# Patient Record
Sex: FEMALE | Race: WHITE | Employment: OTHER | URBAN - METROPOLITAN AREA
[De-identification: names, ages, dates, MRNs, and addresses within clinical notes are randomized per-mention and may not be internally consistent; named-entity substitution may affect disease eponyms.]

---

## 2017-03-06 ENCOUNTER — TELEPHONE (OUTPATIENT)
Dept: FAMILY MEDICINE CLINIC | Age: 62
End: 2017-03-06

## 2017-03-06 DIAGNOSIS — Z00.00 ROUTINE GENERAL MEDICAL EXAMINATION AT A HEALTH CARE FACILITY: Primary | ICD-10-CM

## 2017-03-06 DIAGNOSIS — Z13.9 SCREENING: ICD-10-CM

## 2017-03-14 ENCOUNTER — HOSPITAL ENCOUNTER (OUTPATIENT)
Dept: MAMMOGRAPHY | Age: 62
Discharge: HOME | End: 2017-03-14
Attending: FAMILY MEDICINE | Admitting: FAMILY MEDICINE

## 2017-03-14 DIAGNOSIS — Z12.31 ENCOUNTER FOR SCREENING MAMMOGRAM FOR BREAST CANCER: ICD-10-CM

## 2017-03-21 DIAGNOSIS — E78.00 HYPERCHOLESTEROLEMIA: ICD-10-CM

## 2017-03-21 DIAGNOSIS — Z00.00 PREVENTATIVE HEALTH CARE: ICD-10-CM

## 2017-03-21 DIAGNOSIS — Z13.9 SCREENING: ICD-10-CM

## 2017-03-21 LAB
ALT SERPL-CCNC: 16 U/L (ref 10–40)
ANION GAP SERPL CALCULATED.3IONS-SCNC: 14 MMOL/L (ref 3–16)
AST SERPL-CCNC: 23 U/L (ref 15–37)
BUN BLDV-MCNC: 7 MG/DL (ref 7–20)
CALCIUM SERPL-MCNC: 9.8 MG/DL (ref 8.3–10.6)
CHLORIDE BLD-SCNC: 100 MMOL/L (ref 99–110)
CHOLESTEROL, TOTAL: 142 MG/DL (ref 0–199)
CO2: 29 MMOL/L (ref 21–32)
CREAT SERPL-MCNC: 0.5 MG/DL (ref 0.6–1.2)
GFR AFRICAN AMERICAN: >60
GFR NON-AFRICAN AMERICAN: >60
GLUCOSE BLD-MCNC: 95 MG/DL (ref 70–99)
HDLC SERPL-MCNC: 72 MG/DL (ref 40–60)
HEPATITIS C ANTIBODY INTERPRETATION: NORMAL
LDL CHOLESTEROL CALCULATED: 50 MG/DL
POTASSIUM SERPL-SCNC: 3.6 MMOL/L (ref 3.5–5.1)
SODIUM BLD-SCNC: 143 MMOL/L (ref 136–145)
TRIGL SERPL-MCNC: 102 MG/DL (ref 0–150)
VLDLC SERPL CALC-MCNC: 20 MG/DL

## 2017-03-22 LAB — HIV-1 AND HIV-2 ANTIBODIES: NORMAL

## 2017-03-24 ENCOUNTER — OFFICE VISIT (OUTPATIENT)
Dept: FAMILY MEDICINE CLINIC | Age: 62
End: 2017-03-24

## 2017-03-24 ENCOUNTER — HOSPITAL ENCOUNTER (OUTPATIENT)
Dept: NON INVASIVE DIAGNOSTICS | Age: 62
Discharge: HOME | End: 2017-03-24
Attending: FAMILY MEDICINE | Admitting: FAMILY MEDICINE

## 2017-03-24 VITALS
WEIGHT: 123 LBS | OXYGEN SATURATION: 99 % | BODY MASS INDEX: 22.63 KG/M2 | HEART RATE: 60 BPM | DIASTOLIC BLOOD PRESSURE: 82 MMHG | HEIGHT: 62 IN | SYSTOLIC BLOOD PRESSURE: 120 MMHG | TEMPERATURE: 98.2 F

## 2017-03-24 DIAGNOSIS — F41.9 ANXIETY: ICD-10-CM

## 2017-03-24 DIAGNOSIS — M54.2 NECK PAIN: ICD-10-CM

## 2017-03-24 DIAGNOSIS — K21.9 GASTROESOPHAGEAL REFLUX DISEASE, ESOPHAGITIS PRESENCE NOT SPECIFIED: ICD-10-CM

## 2017-03-24 DIAGNOSIS — R20.2 PARESTHESIAS IN LEFT HAND: ICD-10-CM

## 2017-03-24 DIAGNOSIS — Z00.00 PREVENTATIVE HEALTH CARE: Primary | ICD-10-CM

## 2017-03-24 DIAGNOSIS — E78.00 HYPERCHOLESTEROLEMIA: ICD-10-CM

## 2017-03-24 PROCEDURE — 99396 PREV VISIT EST AGE 40-64: CPT | Performed by: FAMILY MEDICINE

## 2017-03-24 RX ORDER — OMEPRAZOLE 20 MG/1
20 CAPSULE, DELAYED RELEASE ORAL DAILY
Qty: 90 CAPSULE | Refills: 3 | Status: SHIPPED | OUTPATIENT
Start: 2017-03-24 | End: 2018-04-05 | Stop reason: SDUPTHER

## 2017-03-24 RX ORDER — CITALOPRAM 40 MG/1
TABLET ORAL
Qty: 135 TABLET | Refills: 3 | Status: SHIPPED | OUTPATIENT
Start: 2017-03-24 | End: 2018-04-05 | Stop reason: SDUPTHER

## 2017-03-24 RX ORDER — ATORVASTATIN CALCIUM 40 MG/1
TABLET, FILM COATED ORAL
Qty: 90 TABLET | Refills: 3 | Status: SHIPPED | OUTPATIENT
Start: 2017-03-24 | End: 2018-04-05 | Stop reason: SDUPTHER

## 2017-03-24 ASSESSMENT — ENCOUNTER SYMPTOMS
CONSTIPATION: 0
ABDOMINAL PAIN: 0
SHORTNESS OF BREATH: 0
TROUBLE SWALLOWING: 0
VOMITING: 0
DIARRHEA: 0
EYE PAIN: 0
NAUSEA: 0

## 2017-03-28 ENCOUNTER — HOSPITAL ENCOUNTER (OUTPATIENT)
Dept: NEUROLOGY | Age: 62
Discharge: HOME | End: 2017-03-28
Attending: FAMILY MEDICINE | Admitting: FAMILY MEDICINE

## 2017-03-28 DIAGNOSIS — R20.2 PARESTHESIA OF SKIN: ICD-10-CM

## 2017-04-19 ENCOUNTER — TELEPHONE (OUTPATIENT)
Dept: FAMILY MEDICINE CLINIC | Age: 62
End: 2017-04-19

## 2017-07-10 ENCOUNTER — OFFICE VISIT (OUTPATIENT)
Dept: FAMILY MEDICINE CLINIC | Age: 62
End: 2017-07-10

## 2017-07-10 VITALS
HEIGHT: 62 IN | BODY MASS INDEX: 22.63 KG/M2 | WEIGHT: 123 LBS | SYSTOLIC BLOOD PRESSURE: 130 MMHG | DIASTOLIC BLOOD PRESSURE: 68 MMHG | TEMPERATURE: 98 F

## 2017-07-10 DIAGNOSIS — M25.511 ACUTE PAIN OF RIGHT SHOULDER: Primary | ICD-10-CM

## 2017-07-10 PROCEDURE — 99213 OFFICE O/P EST LOW 20 MIN: CPT | Performed by: FAMILY MEDICINE

## 2017-07-11 ENCOUNTER — OFFICE VISIT (OUTPATIENT)
Dept: ORTHOPEDIC SURGERY | Age: 62
End: 2017-07-11

## 2017-07-11 ENCOUNTER — TELEPHONE (OUTPATIENT)
Dept: ORTHOPEDIC SURGERY | Age: 62
End: 2017-07-11

## 2017-07-11 VITALS
WEIGHT: 123.02 LBS | RESPIRATION RATE: 16 BRPM | HEIGHT: 62 IN | BODY MASS INDEX: 22.64 KG/M2 | SYSTOLIC BLOOD PRESSURE: 118 MMHG | DIASTOLIC BLOOD PRESSURE: 60 MMHG

## 2017-07-11 DIAGNOSIS — M25.511 RIGHT SHOULDER PAIN, UNSPECIFIED CHRONICITY: Primary | ICD-10-CM

## 2017-07-11 PROCEDURE — 99243 OFF/OP CNSLTJ NEW/EST LOW 30: CPT | Performed by: ORTHOPAEDIC SURGERY

## 2017-07-21 ENCOUNTER — HOSPITAL ENCOUNTER (OUTPATIENT)
Dept: MRI IMAGING | Age: 62
Discharge: HOME | End: 2017-07-21
Attending: ORTHOPAEDIC SURGERY | Admitting: ORTHOPAEDIC SURGERY

## 2017-07-21 DIAGNOSIS — M25.511 RIGHT SHOULDER PAIN, UNSPECIFIED CHRONICITY: ICD-10-CM

## 2017-07-21 DIAGNOSIS — M25.511 PAIN IN RIGHT SHOULDER: ICD-10-CM

## 2017-07-25 ENCOUNTER — OFFICE VISIT (OUTPATIENT)
Dept: ORTHOPEDIC SURGERY | Age: 62
End: 2017-07-25

## 2017-07-25 VITALS
DIASTOLIC BLOOD PRESSURE: 80 MMHG | WEIGHT: 123 LBS | HEIGHT: 62 IN | SYSTOLIC BLOOD PRESSURE: 134 MMHG | RESPIRATION RATE: 16 BRPM | BODY MASS INDEX: 22.63 KG/M2

## 2017-07-25 DIAGNOSIS — M75.121 COMPLETE TEAR OF RIGHT ROTATOR CUFF: Primary | ICD-10-CM

## 2017-07-25 PROCEDURE — 99214 OFFICE O/P EST MOD 30 MIN: CPT | Performed by: ORTHOPAEDIC SURGERY

## 2017-07-27 ENCOUNTER — OFFICE VISIT (OUTPATIENT)
Dept: FAMILY MEDICINE CLINIC | Age: 62
End: 2017-07-27

## 2017-07-27 VITALS
WEIGHT: 124.2 LBS | DIASTOLIC BLOOD PRESSURE: 78 MMHG | HEIGHT: 62 IN | HEART RATE: 64 BPM | BODY MASS INDEX: 22.86 KG/M2 | TEMPERATURE: 98.4 F | SYSTOLIC BLOOD PRESSURE: 128 MMHG

## 2017-07-27 DIAGNOSIS — K21.9 GASTROESOPHAGEAL REFLUX DISEASE, ESOPHAGITIS PRESENCE NOT SPECIFIED: ICD-10-CM

## 2017-07-27 DIAGNOSIS — M75.121 COMPLETE TEAR OF RIGHT ROTATOR CUFF: ICD-10-CM

## 2017-07-27 DIAGNOSIS — Z01.818 PREOP EXAMINATION: Primary | ICD-10-CM

## 2017-07-27 DIAGNOSIS — E78.00 HYPERCHOLESTEROLEMIA: ICD-10-CM

## 2017-07-27 PROCEDURE — 99242 OFF/OP CONSLTJ NEW/EST SF 20: CPT | Performed by: FAMILY MEDICINE

## 2017-07-27 ASSESSMENT — ENCOUNTER SYMPTOMS
EYE DISCHARGE: 0
COUGH: 0
CONSTIPATION: 0
VOMITING: 0
RHINORRHEA: 0
DIARRHEA: 0
SHORTNESS OF BREATH: 0
SINUS PRESSURE: 0
WHEEZING: 0
BACK PAIN: 0
CHEST TIGHTNESS: 0
EYE PAIN: 0
BLOOD IN STOOL: 0
COLOR CHANGE: 0
EYE REDNESS: 0
ABDOMINAL PAIN: 0

## 2017-08-03 ENCOUNTER — SURG/PROC ORDERS (OUTPATIENT)
Dept: ANESTHESIOLOGY | Age: 62
End: 2017-08-03

## 2017-08-03 RX ORDER — SODIUM CHLORIDE 0.9 % (FLUSH) 0.9 %
10 SYRINGE (ML) INJECTION EVERY 12 HOURS SCHEDULED
Status: CANCELLED | OUTPATIENT
Start: 2017-08-03

## 2017-08-03 RX ORDER — SODIUM CHLORIDE 9 MG/ML
INJECTION, SOLUTION INTRAVENOUS CONTINUOUS
Status: CANCELLED | OUTPATIENT
Start: 2017-08-03

## 2017-08-03 RX ORDER — SODIUM CHLORIDE 0.9 % (FLUSH) 0.9 %
10 SYRINGE (ML) INJECTION PRN
Status: CANCELLED | OUTPATIENT
Start: 2017-08-03

## 2017-08-04 ENCOUNTER — HOSPITAL ENCOUNTER (OUTPATIENT)
Dept: SURGERY | Age: 62
Discharge: HOME | End: 2017-08-04
Attending: ORTHOPAEDIC SURGERY | Admitting: ORTHOPAEDIC SURGERY

## 2017-08-04 VITALS
TEMPERATURE: 97.1 F | RESPIRATION RATE: 18 BRPM | HEART RATE: 64 BPM | SYSTOLIC BLOOD PRESSURE: 143 MMHG | OXYGEN SATURATION: 96 % | DIASTOLIC BLOOD PRESSURE: 76 MMHG

## 2017-08-04 LAB
ANION GAP SERPL CALCULATED.3IONS-SCNC: 13 MMOL/L (ref 3–16)
BUN BLDV-MCNC: 5 MG/DL (ref 7–20)
CALCIUM SERPL-MCNC: 9.1 MG/DL (ref 8.3–10.6)
CHLORIDE BLD-SCNC: 101 MMOL/L (ref 99–110)
CO2: 25 MMOL/L (ref 21–32)
CREAT SERPL-MCNC: <0.5 MG/DL (ref 0.6–1.2)
GFR AFRICAN AMERICAN: >60
GFR NON-AFRICAN AMERICAN: >60
GLUCOSE BLD-MCNC: 101 MG/DL (ref 70–99)
HCT VFR BLD CALC: 41.2 % (ref 36–48)
HEMOGLOBIN: 13.9 G/DL (ref 12–16)
MCH RBC QN AUTO: 33.5 PG (ref 26–34)
MCHC RBC AUTO-ENTMCNC: 33.8 G/DL (ref 31–36)
MCV RBC AUTO: 99.1 FL (ref 80–100)
PDW BLD-RTO: 13.1 % (ref 12.4–15.4)
PLATELET # BLD: 239 K/UL (ref 135–450)
PMV BLD AUTO: 8.8 FL (ref 5–10.5)
POTASSIUM SERPL-SCNC: 3.4 MMOL/L (ref 3.5–5.1)
RBC # BLD: 4.16 M/UL (ref 4–5.2)
SODIUM BLD-SCNC: 139 MMOL/L (ref 136–145)
WBC # BLD: 7.1 K/UL (ref 4–11)

## 2017-08-04 PROCEDURE — 29827 SHO ARTHRS SRG RT8TR CUF RPR: CPT | Performed by: ORTHOPAEDIC SURGERY

## 2017-08-04 PROCEDURE — 93010 ELECTROCARDIOGRAM REPORT: CPT | Performed by: INTERNAL MEDICINE

## 2017-08-04 PROCEDURE — 29826 SHO ARTHRS SRG DECOMPRESSION: CPT | Performed by: ORTHOPAEDIC SURGERY

## 2017-08-04 RX ORDER — MORPHINE SULFATE 2 MG/ML
2 INJECTION, SOLUTION INTRAMUSCULAR; INTRAVENOUS EVERY 5 MIN PRN
Status: DISCONTINUED | OUTPATIENT
Start: 2017-08-04 | End: 2017-08-05 | Stop reason: HOSPADM

## 2017-08-04 RX ORDER — OXYCODONE HYDROCHLORIDE 5 MG/1
10 TABLET ORAL PRN
Status: ACTIVE | OUTPATIENT
Start: 2017-08-04 | End: 2017-08-04

## 2017-08-04 RX ORDER — OXYCODONE HYDROCHLORIDE AND ACETAMINOPHEN 5; 325 MG/1; MG/1
TABLET ORAL
Qty: 80 TABLET | Refills: 0 | Status: SHIPPED | OUTPATIENT
Start: 2017-08-04 | End: 2017-08-22 | Stop reason: SDUPTHER

## 2017-08-04 RX ORDER — OXYCODONE HYDROCHLORIDE AND ACETAMINOPHEN 5; 325 MG/1; MG/1
TABLET ORAL
Qty: 80 TABLET | Refills: 0 | Status: SHIPPED | OUTPATIENT
Start: 2017-08-04 | End: 2017-08-04

## 2017-08-04 RX ORDER — OXYCODONE HYDROCHLORIDE 5 MG/1
5 TABLET ORAL PRN
Status: ACTIVE | OUTPATIENT
Start: 2017-08-04 | End: 2017-08-04

## 2017-08-04 RX ORDER — FENTANYL CITRATE 50 UG/ML
50 INJECTION, SOLUTION INTRAMUSCULAR; INTRAVENOUS EVERY 5 MIN PRN
Status: DISCONTINUED | OUTPATIENT
Start: 2017-08-04 | End: 2017-08-05 | Stop reason: HOSPADM

## 2017-08-04 RX ORDER — SODIUM CHLORIDE 0.9 % (FLUSH) 0.9 %
10 SYRINGE (ML) INJECTION EVERY 12 HOURS SCHEDULED
Status: DISCONTINUED | OUTPATIENT
Start: 2017-08-04 | End: 2017-08-05 | Stop reason: HOSPADM

## 2017-08-04 RX ORDER — SODIUM CHLORIDE 9 MG/ML
INJECTION, SOLUTION INTRAVENOUS CONTINUOUS
Status: DISCONTINUED | OUTPATIENT
Start: 2017-08-04 | End: 2017-08-05 | Stop reason: HOSPADM

## 2017-08-04 RX ORDER — MEPERIDINE HYDROCHLORIDE 25 MG/ML
12.5 INJECTION INTRAMUSCULAR; INTRAVENOUS; SUBCUTANEOUS EVERY 5 MIN PRN
Status: DISCONTINUED | OUTPATIENT
Start: 2017-08-04 | End: 2017-08-05 | Stop reason: HOSPADM

## 2017-08-04 RX ORDER — FENTANYL CITRATE 50 UG/ML
25 INJECTION, SOLUTION INTRAMUSCULAR; INTRAVENOUS EVERY 5 MIN PRN
Status: DISCONTINUED | OUTPATIENT
Start: 2017-08-04 | End: 2017-08-05 | Stop reason: HOSPADM

## 2017-08-04 RX ORDER — MORPHINE SULFATE 2 MG/ML
1 INJECTION, SOLUTION INTRAMUSCULAR; INTRAVENOUS EVERY 5 MIN PRN
Status: DISCONTINUED | OUTPATIENT
Start: 2017-08-04 | End: 2017-08-05 | Stop reason: HOSPADM

## 2017-08-04 RX ORDER — ONDANSETRON 2 MG/ML
4 INJECTION INTRAMUSCULAR; INTRAVENOUS
Status: ACTIVE | OUTPATIENT
Start: 2017-08-04 | End: 2017-08-04

## 2017-08-04 RX ORDER — PROMETHAZINE HYDROCHLORIDE 25 MG/1
25 TABLET ORAL EVERY 6 HOURS PRN
Qty: 5 TABLET | Refills: 0 | Status: SHIPPED | OUTPATIENT
Start: 2017-08-04 | End: 2017-08-22

## 2017-08-04 RX ORDER — SODIUM CHLORIDE 0.9 % (FLUSH) 0.9 %
10 SYRINGE (ML) INJECTION PRN
Status: DISCONTINUED | OUTPATIENT
Start: 2017-08-04 | End: 2017-08-05 | Stop reason: HOSPADM

## 2017-08-04 RX ORDER — PROMETHAZINE HYDROCHLORIDE 25 MG/1
25 TABLET ORAL EVERY 6 HOURS PRN
Qty: 5 TABLET | Refills: 0 | Status: SHIPPED | OUTPATIENT
Start: 2017-08-04 | End: 2017-08-04

## 2017-08-04 RX ADMIN — SODIUM CHLORIDE: 9 INJECTION, SOLUTION INTRAVENOUS at 09:10

## 2017-08-04 ASSESSMENT — PAIN SCALES - GENERAL
PAINLEVEL_OUTOF10: 0

## 2017-08-04 ASSESSMENT — PAIN - FUNCTIONAL ASSESSMENT: PAIN_FUNCTIONAL_ASSESSMENT: 0-10

## 2017-08-07 LAB
EKG ATRIAL RATE: 53 BPM
EKG DIAGNOSIS: NORMAL
EKG P AXIS: 73 DEGREES
EKG P-R INTERVAL: 134 MS
EKG Q-T INTERVAL: 486 MS
EKG QRS DURATION: 86 MS
EKG QTC CALCULATION (BAZETT): 456 MS
EKG R AXIS: 67 DEGREES
EKG T AXIS: 63 DEGREES
EKG VENTRICULAR RATE: 53 BPM

## 2017-08-08 ENCOUNTER — OFFICE VISIT (OUTPATIENT)
Dept: ORTHOPEDIC SURGERY | Age: 62
End: 2017-08-08

## 2017-08-08 VITALS — BODY MASS INDEX: 22.63 KG/M2 | RESPIRATION RATE: 16 BRPM | HEIGHT: 62 IN | WEIGHT: 123 LBS

## 2017-08-08 DIAGNOSIS — Z98.890 S/P COMPLETE REPAIR OF ROTATOR CUFF: Primary | ICD-10-CM

## 2017-08-08 PROCEDURE — 99024 POSTOP FOLLOW-UP VISIT: CPT | Performed by: ORTHOPAEDIC SURGERY

## 2017-08-22 ENCOUNTER — OFFICE VISIT (OUTPATIENT)
Dept: ORTHOPEDIC SURGERY | Age: 62
End: 2017-08-22

## 2017-08-22 VITALS — WEIGHT: 123 LBS | HEIGHT: 62 IN | RESPIRATION RATE: 16 BRPM | BODY MASS INDEX: 22.63 KG/M2

## 2017-08-22 DIAGNOSIS — M75.121 COMPLETE TEAR OF RIGHT ROTATOR CUFF: Primary | ICD-10-CM

## 2017-08-22 PROCEDURE — 99024 POSTOP FOLLOW-UP VISIT: CPT | Performed by: ORTHOPAEDIC SURGERY

## 2017-08-22 RX ORDER — OXYCODONE HYDROCHLORIDE AND ACETAMINOPHEN 5; 325 MG/1; MG/1
TABLET ORAL
Qty: 80 TABLET | Refills: 0 | Status: SHIPPED | OUTPATIENT
Start: 2017-08-22 | End: 2017-09-19 | Stop reason: SDUPTHER

## 2017-09-19 ENCOUNTER — OFFICE VISIT (OUTPATIENT)
Dept: ORTHOPEDIC SURGERY | Age: 62
End: 2017-09-19

## 2017-09-19 VITALS — RESPIRATION RATE: 16 BRPM | BODY MASS INDEX: 22.64 KG/M2 | HEIGHT: 62 IN | WEIGHT: 123.02 LBS

## 2017-09-19 DIAGNOSIS — M75.121 COMPLETE TEAR OF RIGHT ROTATOR CUFF: Primary | ICD-10-CM

## 2017-09-19 PROCEDURE — 99024 POSTOP FOLLOW-UP VISIT: CPT | Performed by: ORTHOPAEDIC SURGERY

## 2017-09-19 RX ORDER — OXYCODONE HYDROCHLORIDE AND ACETAMINOPHEN 5; 325 MG/1; MG/1
TABLET ORAL
Qty: 60 TABLET | Refills: 0 | Status: SHIPPED | OUTPATIENT
Start: 2017-09-19 | End: 2017-11-14

## 2017-09-23 ENCOUNTER — HOSPITAL ENCOUNTER (OUTPATIENT)
Dept: PHYSICAL THERAPY | Age: 62
Discharge: HOME | End: 2017-09-30
Admitting: ORTHOPAEDIC SURGERY

## 2017-09-23 NOTE — PLAN OF CARE
[facility], [address]  Phone: [phone]  Fax [phone]     Physical Therapy Certification    Dear Referring Practitioner: Dr. Yola Galdamez,    We had the pleasure of evaluating the following patient for physical therapy services at [address] [place] [address]. A summary of our findings can be found in the initial assessment below. This includes our plan of care. If you have any questions or concerns regarding these findings, please do not hesitate to contact me at the office phone number checked above. Thank you for the referral.       Physician Signature:_______________________________Date:__________________  By signing above (or electronic signature), therapists plan is approved by physician    Patient: Malina Araiza   : 1955   MRN: [id number]  Referring Physician: Referring Practitioner: Dr. Yola Galdamez      Evaluation Date: 2017      Medical Diagnosis Information:  Diagnosis: R Rotator Cuff Tear - M75.121   Treatment Diagnosis: R Rotator cuff tear - M75.121 / R shoulder stiffness M25.611/ R shoulder pain M25.511                                         Insurance information: PT Insurance Information: Cornelio x - 20 visits     Precautions/ Contra-indications: Pt reports L shoulder pain as well. Latex Allergy:  [x]NO      []YES  Preferred Language for Healthcare:   [x]English       []other:    SUBJECTIVE: Patient stated complaint: Pt had discomfort in her R shoulder for months. Then on vacation in July she reached out to catch herself on a float and had a bad pain. She went to the MD on the way home because she could not lift her arm. She had surgery on 17 for SAD, RCR.     Relevant Medical History:Anxiety / Depression   Functional Disability Index:PT G-Codes  Functional Assessment Tool Used: QuickDASH  Score: 60%  Functional Limitation: Carrying, moving and handling objects  Carrying, Moving and Handling Objects Current Status (): At least 60 percent but less than 80 percent impaired, limited or restricted  Carrying, Moving and Handling Objects Goal Status (): At least 20 percent but less than 40 percent impaired, limited or restricted    Pain Scale: 2/10  Easing factors: ice machine   Provocative factors: getting dressed, doing hair, She has not used her arm in any sort of lifting motion     Type: []Constant   [x]Intermittent  []Radiating []Localized []other:     Numbness/Tingling: R thumb    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 She has gone back to work, but has been picking light jobs. Living Status/Prior Level of Function: Independent with ADLs and IADLs,    OBJECTIVE:     CERV ROM Limited     Cervical Flexion     Cervical Extension     Cervical SB     Cervical rotation          ROM Left Right   Shoulder Flex 162 38   Shoulder Abd 164 35   Shoulder ER T2 chin   Shoulder IR T6 R glut fold   Elbow flex     Elbow ext           Strength  Left Right   Shoulder Flex 4+/5 NA due to surgery   Shoulder Scap 4-/5    Shoulder ER 4+/5    Shoulder IR 5/5                Reflexes/Sensation:    [x]Dermatomes/Myotomes intact    [x]Reflexes equal and normal bilaterally   []Other:    Joint mobility:    []Normal    [x]Hypo   []Hyper    Palpation: tenderness to palpation of cervical muscles and around the shoulder    Functional Mobility/Transfers: independent        Posture: rounded shoulders/ forward head     Bandages/Dressings/Incisions: healed    Gait: : WNL    Orthopedic Special Tests: NA due to post surgerical status                        [x] Patient history, allergies, meds reviewed. Medical chart reviewed. See intake form. Review Of Systems (ROS):  [x]Performed Review of systems (Integumentary, CardioPulmonary, Neurological) by intake and observation. Intake form has been scanned into medical record.  Patient has been instructed to contact their primary care physician regarding ROS issues if not already being addressed at this time. Co-morbidities/Complexities (which will affect course of rehabilitation):   []None           Arthritic conditions   []Rheumatoid arthritis (M05.9)  []Osteoarthritis (M19.91)   Cardiovascular conditions   []Hypertension (I10)  []Hyperlipidemia (E78.5)  []Angina pectoris (I20)  []Atherosclerosis (I70)   Musculoskeletal conditions   []Disc pathology   []Congenital spine pathologies   []Prior surgical intervention  []Osteoporosis (M81.8)  []Osteopenia (M85.8)   Endocrine conditions   []Hypothyroid (E03.9)  []Hyperthyroid Gastrointestinal conditions   []Constipation (I14.78)   Metabolic conditions   []Morbid obesity (E66.01)  []Diabetes type 1(E10.65) or 2 (E11.65)   []Neuropathy (G60.9)     Pulmonary conditions   []Asthma (J45)  []Coughing   []COPD (J44.9)   Psychological Disorders  [x]Anxiety (F41.9)  [x]Depression (F32.9)   []Other:   []Other:          Barriers to/and or personal factors that will affect rehab potential:              [x]Age  []Sex              []Motivation/Lack of Motivation                        [x]Co-Morbidities              []Cognitive Function, education/learning barriers              []Environmental, home barriers              []profession/work barriers  []past PT/medical experience  []other:  Justification:  Pt does have some weakness in abduction on the L shoulder. She also is returning to a physical job     Falls Risk Assessment (30 days):   [x] Falls Risk assessed and no intervention required. [] Falls Risk assessed and Patient requires intervention due to being higher risk   TUG score (>12s at risk):     [] Falls education provided, including       G-Codes:  PT G-Codes  Functional Assessment Tool Used: QuickDASH  Score: 60%  Functional Limitation: Carrying, moving and handling objects  Carrying, Moving and Handling Objects Current Status ():  At least 60 percent but less than 80 percent impaired, limited or dysfunction    []Signs/symptoms consistent with Glenohumeral IR Deficit - <45 degrees   []Signs/symptoms consistent with facet dysfunction of cervical/thoracic spine    []Signs/symptoms consistent with pathology which may benefit from Dry needling     []other:     Prognosis/Rehab Potential:      []Excellent   [x]Good    []Fair   []Poor    Tolerance of evaluation/treatment:    []Excellent   [x]Good    []Fair   []Poor  Physical Therapy Evaluation Complexity Justification  [x] A history of present problem with:  [] no personal factors and/or comorbidities that impact the plan of care;  [x]1-2 personal factors and/or comorbidities that impact the plan of care  []3 personal factors and/or comorbidities that impact the plan of care  [x] An examination of body systems using standardized tests and measures addressing any of the following: body structures and functions (impairments), activity limitations, and/or participation restrictions;:  [x] a total of 1-2 or more elements   [] a total of 3 or more elements   [] a total of 4 or more elements   [x] A clinical presentation with:  [] stable and/or uncomplicated characteristics   [x] evolving clinical presentation with changing characteristics  [] unstable and unpredictable characteristics;   [x] Clinical decision making of [x] low, [] moderate, [] high complexity using standardized patient assessment instrument and/or measurable assessment of functional outcome.     [x] EVAL (LOW) 91857 (typically 20 minutes face-to-face)  [] EVAL (MOD) 83208 (typically 30 minutes face-to-face)  [] EVAL (HIGH) 58570 (typically 45 minutes face-to-face)  [] RE-EVAL       PLAN:  Frequency/Duration:  2 days per week for 8 Weeks:  INTERVENTIONS:  [x] Therapeutic exercise including: strength training, ROM, for Upper extremity and core   [x]  NMR activation and proprioception for UE, scap and Core   [x] Manual therapy as indicated for shoulder, scapula and spine to include: Dry Needling/IASTM, STM, PROM, Gr I-IV mobilizations, manipulation. [x] Modalities as needed that may include: thermal agents, E-stim, Biofeedback, US, iontophoresis as indicated  [x] Patient education on joint protection, postural re-education, activity modification, progression of HEP. HEP instruction:(see scanned forms)    GOALS:  Patient stated goal: To be able to brush her hair and put on make up    Therapist goals for Patient:   Short Term Goals: To be achieved in: 2 weeks  1. Independent in HEP and progression per patient tolerance, in order to prevent re-injury. 2. Patient will have a decrease in pain to facilitate improvement in movement, function, and ADLs as indicated by Functional Deficits. Long Term Goals: To be achieved in: 8 weeks  1. Disability index score of 30% or less for the [facility] to assist with reaching prior level of function. 2. Patient will demonstrate increased AROM to shoulder flex and abd 130, IR: T12, ER: T1 to allow for proper joint functioning as indicated by patients Functional Deficits. 3. Patient will demonstrate an increase in Strength to 4/5 in the right arm to allow for proper functional mobility as indicated by patients Functional Deficits. 4. Patient will return to self care activities without increased symptoms or restriction.    5. 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

## 2017-09-23 NOTE — FLOWSHEET NOTE
Manual Intervention                                        NMR re-education     Pt was educated on weaning from the sling                                             Therapeutic Exercise and NMR EXR  [] (26227) Provided verbal/tactile cueing for activities related to strengthening, flexibility, endurance, ROM  for improvements in scapular, scapulothoracic and UE control with self care, reaching, carrying, lifting, house/yardwork, driving/computer work.    [] (56453) Provided verbal/tactile cueing for activities related to improving balance, coordination, kinesthetic sense, posture, motor skill, proprioception  to assist with  scapular, scapulothoracic and UE control with self care, reaching, carrying, lifting, house/yardwork, driving/computer work. Therapeutic Activities:    [] (31987 or 43268) Provided verbal/tactile cueing for activities related to improving balance, coordination, kinesthetic sense, posture, motor skill, proprioception and motor activation to allow for proper function of scapular, scapulothoracic and UE control with self care, carrying, lifting, driving/computer work.      Home Exercise Program:    [x] (43848) Reviewed/Progressed HEP activities related to strengthening, flexibility, endurance, ROM of scapular, scapulothoracic and UE control with self care, reaching, carrying, lifting, house/yardwork, driving/computer work  [] (67053) Reviewed/Progressed HEP activities related to improving balance, coordination, kinesthetic sense, posture, motor skill, proprioception of scapular, scapulothoracic and UE control with self care, reaching, carrying, lifting, house/yardwork, driving/computer work      Manual Treatments:  PROM / STM / Oscillations-Mobs:  G-I, II, III, IV (PA's, Inf., Post.)  [] (04969) Provided manual therapy to mobilize soft tissue/joints of cervical/CT, scapular GHJ and UE for the purpose of modulating pain, promoting relaxation,  increasing ROM, reducing/eliminating soft tissue swelling/inflammation/restriction, improving soft tissue extensibility and allowing for proper ROM for normal function with self care, reaching, carrying, lifting, house/yardwork, driving/computer work    Modalities:  premod + cp - 15 min     Charges:  Timed Code Treatment Minutes: 35   Total Treatment Minutes: 65     [x] EVAL (LOW) 96917 (typically 20 minutes face-to-face)  [] EVAL (MOD) 95608 (typically 30 minutes face-to-face)  [] EVAL (HIGH) 28683 (typically 45 minutes face-to-face)  [] RE-EVAL     [x] UP(96683) x  1   [] IONTO  [] NMR (97133) x      [] VASO  [x] Manual (28787) x  1    [] Other:  [] TA x       [] Mech Traction (85573)  [] ES(attended) (73371)      [x] ES (un) (61384):     GOALS  Patient stated goal: To be able to brush her hair and put on make up     Therapist goals for Patient:   Short Term Goals: To be achieved in: 2 weeks  1. Independent in HEP and progression per patient tolerance, in order to prevent re-injury. 2. Patient will have a decrease in pain to facilitate improvement in movement, function, and ADLs as indicated by Functional Deficits.     Long Term Goals: To be achieved in: 8 weeks  1. Disability index score of 30% or less for the [facility] to assist with reaching prior level of function. 2. Patient will demonstrate increased AROM to shoulder flex and abd 130, IR: T12, ER: T1 to allow for proper joint functioning as indicated by patients Functional Deficits. 3. Patient will demonstrate an increase in Strength to 4/5 in the right arm to allow for proper functional mobility as indicated by patients Functional Deficits. 4. Patient will return to self care activities without increased symptoms or restriction. 5. 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 [] Progression is slowed due to complexities listed.   [] Progression has been slowed due to co-morbidities.   [x] Plan just implemented, too soon to assess goals progression  [] Other:     ASSESSMENT:  See eval    Treatment/Activity Tolerance:  [x] Patient tolerated treatment well [] Patient limited by fatique  [] Patient limited by pain  [] Patient limited by other medical complications  [] Other:     Prognosis: [x] Good [] Fair  [] Poor    Patient Requires Follow-up: [x] Yes  [] No    PLAN: See eval  [] Continue per plan of care [] Alter current plan (see comments)  [x] Plan of care initiated [] Hold pending MD visit [] Discharge    Electronically signed by: Aba Asher PT, DPT #[id number]

## 2017-09-26 ENCOUNTER — HOSPITAL ENCOUNTER (OUTPATIENT)
Dept: PHYSICAL THERAPY | Age: 62
Discharge: HOME | End: 2017-09-26
Admitting: ORTHOPAEDIC SURGERY

## 2017-09-28 ENCOUNTER — HOSPITAL ENCOUNTER (OUTPATIENT)
Dept: PHYSICAL THERAPY | Age: 62
Discharge: HOME | End: 2017-09-28
Admitting: ORTHOPAEDIC SURGERY

## 2017-10-03 ENCOUNTER — OFFICE VISIT (OUTPATIENT)
Dept: ORTHOPEDIC SURGERY | Age: 62
End: 2017-10-03

## 2017-10-03 ENCOUNTER — HOSPITAL ENCOUNTER (OUTPATIENT)
Dept: PHYSICAL THERAPY | Age: 62
Discharge: HOME | End: 2017-10-03
Admitting: ORTHOPAEDIC SURGERY

## 2017-10-03 VITALS
HEART RATE: 58 BPM | DIASTOLIC BLOOD PRESSURE: 65 MMHG | BODY MASS INDEX: 22.26 KG/M2 | WEIGHT: 121 LBS | HEIGHT: 62 IN | SYSTOLIC BLOOD PRESSURE: 118 MMHG

## 2017-10-03 DIAGNOSIS — M75.82 ROTATOR CUFF TENDONITIS, LEFT: Primary | ICD-10-CM

## 2017-10-03 DIAGNOSIS — M25.512 LEFT SHOULDER PAIN, UNSPECIFIED CHRONICITY: ICD-10-CM

## 2017-10-03 PROCEDURE — 99214 OFFICE O/P EST MOD 30 MIN: CPT | Performed by: ORTHOPAEDIC SURGERY

## 2017-10-03 PROCEDURE — 20610 DRAIN/INJ JOINT/BURSA W/O US: CPT | Performed by: ORTHOPAEDIC SURGERY

## 2017-10-03 NOTE — MR AVS SNAPSHOT
Medications and Orders      Your Current Medications Are              oxyCODONE-acetaminophen (PERCOCET) 5-325 MG per tablet Take 1 tablet every 8-12 hours as needed for pain. Calcium Carbonate-Vitamin D (CALTRATE 600+D PO) Take 1 tablet by mouth 2 times daily    atorvastatin (LIPITOR) 40 MG tablet TAKE ONE TABLET BY MOUTH DAILY    citalopram (CELEXA) 40 MG tablet TAKE ONE AND ONE-HALF (1  1/2) TABLETS BY MOUTH DAILY    omeprazole (PRILOSEC) 20 MG delayed release capsule Take 1 capsule by mouth daily    Biotin 1 MG CAPS Take  by mouth daily. fish oil-omega-3 fatty acids 1000 MG capsule Take 1 g by mouth 2 times daily     Multiple Vitamins-Minerals (CENTRUM SILVER PO) Take  by mouth daily. B Complex Vitamins (B COMPLEX 100 PO) Take  by mouth daily. aspirin 81 MG EC tablet Take 81 mg by mouth daily.         Allergies           No Known Allergies      We Ordered/Performed the following           VT ARTHROCENTESIS ASPIR&/INJ MAJOR JT/BURSA W/O US     VT TRIAMCINOLONE ACETONIDE INJ     Comments:    Kenalog (40mg/mL) 1 cc injected    XR SHOULDER LEFT (MIN 2 VIEWS)     Comments:    RM 26. 3V Lt Shoulder         Result Summary for XR SHOULDER LEFT (MIN 2 VIEWS)      Result Information     Status          Final result (Exam End: 10/3/2017  2:26 PM)           10/3/2017  2:26 PM      Narrative & Impression           Radiology result is complete; follow up with provider / physician office for radiology results                       Additional Information        Basic Information     Date Of Birth Sex Race Ethnicity Preferred Language    1955 Female White Non-/Non  English      Problem List as of 10/3/2017  Date Reviewed: 10/3/2017                Complete tear of right rotator cuff    Hypercholesterolemia    Dysphagia    Ulcer, esophagus    Depression    Anxiety    GERD (gastroesophageal reflux disease)      Immunizations as of 10/3/2017     Name Date

## 2017-10-03 NOTE — PROGRESS NOTES
CHIEF COMPLAINT: Left shoulder pain    History:    Sana Hernández is a 60 y.o. White female self-referred for evaluation and treatment of Left shoulder pain. She is 2 months s/p right shoulder rotator cuff repair. This is evaluated as a personal injury. The pain is described as aching. She states it feels like her right shoulder did prior to surgery. The pain began 3 months ago. There was not an injury. Pain is rated as a 5/10 . Pain is located lateral.  The symptoms are worse with reaching, lifting, work at or above shoulder height. Symptoms improve with ice. Limited activities include no limitations. No stiffness, no weakness reported. The patient does report night pain. The patient has had PT. She is dong the same exercises on her left shoulder as she is for her right shoulder rotator cuff rehab. The patient has not had an injection. The patient has not tried NSAIDs. Outside reports reviewed:  none. Past Medical History:   Diagnosis Date    Acid reflux     Anxiety     Depression     Hyperlipidemia     controlled with meds    Ulcer, esophagus        Current Outpatient Prescriptions on File Prior to Visit   Medication Sig Dispense Refill    oxyCODONE-acetaminophen (PERCOCET) 5-325 MG per tablet Take 1 tablet every 8-12 hours as needed for pain. 60 tablet 0    Calcium Carbonate-Vitamin D (CALTRATE 600+D PO) Take 1 tablet by mouth 2 times daily      atorvastatin (LIPITOR) 40 MG tablet TAKE ONE TABLET BY MOUTH DAILY (Patient taking differently: Take 40 mg by mouth nightly TAKE ONE TABLET BY MOUTH DAILY) 90 tablet 3    citalopram (CELEXA) 40 MG tablet TAKE ONE AND ONE-HALF (1  1/2) TABLETS BY MOUTH DAILY 135 tablet 3    omeprazole (PRILOSEC) 20 MG delayed release capsule Take 1 capsule by mouth daily 90 capsule 3    Biotin 1 MG CAPS Take  by mouth daily.       fish oil-omega-3 fatty acids 1000 MG capsule Take 1 g by mouth 2 times daily       Multiple Vitamins-Minerals (CENTRUM SILVER PO) Take

## 2017-10-03 NOTE — FLOWSHEET NOTE
educated for HEP  HEP only   Table slides ( counter walk back)  10 x 10\" sec  HEP    scap retract  10x10\" HEP   Cane ER supine/cane flexion supine 10x10\"    Supine bicep curl 1# 20x with scap retraction    Finisher flexion alt R/L foot fwd 2# 10x each    Finisher small\" wax on\" BUEs 2# 10x    SL ER With PT assist 10x    Seated no $ 10x5\" +HEP 10/3/17                            pulleys 15x 5\" flexion              Manual Intervention     GII oscillations and post/inf GH mobs, PROM ER and flexion 12'                                  NMR re-education     Seated submax isos 25% FLX/ER/IR/EXT 5x5\" each                                           Therapeutic Exercise and NMR EXR  [] (04294) Provided verbal/tactile cueing for activities related to strengthening, flexibility, endurance, ROM  for improvements in scapular, scapulothoracic and UE control with self care, reaching, carrying, lifting, house/yardwork, driving/computer work.    [] (18208) Provided verbal/tactile cueing for activities related to improving balance, coordination, kinesthetic sense, posture, motor skill, proprioception  to assist with  scapular, scapulothoracic and UE control with self care, reaching, carrying, lifting, house/yardwork, driving/computer work. Therapeutic Activities:    [] (29351 or 07524) Provided verbal/tactile cueing for activities related to improving balance, coordination, kinesthetic sense, posture, motor skill, proprioception and motor activation to allow for proper function of scapular, scapulothoracic and UE control with self care, carrying, lifting, driving/computer work.      Home Exercise Program:    [x] (88310) Reviewed/Progressed HEP activities related to strengthening, flexibility, endurance, ROM of scapular, scapulothoracic and UE control with self care, reaching, carrying, lifting, house/yardwork, driving/computer work  [] (71143) Reviewed/Progressed HEP activities related to improving balance, coordination, kinesthetic sense, posture, motor skill, proprioception of scapular, scapulothoracic and UE control with self care, reaching, carrying, lifting, house/yardwork, driving/computer work      Manual Treatments:  PROM / STM / Oscillations-Mobs:  G-I, II, III, IV (PA's, Inf., Post.)  [] (91959) Provided manual therapy to mobilize soft tissue/joints of cervical/CT, scapular GHJ and UE for the purpose of modulating pain, promoting relaxation,  increasing ROM, reducing/eliminating soft tissue swelling/inflammation/restriction, improving soft tissue extensibility and allowing for proper ROM for normal function with self care, reaching, carrying, lifting, house/yardwork, driving/computer work    Modalities:    PM/CP x 15' R shoulder    Charges:  Timed Code Treatment Minutes: 41   Total Treatment Minutes: 56     [] EVAL (LOW) 26517 (typically 20 minutes face-to-face)  [] EVAL (MOD) 75246 (typically 30 minutes face-to-face)  [] EVAL (HIGH) 00102 (typically 45 minutes face-to-face)  [] RE-EVAL     [x] WE(85391) x  2   [] IONTO  [] NMR (91159) x      [] VASO  [x] Manual (73014) x  1    [] Other:  [] TA x       [] Mech Traction (48323)  [] ES(attended) (60212)      [x] ES (un) (50573):     GOALS  Patient stated goal: To be able to brush her hair and put on make up     Therapist goals for Patient:   Short Term Goals: To be achieved in: 2 weeks  1. Independent in HEP and progression per patient tolerance, in order to prevent re-injury. 2. Patient will have a decrease in pain to facilitate improvement in movement, function, and ADLs as indicated by Functional Deficits.     Long Term Goals: To be achieved in: 8 weeks  1. Disability index score of 30% or less for the [facility] to assist with reaching prior level of function. 2. Patient will demonstrate increased AROM to shoulder flex and abd 130, IR: T12, ER: T1 to allow for proper joint functioning as indicated by patients Functional Deficits.    3. Patient will demonstrate an increase in Strength to 4/5 in the right arm to allow for proper functional mobility as indicated by patients Functional Deficits. 4. Patient will return to self care activities without increased symptoms or restriction. 5. 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 [] Progression is slowed due to complexities listed. [] Progression has been slowed due to co-morbidities. [x] Plan just implemented, too soon to assess goals progression  [] Other:     ASSESSMENT: Better tolerance to PROM both flexion and ER this visit. Becoming more comfortable with moving UE. Treatment/Activity Tolerance:  [x] Patient tolerated treatment well [] Patient limited by fatique  [] Patient limited by pain  [] Patient limited by other medical complications  [] Other:     Prognosis: [x] Good [] Fair  [] Poor    Patient Requires Follow-up: [x] Yes  [] No    PLAN: See eval for full POC; ROM measurements NV.   [x] Continue per plan of care [] Alter current plan (see comments)  [] Plan of care initiated [] Hold pending MD visit [] Discharge    Electronically signed by: Natalia Trevizo PT [id number]

## 2017-10-05 ENCOUNTER — HOSPITAL ENCOUNTER (OUTPATIENT)
Dept: PHYSICAL THERAPY | Age: 62
Discharge: HOME | End: 2017-10-05
Admitting: ORTHOPAEDIC SURGERY

## 2017-10-05 NOTE — FLOWSHEET NOTE
[facility],  [address]  Phone: [phone]  Fax [phone]      Physical Therapy Daily Treatment Note  Date:  10/5/2017    Patient Name:  Wilfrid Desai    :  1955  MRN: [id number]  Restrictions/Precautions:  Surgery on 17 RCR massive tear  Medical/Treatment Diagnosis Information:  · Diagnosis: R Rotator Cuff Tear - M75.121  · Treatment Diagnosis: R Rotator cuff tear - M75.121 / R shoulder stiffness M25.611/ R shoulder pain J77.486  Insurance/Certification information:  PT Insurance Information: [employer] - 20 visits   Physician Information:  Referring Practitioner: Dr. Michela Lima of care signed (Y/N): NO    Date of Patient follow up with Physician: 17       G-Code (if applicable):                                                                                     Date G-Code Applied:  17  PT G-Codes  Functional Assessment Tool Used: QuickDASH  Score: 60%  Functional Limitation: Carrying, moving and handling objects  Carrying, Moving and Handling Objects Current Status (): At least 60 percent but less than 80 percent impaired, limited or restricted  Carrying, Moving and Handling Objects Goal Status (): At least 20 percent but less than 40 percent impaired, limited or restricted    Progress Note: [x]  Yes  []  No  Next due by: Visit #10      Latex Allergy:  [x]NO      []YES  Preferred Language for Healthcare:   [x]English       []other:    Visit # Insurance Allowable   5 20     Pain level:  1-2/10     SUBJECTIVE: \"Has been really good not overly sore after last visit. Doctor said I'm doing well. \"    OBJECTIVE:   Observation: tight inf/post capsule  Test measurements: PROM ER @60 ABD:60 supine cane     RESTRICTIONS/PRECAUTIONS: L shoulder weakness in abduction/ limited cervical ROM     Exercises/Interventions:   Therapeutic Ex Sets/rep comments   HEP only   HEP only   Pt educated improving balance, coordination, kinesthetic sense, posture, motor skill, proprioception of scapular, scapulothoracic and UE control with self care, reaching, carrying, lifting, house/yardwork, driving/computer work      Manual Treatments:  PROM / STM / Oscillations-Mobs:  G-I, II, III, IV (PA's, Inf., Post.)  [x] (38031) Provided manual therapy to mobilize soft tissue/joints of cervical/CT, scapular GHJ and UE for the purpose of modulating pain, promoting relaxation,  increasing ROM, reducing/eliminating soft tissue swelling/inflammation/restriction, improving soft tissue extensibility and allowing for proper ROM for normal function with self care, reaching, carrying, lifting, house/yardwork, driving/computer work    Modalities:    PM/CP x 15' R shoulder    Charges:  Timed Code Treatment Minutes: 43   Total Treatment Minutes: 59     [] EVAL (LOW) 59737 (typically 20 minutes face-to-face)  [] EVAL (MOD) 56442 (typically 30 minutes face-to-face)  [] EVAL (HIGH) 06430 (typically 45 minutes face-to-face)  [] RE-EVAL     [x] IS(73527) x  2   [] IONTO  [] NMR (11195) x      [] VASO  [x] Manual (76940) x  1    [] Other:  [] TA x       [] Mech Traction (02875)  [] ES(attended) (41897)      [x] ES (un) (13923):     GOALS  Patient stated goal: To be able to brush her hair and put on make up     Therapist goals for Patient:   Short Term Goals: To be achieved in: 2 weeks  1. Independent in HEP and progression per patient tolerance, in order to prevent re-injury. 2. Patient will have a decrease in pain to facilitate improvement in movement, function, and ADLs as indicated by Functional Deficits.     Long Term Goals: To be achieved in: 8 weeks  1. Disability index score of 30% or less for the [facility] to assist with reaching prior level of function. 2. Patient will demonstrate increased AROM to shoulder flex and abd 130, IR: T12, ER: T1 to allow for proper joint functioning as indicated by patients Functional Deficits.    3.

## 2017-10-12 ENCOUNTER — HOSPITAL ENCOUNTER (OUTPATIENT)
Dept: PHYSICAL THERAPY | Age: 62
Discharge: HOME | End: 2017-10-12
Admitting: ORTHOPAEDIC SURGERY

## 2017-10-12 NOTE — FLOWSHEET NOTE
[facility], [address]  Phone: [phone]  Fax [phone]      Physical Therapy Daily Treatment Note  Date:  10/12/2017    Patient Name:  Shy Clayton    :  1955  MRN: [id number]  Restrictions/Precautions:  Surgery on 17 RCR massive tear  Medical/Treatment Diagnosis Information:  · Diagnosis: R Rotator Cuff Tear - M75.121  · Treatment Diagnosis: R Rotator cuff tear - M75.121 / R shoulder stiffness M25.611/ R shoulder pain B58.362  Insurance/Certification information:  PT Insurance Information: [place] [facility] - 20 visits   Physician Information:  Referring Practitioner: Dr. Katie Arais of care signed (Y/N): NO    Date of Patient follow up with Physician: 17       G-Code (if applicable):                                                                                     Date G-Code Applied:  17  PT G-Codes  Functional Assessment Tool Used: QuickDASH  Score: 60%  Functional Limitation: Carrying, moving and handling objects  Carrying, Moving and Handling Objects Current Status (): At least 60 percent but less than 80 percent impaired, limited or restricted  Carrying, Moving and Handling Objects Goal Status (): At least 20 percent but less than 40 percent impaired, limited or restricted    Progress Note: [x]  Yes  []  No  Next due by: Visit #10      Latex Allergy:  [x]NO      []YES  Preferred Language for Healthcare:   [x]English       []other:    Visit # Insurance Allowable   6 20     Pain level:  1-2/10     SUBJECTIVE: Patient reports her shoulder does not hurt much. Gets tired with exercises.      OBJECTIVE:   Observation: tight inf/post capsule  Test measurements: NT this date    RESTRICTIONS/PRECAUTIONS: L shoulder weakness in abduction/ limited cervical ROM     Exercises/Interventions:   Therapeutic Ex Sets/rep comments   HEP only   HEP only   Pt educated for HEP  HEP only   HEP   HEP Cane ER supine/cane flexion supine 10x10\" ea    Supine bicep curl 1# 20x with scap retraction    Finisher flexion alt R/L foot fwd 2# 10x each    Finisher small\" wax on/wax off\" BUEs 2# 10x    SL ER With PT assist 10x    Seated no $ 10x5\" +HEP 10/3/17   Band rows with scap retraction YTB 2x10x3\"              Wall slides 2 x 5 Cueing to avoid shoulder hike        pulleys 3' flexion              Manual Intervention     GII oscillations and post/inf GH mobs, PROM ER and flexion 12'                                  NMR re-education     Seated submax isos 25% FLX/ER/IR/EXT 5x5\" each                                           Therapeutic Exercise and NMR EXR  [x] (64027) Provided verbal/tactile cueing for activities related to strengthening, flexibility, endurance, ROM  for improvements in scapular, scapulothoracic and UE control with self care, reaching, carrying, lifting, house/yardwork, driving/computer work.    [] (69965) Provided verbal/tactile cueing for activities related to improving balance, coordination, kinesthetic sense, posture, motor skill, proprioception  to assist with  scapular, scapulothoracic and UE control with self care, reaching, carrying, lifting, house/yardwork, driving/computer work. Therapeutic Activities:    [] (07031 or 01404) Provided verbal/tactile cueing for activities related to improving balance, coordination, kinesthetic sense, posture, motor skill, proprioception and motor activation to allow for proper function of scapular, scapulothoracic and UE control with self care, carrying, lifting, driving/computer work.      Home Exercise Program:    [x] (85975) Reviewed/Progressed HEP activities related to strengthening, flexibility, endurance, ROM of scapular, scapulothoracic and UE control with self care, reaching, carrying, lifting, house/yardwork, driving/computer work  [] (45115) Reviewed/Progressed HEP activities related to improving balance, coordination, kinesthetic sense, posture,

## 2017-10-17 ENCOUNTER — HOSPITAL ENCOUNTER (OUTPATIENT)
Dept: PHYSICAL THERAPY | Age: 62
Discharge: HOME | End: 2017-10-17
Admitting: ORTHOPAEDIC SURGERY

## 2017-10-17 NOTE — FLOWSHEET NOTE
[facility], [address] [place]  Phone: [phone]  Fax [phone]      Physical Therapy Daily Treatment Note  Date:  10/17/2017    Patient Name:  Lan Lema    :  1955  MRN: [id number]  Restrictions/Precautions:  Surgery on 17 RCR massive tear  Medical/Treatment Diagnosis Information:  · Diagnosis: R Rotator Cuff Tear - M75.121  · Treatment Diagnosis: R Rotator cuff tear - M75.121 / R shoulder stiffness M25.611/ R shoulder pain V01.644  Insurance/Certification information:  PT Insurance Information: [employer] - 20 visits   Physician Information:  Referring Practitioner: Dr. Gladis Pandey of care signed (Y/N): NO    Date of Patient follow up with Physician: 17       G-Code (if applicable):                                                                                     Date G-Code Applied:  17  PT G-Codes  Functional Assessment Tool Used: QuickDASH  Score: 60%  Functional Limitation: Carrying, moving and handling objects  Carrying, Moving and Handling Objects Current Status (): At least 60 percent but less than 80 percent impaired, limited or restricted  Carrying, Moving and Handling Objects Goal Status (): At least 20 percent but less than 40 percent impaired, limited or restricted    Progress Note: [x]  Yes  []  No  Next due by: Visit #10      Latex Allergy:  [x]NO      []YES  Preferred Language for Healthcare:   [x]English       []other:    Visit # Insurance Allowable   7 20     Pain level:  1-2/10     SUBJECTIVE: Shoulder fells ok. It is  A little sore but I ran into the door with it! Even reducing the time, sleeping on my right side now and I can sleep. Able to brush hair.     OBJECTIVE:   Observation: tight inf/post capsule  Test measurements: PROM ER at 80 deg ABD:80; supine cane     RESTRICTIONS/PRECAUTIONS: L shoulder weakness in abduction/ limited cervical ROM functioning as indicated by patients Functional Deficits. 3. Patient will demonstrate an increase in Strength to 4/5 in the right arm to allow for proper functional mobility as indicated by patients Functional Deficits. 4. Patient will return to self care activities without increased symptoms or restriction. 5. 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 [] Progression is slowed due to complexities listed. [] Progression has been slowed due to co-morbidities.   [] Plan just implemented, too soon to assess goals progression  [] Other:     ASSESSMENT:  Progress toward all goals    Treatment/Activity Tolerance:  [x] Patient tolerated treatment well [] Patient limited by fatique  [] Patient limited by pain  [] Patient limited by other medical complications  [] Other:     Prognosis: [x] Good [] Fair  [] Poor    Patient Requires Follow-up: [x] Yes  [] No    PLAN: See eval for full POC;   [x] Continue per plan of care [] Alter current plan (see comments)  [] Plan of care initiated [] Hold pending MD visit [] Discharge    Electronically signed by: Ro Naik [place], [id number]

## 2017-10-19 ENCOUNTER — HOSPITAL ENCOUNTER (OUTPATIENT)
Dept: PHYSICAL THERAPY | Age: 62
Discharge: HOME | End: 2017-10-19
Admitting: ORTHOPAEDIC SURGERY

## 2017-10-19 NOTE — FLOWSHEET NOTE
[facility], [address] [place]  Phone: [phone]  Fax [phone]      Physical Therapy Daily Treatment Note  Date:  10/19/2017    Patient Name:  Sid Erickson    :  1955  MRN: [id number]  Restrictions/Precautions:  Surgery on 17 RCR massive tear  Medical/Treatment Diagnosis Information:  · Diagnosis: R Rotator Cuff Tear - M75.121  · Treatment Diagnosis: R Rotator cuff tear - M75.121 / R shoulder stiffness M25.611/ R shoulder pain B84.255  Insurance/Certification information:  PT Insurance Information: [employer] - 20 visits   Physician Information:  Referring Practitioner: Dr. Deo Latif of care signed (Y/N): NO    Date of Patient follow up with Physician: 17       G-Code (if applicable):                                                                                     Date G-Code Applied:  17  PT G-Codes  Functional Assessment Tool Used: QuickDASH  Score: 60%  Functional Limitation: Carrying, moving and handling objects  Carrying, Moving and Handling Objects Current Status (): At least 60 percent but less than 80 percent impaired, limited or restricted  Carrying, Moving and Handling Objects Goal Status ():  At least 20 percent but less than 40 percent impaired, limited or restricted    Progress Note: [x]  Yes  []  No  Next due by: Visit #10      Latex Allergy:  [x]NO      []YES  Preferred Language for Healthcare:   [x]English       []other:    Visit # Insurance Allowable   7 20     Pain level:  1-2/10     SUBJECTIVE:     OBJECTIVE: Pt 20' late for appt; therex not performed was due to time constraints  Observation: tight inf/post capsule  Test measurements:     RESTRICTIONS/PRECAUTIONS: L shoulder weakness in abduction/ limited cervical ROM     Exercises/Interventions:   Therapeutic Ex Sets/rep comments   HEP only   HEP only   Pt educated for HEP  HEP only   HEP   HEP   /cane flexion supine

## 2017-10-24 ENCOUNTER — HOSPITAL ENCOUNTER (OUTPATIENT)
Dept: PHYSICAL THERAPY | Age: 62
Discharge: HOME | End: 2017-10-24
Admitting: ORTHOPAEDIC SURGERY

## 2017-10-24 NOTE — FLOWSHEET NOTE
[facility],  [address] [place]  Phone: [phone]  Fax [phone]      Physical Therapy Daily Treatment Note  Date:  10/24/2017    Patient Name:  Sana Hernández    :  1955  MRN: [id number]  Restrictions/Precautions:  Surgery on 17 RCR massive tear  Medical/Treatment Diagnosis Information:  · Diagnosis: R Rotator Cuff Tear - M75.121  · Treatment Diagnosis: R Rotator cuff tear - M75.121 / R shoulder stiffness M25.611/ R shoulder pain D45.198  Insurance/Certification information:  PT Insurance Information: Svitlana Fuller - 20 visits   Physician Information:  Referring Practitioner: Dr. Melvi Armando of care signed (Y/N): NO    Date of Patient follow up with Physician: 17       G-Code (if applicable):                                                                                     Date G-Code Applied:  17  PT G-Codes  Functional Assessment Tool Used: QuickDASH  Score: 27%  Functional Limitation: Carrying, moving and handling objects  [address]    Progress Note: [x]  Yes  []  No  Next due by: Visit #10      Latex Allergy:  [x]NO      []YES  Preferred Language for Healthcare:   [x]English       []other:    Visit # Insurance Allowable   9 20     Pain level:  1-2/10     SUBJECTIVE: \"Had  and it has been stressful. Haven't done as many exercises as I should have probably. Neck is bothering me. \"    OBJECTIVE:   Observation: minimal capsular tightness  Test measurements: PROM ER @ ); 65 @60: 70 @ 90:80            AAROM FLX with cane supine: 160    RESTRICTIONS/PRECAUTIONS: L shoulder weakness in abduction/ limited cervical ROM     Exercises/Interventions:   Therapeutic Ex Sets/rep comments   HEP only   HEP only   Pt educated for HEP  HEP only   HEP   HEP   /cane flexion supine 10x10\" ea          Finisher flexion alt R/L foot fwd 3# 10x each    Finisher RUE arc with LUE grounded 3# 10x    Finisher 3 Oscillations-Mobs:  G-I, II, III, IV (PA's, Inf., Post.)  [x] (89800) Provided manual therapy to mobilize soft tissue/joints of cervical/CT, scapular GHJ and UE for the purpose of modulating pain, promoting relaxation,  increasing ROM, reducing/eliminating soft tissue swelling/inflammation/restriction, improving soft tissue extensibility and allowing for proper ROM for normal function with self care, reaching, carrying, lifting, house/yardwork, driving/computer work    Modalities:   PM/CP x 15' R shoulder     Charges:  Timed Code Treatment Minutes: 38   Total Treatment Minutes: 53     [] EVAL (LOW) 48290 (typically 20 minutes face-to-face)  [] EVAL (MOD) 68234 (typically 30 minutes face-to-face)  [] EVAL (HIGH) 23180 (typically 45 minutes face-to-face)  [] RE-EVAL     [x] GA(89925) x  2   [] IONTO  [] NMR (20328) x      [] VASO  [x] Manual (19806) x  1    [] Other:  [] TA x       [] Mech Traction (19562)  [] ES(attended) (20519)      [x] ES (un) (90897):     GOALS  Patient stated goal: To be able to brush her hair and put on make up     Therapist goals for Patient:   Short Term Goals: To be achieved in: 2 weeks  1. Independent in HEP and progression per patient tolerance, in order to prevent re-injury. MET  2. Patient will have a decrease in pain to facilitate improvement in movement, function, and ADLs as indicated by Functional Deficits. MET     Long Term Goals: To be achieved in: 8 weeks  1. Disability index score of 30% or less for the [facility] to assist with reaching prior level of function. MET  2. Patient will demonstrate increased AROM to shoulder flex and abd 130, IR: T12, ER: T1 to allow for proper joint functioning as indicated by patients Functional Deficits. 3. Patient will demonstrate an increase in Strength to 4/5 in the right arm to allow for proper functional mobility as indicated by patients Functional Deficits.    4. Patient will return to self care activities without increased symptoms or

## 2017-10-26 ENCOUNTER — HOSPITAL ENCOUNTER (OUTPATIENT)
Dept: PHYSICAL THERAPY | Age: 62
Discharge: HOME | End: 2017-10-26
Admitting: ORTHOPAEDIC SURGERY

## 2017-10-26 NOTE — FLOWSHEET NOTE
Finisher RUE arc with LUE grounded 3# 10x    Finisher 3 way RUE 3# 10x each    SL ER 3 x 5 Improved endurance for activity today    +HEP 10/3/17   Band rows with scap retraction RTB 3x10x3\"    TB IR/ER YTB10x each         Wall slides upper ROM only 10x         pulleys 3' flexion              Manual Intervention     GII oscillations and post/inf GH mobs, PROM ER and flexion 10'    UT stretch, GII cx PAs supine 5'                             NMR re-education                                            Therapeutic Exercise and NMR EXR  [x] (13969) Provided verbal/tactile cueing for activities related to strengthening, flexibility, endurance, ROM  for improvements in scapular, scapulothoracic and UE control with self care, reaching, carrying, lifting, house/yardwork, driving/computer work.    [] (84181) Provided verbal/tactile cueing for activities related to improving balance, coordination, kinesthetic sense, posture, motor skill, proprioception  to assist with  scapular, scapulothoracic and UE control with self care, reaching, carrying, lifting, house/yardwork, driving/computer work. Therapeutic Activities:    [] (40109 or 22037) Provided verbal/tactile cueing for activities related to improving balance, coordination, kinesthetic sense, posture, motor skill, proprioception and motor activation to allow for proper function of scapular, scapulothoracic and UE control with self care, carrying, lifting, driving/computer work.      Home Exercise Program:    [x] (74692) Reviewed/Progressed HEP activities related to strengthening, flexibility, endurance, ROM of scapular, scapulothoracic and UE control with self care, reaching, carrying, lifting, house/yardwork, driving/computer work  [] (60228) Reviewed/Progressed HEP activities related to improving balance, coordination, kinesthetic sense, posture, motor skill, proprioception of scapular, scapulothoracic and UE control with self care, reaching, carrying, lifting, Deficits. -PROGRESSING  4. Patient will return to self care activities without increased symptoms or restriction. -MET  5. 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 [] Progression is slowed due to complexities listed. [] Progression has been slowed due to co-morbidities. [] Plan just implemented, too soon to assess goals progression  [] Other:     ASSESSMENT:  Pt did well with side lying activities today. See POC for updated info.     Treatment/Activity Tolerance:  [x] Patient tolerated treatment well [] Patient limited by fatique  [] Patient limited by pain  [] Patient limited by other medical complications  [] Other:     Prognosis: [x] Good [] Fair  [] Poor    Patient Requires Follow-up: [x] Yes  [] No    PLAN: See eval for full POC;  [x] Continue per plan of care [] Alter current plan (see comments)  [] Plan of care initiated [] Hold pending MD visit [] Discharge    Electronically signed by: Dinora Harden PT, DPT [id number]

## 2017-10-26 NOTE — PLAN OF CARE
[facility], [address] [facility]  [address] [place] [address], [address] [place] [address]  Phone: [phone]  Fax [phone]     Physical Therapy Re-Certification Plan of Care    Dear Dr. Aure Lyon  ,    We had the pleasure of treating the following patient for physical therapy services at [address] [place] [address]. A summary of our findings can be found in the updated assessment below. This includes our plan of care. If you have any questions or concerns regarding these findings, please do not hesitate to contact me at the office phone number checked above. Thank you for the referral.     Physician Signature:________________________________Date:__________________  By signing above, therapists plan is approved by physician      Patient: Dara Muhammad   : 1955   MRN: [id number]  Referring Physician:        Evaluation Date: 10/26/2017      Medical Diagnosis Information:  ·   R Rotator Cuff Tear - M75.121  ·   R Rotator cuff tear - M75.121 / R shoulder stiffness M25.611/ R shoulder pain M25.511  Insurance information:      Date Range:17-10/26/17  Total visits:10      G-Codes: (if applicable)   50% LEFS  Functional Index used: LEFS    SUBJECTIVE: Pt reports she can do more with her arm now, most of her soreness is at the end of the day. 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     Current Pain Scale: 0/10    Type: []Constant   [x]Intermitment  []Radiating []Localized  []other:     Functional Limitations: [x]Lifting/reaching []Grooming  []Carrying []ADL's  []Driving []Sports/Recreations   []Other:      OBJECTIVE:   · PROM ER ; 65 @60: 70 @ 90:80                       AAROM FLX with cane supine: 160    Joint mobility:   [x]Normal    []Hypo   []Hyper    Palpation: Tightness in UT/levator      ASSESSMENT: Isidra Martinez is progressing well with therapy, her ROM is very good and is only limited in strength at this point in time. Response to Treatment:   [x]Patient is responding well to treatment and improvement is noted with regards  to goals   []Patient should continue to improve in reasonable time if they continue HEP   []Patient has plateaued and is no longer responding to skilled PT intervention    []Patient is getting worse and would benefit from return to referring MD   []Patient unable to adhere to initial POC      Functional deficiencies which affect ADL's and Reduce overall functional level:     [x]decreased RC/scapular strength and neuromuscular control - Reduced overall  functional level with carrying /lifting   []decreased UE ROM/joint mobility- Reduced overall functional level with  carrying /lifting    []pain/difficulty with driving and/or computer work- Reduced overall functional  level    []pain at end of day with ADL tasks- Reduced overall functional level   [x]pain/difficulty with lifting/reaching/carrying - Reduced overall functional level  with carrying and lifting   []unable to perform sport/recreational activity due to pain and dysfunction   []other:       Prognosis/Rehab Potential:    []Excellent   [x]Good    []Fair   []Poor: Toleration of evaluation or treatment:    []Excellent   [x]Good    []Fair   []Poor     New or Updated Goals (if applicable):  [x] No change to goals established upon initial eval/last progress note:  New Goals:       GOALS: Patient stated goal: To be able to brush her hair and put on make upMET     Therapist goals for Patient:   Short Term Goals: To be achieved in: 2 weeks  1. Independent in HEP and progression per patient tolerance, in order to prevent re-injury. MET  2. Patient will have a decrease in pain to facilitate improvement in movement, function, and ADLs as indicated by Functional Deficits. MET     Long Term Goals: To be achieved in: 8 weeks  1. Disability index score of 30% or less for the [facility] assist with reaching prior level of function. MET  2.

## 2017-11-01 ENCOUNTER — HOSPITAL ENCOUNTER (OUTPATIENT)
Dept: PHYSICAL THERAPY | Age: 62
Discharge: HOME | End: 2017-11-30
Attending: ORTHOPAEDIC SURGERY | Admitting: ORTHOPAEDIC SURGERY

## 2017-11-02 ENCOUNTER — HOSPITAL ENCOUNTER (OUTPATIENT)
Dept: PHYSICAL THERAPY | Age: 62
Discharge: HOME | End: 2017-11-02
Admitting: ORTHOPAEDIC SURGERY

## 2017-11-02 NOTE — FLOWSHEET NOTE
[facility], [address]  Phone: [phone]  Fax [phone]      Physical Therapy Daily Treatment Note  Date:  2017    Patient Name:  Jeanna Mckeon    :  1955  MRN: [id number]  Restrictions/Precautions:  Surgery on 17 RCR massive tear  Medical/Treatment Diagnosis Information:  · Diagnosis: R Rotator Cuff Tear - M75.121  · Treatment Diagnosis: R Rotator cuff tear - M75.121 / R shoulder stiffness M25.611/ R shoulder pain K05.027  Insurance/Certification information:  PT Insurance Information: Ana Lilia Nguyen - 20 visits   Physician Information:  Referring Practitioner: Dr. Jaswinder Olvera of care signed (Y/N): NO    Date of Patient follow up with Physician: 17       G-Code (if applicable):                                                                                     Date G-Code Applied:  10/24/17  PT G-Codes  Functional Assessment Tool Used: QuickDASH  Score: 27%  Functional Limitation: Carrying, moving and handling objects   CJ  G 8985 CJ    Progress Note: [x]  Yes  []  No  Next due by: Visit #10      Latex Allergy:  [x]NO      []YES  Preferred Language for Healthcare:   [x]English       []other:    Visit # Insurance Allowable   11 20     Pain level:  0/10     SUBJECTIVE: Pt states she is not having any pain today, has been using her arm more and just feels a little sore at the end of the day but states she expects that to happen.     OBJECTIVE:   Observation: TPs in post cuff  Test measurements:                 RESTRICTIONS/PRECAUTIONS: L shoulder weakness in abduction/ limited cervical ROM     Exercises/Interventions:   Therapeutic Ex Sets/rep comments   Upper trap stretch  3 x 15 sec R only    Levator stretch 3 x 15 sec R only                    cane flexion supine 10x10\" ea    Finisher flexion alt R/L foot fwd 3# 10x each    Finisher cross over BUEs 3# 10x    Finisher 3 way RUE 3# 10x each SL ABD 2x10    SL ER 2x10 I    +HEP 10/3/17   Band rows with scap retraction GTB 2x10x3\"    TB IR/ER YTB 2x10x each         Wall slides upper 3D Ea 10x         pulleys 5' flexion              Manual Intervention     GII oscillations and post/inf GH mobs, PROM ER and flexion 10'    UT stretch, GII cx PAs supine 5'                             NMR re-education                                            Therapeutic Exercise and NMR EXR  [x] (79001) Provided verbal/tactile cueing for activities related to strengthening, flexibility, endurance, ROM  for improvements in scapular, scapulothoracic and UE control with self care, reaching, carrying, lifting, house/yardwork, driving/computer work.    [] (23203) Provided verbal/tactile cueing for activities related to improving balance, coordination, kinesthetic sense, posture, motor skill, proprioception  to assist with  scapular, scapulothoracic and UE control with self care, reaching, carrying, lifting, house/yardwork, driving/computer work. Therapeutic Activities:    [] (57204 or 99877) Provided verbal/tactile cueing for activities related to improving balance, coordination, kinesthetic sense, posture, motor skill, proprioception and motor activation to allow for proper function of scapular, scapulothoracic and UE control with self care, carrying, lifting, driving/computer work.      Home Exercise Program:    [x] (78634) Reviewed/Progressed HEP activities related to strengthening, flexibility, endurance, ROM of scapular, scapulothoracic and UE control with self care, reaching, carrying, lifting, house/yardwork, driving/computer work  [] (22588) Reviewed/Progressed HEP activities related to improving balance, coordination, kinesthetic sense, posture, motor skill, proprioception of scapular, scapulothoracic and UE control with self care, reaching, carrying, lifting, house/yardwork, driving/computer work      Manual Treatments:  PROM / STM / Oscillations-Mobs:  G-I, II, III, IV (Jaxon, Inf., Post.)  [x] (98734) Provided manual therapy to mobilize soft tissue/joints of cervical/CT, scapular GHJ and UE for the purpose of modulating pain, promoting relaxation,  increasing ROM, reducing/eliminating soft tissue swelling/inflammation/restriction, improving soft tissue extensibility and allowing for proper ROM for normal function with self care, reaching, carrying, lifting, house/yardwork, driving/computer work    Modalities:   PM/CP x 15' R shoulder     Charges:  Timed Code Treatment Minutes: 40   Total Treatment Minutes: 55     [] EVAL (LOW) 71071 (typically 20 minutes face-to-face)  [] EVAL (MOD) 88114 (typically 30 minutes face-to-face)  [] EVAL (HIGH) 04586 (typically 45 minutes face-to-face)  [] RE-EVAL     [x] PB(81406) x  2   [] IONTO  [] NMR (70685) x      [] VASO  [x] Manual (24100) x  1    [] Other:  [] TA x       [] Mech Traction (74925)  [] ES(attended) (01434)      [x] ES (un) (02685):     GOALS  Patient stated goal: To be able to brush her hair and put on make up     Therapist goals for Patient:   Short Term Goals: To be achieved in: 2 weeks  1. Independent in HEP and progression per patient tolerance, in order to prevent re-injury. MET  2. Patient will have a decrease in pain to facilitate improvement in movement, function, and ADLs as indicated by Functional Deficits. MET     Long Term Goals: To be achieved in: 8 weeks  1. Disability index score of 30% or less for the [facility] to assist with reaching prior level of function. MET  2. Patient will demonstrate increased AROM to shoulder flex and abd 130, IR: T12, ER: T1 to allow for proper joint functioning as indicated by patients Functional Deficits. -PROGRESSING  3. Patient will demonstrate an increase in Strength to 4/5 in the right arm to allow for proper functional mobility as indicated by patients Functional Deficits. -PROGRESSING  4.  Patient will return to self care activities without increased symptoms or restriction. -MET  5. 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 [] Progression is slowed due to complexities listed. [] Progression has been slowed due to co-morbidities. [] Plan just implemented, too soon to assess goals progression  [] Other:     ASSESSMENT:  Pt did well with side lying activities today. Better overhead ease of motion.     Treatment/Activity Tolerance:  [x] Patient tolerated treatment well [] Patient limited by fatique  [] Patient limited by pain  [] Patient limited by other medical complications  [] Other:     Prognosis: [x] Good [] Fair  [] Poor    Patient Requires Follow-up: [x] Yes  [] No    PLAN: See eval for full POC;AT NV  [x] Continue per plan of care [] Alter current plan (see comments)  [] Plan of care initiated [] Hold pending MD visit [] Discharge    Electronically signed by: Parker Pope [place], [id number]

## 2017-11-06 ENCOUNTER — HOSPITAL ENCOUNTER (OUTPATIENT)
Dept: PHYSICAL THERAPY | Age: 62
Discharge: HOME | End: 2017-11-06
Admitting: ORTHOPAEDIC SURGERY

## 2017-11-06 NOTE — FLOWSHEET NOTE
[facility],  [address] [place]  Phone: [phone]  Fax [phone]      Physical Therapy Daily Treatment Note  Date:  2017    Patient Name:  Paradise Galicia    :  1955  MRN: [id number]  Restrictions/Precautions:  Surgery on 17 RCR massive tear  Medical/Treatment Diagnosis Information:  · Diagnosis: R Rotator Cuff Tear - M75.121  · Treatment Diagnosis: R Rotator cuff tear - M75.121 / R shoulder stiffness M25.611/ R shoulder pain Q55.307  Insurance/Certification information:  PT Insurance Information: Hyun Abreu - 20 visits   Physician Information:  Referring Practitioner: Dr. Pastor School of care signed (Y/N): NO    Date of Patient follow up with Physician: 17       G-Code (if applicable):                                                                                     Date G-Code Applied:  10/24/17  PT G-Codes  Functional Assessment Tool Used: QuickDASH  Score: 27%  Functional Limitation: Carrying, moving and handling objects  [address] C[address]    Progress Note: [x]  Yes  []  No  Next due by: Visit #10      Latex Allergy:  [x]NO      []YES  Preferred Language for Healthcare:   [x]English       []other:    Visit # Insurance Allowable   12 20     Pain level:  0/10     SUBJECTIVE: \"I feel like I am getting tighter but less painful. I have been doing a lot more with my arm at work. \"    OBJECTIVE:   Observation:   Test measurements: PROM ER @ ABD90 de; AAROM supine cane FLX: 160                RESTRICTIONS/PRECAUTIONS: L shoulder weakness in abduction/ limited cervical ROM     Exercises/Interventions:   Therapeutic Ex Sets/rep comments              Prone scap retraction  Rows  ext   0# 15x3\"  0# 15x3\"         cane flexion supine 2 positions 10x10\" ea    Finisher flexion alt R/L foot fwd 3# 10x each    Finisher cross over BUEs R/L foot fwd 3# 10x    Finisher 3 way RUE 3# 10x each    SL ABD 3x10    SL ER Provided manual therapy to mobilize soft tissue/joints of cervical/CT, scapular GHJ and UE for the purpose of modulating pain, promoting relaxation,  increasing ROM, reducing/eliminating soft tissue swelling/inflammation/restriction, improving soft tissue extensibility and allowing for proper ROM for normal function with self care, reaching, carrying, lifting, house/yardwork, driving/computer work    Modalities:   PM/CP x 15' R shoulder     Charges:  Timed Code Treatment Minutes: 43   Total Treatment Minutes: 57     [] EVAL (LOW) 91562 (typically 20 minutes face-to-face)  [] EVAL (MOD) 95770 (typically 30 minutes face-to-face)  [] EVAL (HIGH) 42194 (typically 45 minutes face-to-face)  [] RE-EVAL     [x] LW(85343) x  2   [] IONTO  [] NMR (40514) x      [] VASO  [x] Manual (92360) x  1    [] Other:  [] TA x       [] Mech Traction (02208)  [] ES(attended) (08376)      [x] ES (un) (74449):     GOALS  Patient stated goal: To be able to brush her hair and put on make upMET     Therapist goals for Patient:   Short Term Goals: To be achieved in: 2 weeks  1. Independent in HEP and progression per patient tolerance, in order to prevent re-injury. MET  2. Patient will have a decrease in pain to facilitate improvement in movement, function, and ADLs as indicated by Functional Deficits. MET     Long Term Goals: To be achieved in: 8 weeks  1. Disability index score of 30% or less for the [facility] to assist with reaching prior level of function. MET  2. Patient will demonstrate increased AROM to shoulder flex and abd 130, IR: T12, ER: T1 to allow for proper joint functioning as indicated by patients Functional Deficits. -PROGRESSING  3. Patient will demonstrate an increase in Strength to 4/5 in the right arm to allow for proper functional mobility as indicated by patients Functional Deficits. -PROGRESSING  4. Patient will return to self care activities without increased symptoms or restriction. -MET  5.  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 [] Progression is slowed due to complexities listed. [] Progression has been slowed due to co-morbidities. [] Plan just implemented, too soon to assess goals progression  [] Other:     ASSESSMENT:  Pt regained ROM after manuals this visit and tolerated AT work well.      Treatment/Activity Tolerance:  [x] Patient tolerated treatment well [] Patient limited by fatique  [] Patient limited by pain  [] Patient limited by other medical complications  [] Other:     Prognosis: [x] Good [] Fair  [] Poor    Patient Requires Follow-up: [x] Yes  [] No    PLAN: See eval for full POC;  [x] Continue per plan of care [] Alter current plan (see comments)  [] Plan of care initiated [] Hold pending MD visit [] Discharge    Electronically signed by: Brittney Arango [place], [id number]

## 2017-11-09 ENCOUNTER — HOSPITAL ENCOUNTER (OUTPATIENT)
Dept: PHYSICAL THERAPY | Age: 62
Discharge: HOME | End: 2017-11-09
Admitting: ORTHOPAEDIC SURGERY

## 2017-11-09 NOTE — FLOWSHEET NOTE
-PROGRESSING                Progression Towards Functional goals:  [x] Patient is progressing as expected towards functional goals listed. [] Progression is slowed due to complexities listed. [] Progression has been slowed due to co-morbidities. [] Plan just implemented, too soon to assess goals progression  [] Other:     ASSESSMENT:  Pt did well with all activities today, had just a little tightness upon arrival to PT but ROM was good throughout. Treatment/Activity Tolerance:  [x] Patient tolerated treatment well [] Patient limited by fatique  [] Patient limited by pain  [] Patient limited by other medical complications  [] Other:     Prognosis: [x] Good [] Fair  [] Poor    Patient Requires Follow-up: [x] Yes  [] No    PLAN: Work with ATC at [address] again, work on additional scap strengthening.   [x] Continue per plan of care [] Alter current plan (see comments)  [] Plan of care initiated [] Hold pending MD visit [] Discharge    Electronically signed by: Jim Gonsales PT, DPT, [id number]

## 2017-11-13 ENCOUNTER — HOSPITAL ENCOUNTER (OUTPATIENT)
Dept: PHYSICAL THERAPY | Age: 62
Discharge: HOME | End: 2017-11-13
Admitting: ORTHOPAEDIC SURGERY

## 2017-11-13 NOTE — OP NOTE
[facility], [address] [place]  Phone: [phone]  Fax [phone]    Date: 11/13/2017  Physician: Lorin Cassidy  Patient: Brenden Gayle Diagnosis: R RTC tear    Patient has received 14 sessions of Physical Therapy over a 10 week period. Functional Questionnaire Score: Initial 60%, Current 27%  Pain reported has decreased from 2/10 to 0-1/10    AROM Initial (R) Initial (L) Current (R) Current (L)   Flexion 38  123    Abduction 35  92    Functional ER Chin  C7    Functional IR R glut fold  Belt line    PROM       Flexion   164    ER   85 at 90 deg abd    IR   ~40 deg at 90 deg abd           Strength       Flexion   4-/5    Abduction   3+/5    ER   3+/5    IR   4-/5             Specific Functional Improvement and Impression:  Patient has made steady progression with ROM since beginning PT. Strength is also progressing, but has been slow. Scapular control and cuff strength is improving, but continues to demonstrate shoulder shrug with elevation. Overall, patient is progressing well towards goals. Summary:   [x] Patient is progressing as expected for this condition   [] Patient is progressing, but slower than expected for this condition   [] Patient is not progressing  Clinician Recommendations:   [x] Continue rehabilitation due to objective improvement and continued functional deficits. [] Follow up periodically to advance home exercise program to match level of function. [] Continue rehabitation due to objective improvement and continued functional deficits with progression to work conditioning. [] Discharge to post-rehab program secondary to maximizing \"medical necessity\" of physical / occupational therapy.    [] Discharge independent in a home program as:  [] All goals achieved  [] Maximized \"medical necessity\" of physical / occupational therapy  [] No subjective or objective improvements    Plan: 1-2x/wk for 6 weeks due

## 2017-11-13 NOTE — FLOWSHEET NOTE
[facility],  [address] [place]  Phone: [phone]  Fax [phone]    ATHLETIC TRAINING [address]  Date:  2017    Patient Name:  Dara Muhammad    :  [birth year]  MRN: [id number]  Restrictions/Precautions:    Medical/Treatment Diagnosis Information:  · Diagnosis: R Rotator Cuff Tear -   · Treatment Diagnosis: R Rotator cuff tear - R shoulder stiffness  R shoulder pain Surgery on 17 RCR massive tear   Physician Information:  Dr. Anders Norris Post-op  2wks    4 wks 6 wks 8 wks   3wks 6 wks 9 wks 12 wks                        Activity Log                                                          DOS/DOI:                                                         Date: 17   ATC Commun          Plyoback      Chop                          Chest                          ER Flip          Long/Short lever  Flex. Scap.                                  ABd.           punch          Body/Cardio Blade Flex/Ext                                     IR/ER                                     ABd/ADd          Push-up      Plus                            Wall                          Table                         Floor      OVL pull back 10x  OVL pull back 15x   OVL 3D pulls 5x    Ball on Wall                 Tramp          Theraband    Rows/Ext GTB 2x10 ea  GTB 2x10 ea                          IR YTB 2x10  w/PT                         ER YTB 2x10  W/PT                          No money OVL 10x5'  OVL 15x5'                          Horiz. ABd  Lat pull down GTB 2x10                          Biceps                           Triceps                           Punches          Cable Column   Rows                                Ext.                                 Lat. Pulldown                                Biceps                                Triceps          Modality ES/CP 15' ES/CP 15'   Initials SA  SA

## 2017-11-13 NOTE — FLOWSHEET NOTE
[facility], [address] [place]  Phone: [phone]  Fax [phone]      Physical Therapy Daily Treatment Note  Date:  2017    Patient Name:  Jeanna Mckeon    :  1955  MRN: [id number]  Restrictions/Precautions:  Surgery on 17 RCR massive tear  Medical/Treatment Diagnosis Information:  · Diagnosis: R Rotator Cuff Tear - M75.121  · Treatment Diagnosis: R Rotator cuff tear - M75.121 / R shoulder stiffness M25.611/ R shoulder pain D51.848  Insurance/Certification information:  PT Insurance Information: Ana Lilia Nguyen - 20 visits   Physician Information:  Referring Practitioner: Dr. Jaswinder Olvera of care signed (Y/N): NO    Date of Patient follow up with Physician: 17       G-Code (if applicable):                                                                                     Date G-Code Applied:  10/24/17  PT G-Codes  Functional Assessment Tool Used: QuickDASH  Score: 27%  Functional Limitation: Carrying, moving and handling objects  [address]    Progress Note: [x]  Yes  []  No  Next due by: Visit #10      Latex Allergy:  [x]NO      []YES  Preferred Language for Healthcare:   [x]English       []other:    Visit # Insurance Allowable   14 20     Pain level:  0/10     SUBJECTIVE: Patient reports her shoulder is a little sore from using it more.      OBJECTIVE:   Observation:   Test measurements: See op note                RESTRICTIONS/PRECAUTIONS: L shoulder weakness in abduction/ limited cervical ROM     Exercises/Interventions:   Therapeutic Ex Sets/rep comments              Prone scap retraction  Rows  flexion   0# 2 x 10 x3\"  0# 2 x 10 x3\"         cane flexion supine 2 positions 10x10\" ea    Finisher flexion alt R/L foot fwd 3# 10x each    Finisher cross over BUEs R/L foot fwd 3# 10x    Finisher 3 way RUE 3# 10x each    SL ABD 1# 3x10    SL ER 1# 3x10 I    +HEP 10/3/17   AT   AT        Wall slides  3D

## 2017-11-20 ENCOUNTER — OFFICE VISIT (OUTPATIENT)
Dept: ORTHOPEDIC SURGERY | Age: 62
End: 2017-11-20

## 2017-11-20 VITALS
RESPIRATION RATE: 17 BRPM | SYSTOLIC BLOOD PRESSURE: 146 MMHG | HEART RATE: 55 BPM | BODY MASS INDEX: 24.4 KG/M2 | HEIGHT: 59 IN | DIASTOLIC BLOOD PRESSURE: 75 MMHG | WEIGHT: 121.03 LBS

## 2017-11-20 DIAGNOSIS — M75.121 COMPLETE TEAR OF RIGHT ROTATOR CUFF: Primary | ICD-10-CM

## 2017-11-20 PROCEDURE — G8484 FLU IMMUNIZE NO ADMIN: HCPCS | Performed by: ORTHOPAEDIC SURGERY

## 2017-11-20 PROCEDURE — G8427 DOCREV CUR MEDS BY ELIG CLIN: HCPCS | Performed by: ORTHOPAEDIC SURGERY

## 2017-11-20 PROCEDURE — 99213 OFFICE O/P EST LOW 20 MIN: CPT | Performed by: ORTHOPAEDIC SURGERY

## 2017-11-20 PROCEDURE — 3014F SCREEN MAMMO DOC REV: CPT | Performed by: ORTHOPAEDIC SURGERY

## 2017-11-20 PROCEDURE — G8420 CALC BMI NORM PARAMETERS: HCPCS | Performed by: ORTHOPAEDIC SURGERY

## 2017-11-20 PROCEDURE — 3017F COLORECTAL CA SCREEN DOC REV: CPT | Performed by: ORTHOPAEDIC SURGERY

## 2017-11-20 PROCEDURE — 1036F TOBACCO NON-USER: CPT | Performed by: ORTHOPAEDIC SURGERY

## 2017-11-22 ENCOUNTER — HOSPITAL ENCOUNTER (OUTPATIENT)
Dept: PHYSICAL THERAPY | Age: 62
Discharge: HOME | End: 2017-11-22
Admitting: ORTHOPAEDIC SURGERY

## 2017-11-22 NOTE — FLOWSHEET NOTE
[facility], [address] [place]  Phone: [phone]  Fax [phone]      Physical Therapy Daily Treatment Note  Date:  2017    Patient Name:  Juan Diehl    :  1955  MRN: [id number]  Restrictions/Precautions:  Surgery on 17 RCR massive tear  Medical/Treatment Diagnosis Information:  · Diagnosis: R Rotator Cuff Tear - M75.121  · Treatment Diagnosis: R Rotator cuff tear - M75.121 / R shoulder stiffness M25.611/ R shoulder pain T20.107  Insurance/Certification information:  PT Insurance Information: Lexi Pulido - 20 visits   Physician Information:  Referring Practitioner: Dr. Starr Liu of care signed (Y/N): NO    Date of Patient follow up with Physician: 17       G-Code (if applicable):                                                                                     Date G-Code Applied:  10/24/17  PT G-Codes  Functional Assessment Tool Used: QuickDASH  Score: 27%  Functional Limitation: Carrying, moving and handling objects  [address] C[address]    Progress Note: [x]  Yes  []  No  Next due by: Visit #10      Latex Allergy:  [x]NO      []YES  Preferred Language for Healthcare:   [x]English       []other:    Visit # Insurance Allowable   15 20     Pain level:  0/10     SUBJECTIVE: Patient reports she has no issues with work, just feels a little achey at end of day but is better after she uses ice.     OBJECTIVE:   Observation:   Test measurements: See op note                RESTRICTIONS/PRECAUTIONS: L shoulder weakness in abduction/ limited cervical ROM     Exercises/Interventions:   Therapeutic Ex Sets/rep comments              Prone scap retraction  Rows  Flexion  45 deg abd   0# 2 x 10 x3\"  0# 2 x 10 x3\"            Finisher flexion alt R/L foot fwd 3# 10x each    Finisher cross over BUEs R/L foot fwd 3# 10x    Finisher 3 way RUE 3# 10x each    SL ABD 2# 3x10    SL ER 2# 3x10 I    +HEP 10/3/17   AT   AT Wall slides  3D Ea 10x         pulleys 5' flexion         AT See AT note    Manual Intervention     GII oscillations and post/inf GH mobs, PROM ER and flexion, STM to bicep and UT R 15'                              NMR re-education     Plank position on wall, 3D reach bilat 10 x ea                                      Therapeutic Exercise and NMR EXR  [x] (38786) Provided verbal/tactile cueing for activities related to strengthening, flexibility, endurance, ROM  for improvements in scapular, scapulothoracic and UE control with self care, reaching, carrying, lifting, house/yardwork, driving/computer work.    [] (29982) Provided verbal/tactile cueing for activities related to improving balance, coordination, kinesthetic sense, posture, motor skill, proprioception  to assist with  scapular, scapulothoracic and UE control with self care, reaching, carrying, lifting, house/yardwork, driving/computer work. Therapeutic Activities:    [] (61364 or 85172) Provided verbal/tactile cueing for activities related to improving balance, coordination, kinesthetic sense, posture, motor skill, proprioception and motor activation to allow for proper function of scapular, scapulothoracic and UE control with self care, carrying, lifting, driving/computer work.      Home Exercise Program:    [x] (73227) Reviewed/Progressed HEP activities related to strengthening, flexibility, endurance, ROM of scapular, scapulothoracic and UE control with self care, reaching, carrying, lifting, house/yardwork, driving/computer work  [] (11958) Reviewed/Progressed HEP activities related to improving balance, coordination, kinesthetic sense, posture, motor skill, proprioception of scapular, scapulothoracic and UE control with self care, reaching, carrying, lifting, house/yardwork, driving/computer work      Manual Treatments:  PROM / STM / Oscillations-Mobs:  G-I, II, III, IV (PA's, Inf., Post.)  [x] (29706) Provided manual therapy to mobilize soft -PROGRESSING                Progression Towards Functional goals:  [x] Patient is progressing as expected towards functional goals listed. [] Progression is slowed due to complexities listed. [] Progression has been slowed due to co-morbidities. [] Plan just implemented, too soon to assess goals progression  [] Other:     ASSESSMENT:  Patient did well with new prone scap activity and weight bearing on wall today.     Treatment/Activity Tolerance:  [x] Patient tolerated treatment well [] Patient limited by fatique  [] Patient limited by pain  [] Patient limited by other medical complications  [] Other:     Prognosis: [x] Good [] Fair  [] Poor    Patient Requires Follow-up: [x] Yes  [] No    PLAN: Cont to work on scap strength and posture  [x] Continue per plan of care [] Alter current plan (see comments)  [] Plan of care initiated [] Hold pending MD visit [] Discharge    Electronically signed by: Gilbert Saavedra PT, DPT [id number]

## 2017-12-01 ENCOUNTER — HOSPITAL ENCOUNTER (OUTPATIENT)
Dept: PHYSICAL THERAPY | Age: 62
Discharge: HOME | End: 2017-12-31
Attending: ORTHOPAEDIC SURGERY | Admitting: ORTHOPAEDIC SURGERY

## 2017-12-04 ENCOUNTER — HOSPITAL ENCOUNTER (OUTPATIENT)
Dept: PHYSICAL THERAPY | Age: 62
Discharge: HOME | End: 2017-12-04
Admitting: ORTHOPAEDIC SURGERY

## 2017-12-04 NOTE — FLOWSHEET NOTE
[facility], [address] [place]  Phone: [phone]  Fax [phone]    ATHLETIC TRAINING [address]  Date:  2017    Patient Name:  Marley Hackett    :    MRN: [id number]  Restrictions/Precautions:    Medical/Treatment Diagnosis Information:  · Diagnosis: R Rotator Cuff Tear -   · Treatment Diagnosis: R Rotator cuff tear - R shoulder stiffness  R shoulder pain Surgery on 17 RCR massive tear   Physician Information:  Dr. Gopal Cardona Post-op  2wks    4 wks 6 wks 8 wks   3wks 6 wks 9 wks 12 wks                        Activity Log                                                          DOS/DOI:                                                         Date: 17   ATC Communication:    17 16 wks PO  Scapular weakness  VC GH rot w/ RX  Fatigued post RX but shoulder felt good Pt fell on shoulder and has been sick. Pt is tired and slightly sore          Plyoback      Chop                           Chest                           ER Flip            Long/Short lever  Flex. Scap.                                   ABd.             punch  YTB @ 1/2 wall 15x3\" YTB @ 1/2 wall 15x3\"         Body/Cardio Blade Flex/Ext                                      IR/ER                                      ABd/ADd            Push-up      Plus                             Wall                           Table                          Floor       OVL pull back 15x   OVL 3D pulls 5x      Ball on Wall                  Tramp            Theraband    Rows/Ext GTB 2x10 ea  GTB/YTB 3x10 ea GTB/YTB 2x10 ea                          IR w/PT RTB 3x10  RTB 2x10                          ER W/PT  YTB 3x10 YTB 2x10                          No money OVL 15x5'  YTB 2x15 H 3\" YTB 2x15 H 3\"                         Horiz.  ABd Lat pull down GTB 2x10  LPD GTB on SB 3x10 LPD GTB on SB 2x10                         Biceps                            Triceps                            Punches            Cable Column   Rows                                 Ext.                                  Lat. Pulldown                                 Biceps                                 Triceps            Modality ES/CP 15' Declined Declined    Initials SA  DB Sa

## 2017-12-04 NOTE — FLOWSHEET NOTE
[facility], [address] [place]  Phone: [phone]  Fax [phone]      Physical Therapy Daily Treatment Note  Date:  2017    Patient Name:  Nabila Pabon    :  1955  MRN: [id number]  Restrictions/Precautions:  Surgery on 17 RCR massive tear  Medical/Treatment Diagnosis Information:  · Diagnosis: R Rotator Cuff Tear - M75.121  · Treatment Diagnosis: R Rotator cuff tear - M75.121 / R shoulder stiffness M25.611/ R shoulder pain C54.905  Insurance/Certification information:  PT Insurance Information: Brady Mcbride - 20 visits   Physician Information:  Referring Practitioner: Dr. Nas Willis of care signed (Y/N): NO    Date of Patient follow up with Physician: 17       G-Code (if applicable):                                                                                     Date G-Code Applied:  10/24/17  PT G-Codes  Functional Assessment Tool Used: QuickDASH  Score: 27%  Functional Limitation: Carrying, moving and handling objects  [address]    Progress Note: [x]  Yes  []  No  Next due by: Visit #10      Latex Allergy:  [x]NO      []YES  Preferred Language for Healthcare:   [x]English       []other:    Visit # Insurance Allowable   16 20     Pain level:  0/10     SUBJECTIVE: Patient reports her shoulder is doing well, despite falling off of a chair yesterday and landing on her right side. Was sick last week so she had to miss her appointments. OBJECTIVE:   Observation:   Test measurements: NT this date.                  RESTRICTIONS/PRECAUTIONS: L shoulder weakness in abduction/ limited cervical ROM     Exercises/Interventions:   Therapeutic Ex Sets/rep comments              Prone scap retraction  Rows  Flexion   Horizontal Abd   1# 2 x 10 x3\"  0# 2 x 10 x3\"  0# 2 x 10            Finisher flexion alt R/L foot fwd 3# 10x each    Finisher cross over BUEs R/L foot fwd 3# 10x         SL ABD 1# 3x10

## 2017-12-08 ENCOUNTER — HOSPITAL ENCOUNTER (OUTPATIENT)
Dept: PHYSICAL THERAPY | Age: 62
Discharge: HOME | End: 2017-12-08
Admitting: ORTHOPAEDIC SURGERY

## 2017-12-08 NOTE — FLOWSHEET NOTE
[facility], [address] [place]  Phone: [phone]  Fax [phone]      Physical Therapy Daily Treatment Note  Date:  2017    Patient Name:  Jeanna Mckeon    :  1955  MRN: [id number]  Restrictions/Precautions:  Surgery on 17 RCR massive tear  Medical/Treatment Diagnosis Information:  · Diagnosis: R Rotator Cuff Tear - M75.121  · Treatment Diagnosis: R Rotator cuff tear - M75.121 / R shoulder stiffness M25.611/ R shoulder pain P78.454  Insurance/Certification information:  PT Insurance Information: Ana Lilia Nguyen - 20 visits   Physician Information:  Referring Practitioner: Dr. Jaswinder Olvera of care signed (Y/N): NO    Date of Patient follow up with Physician: 17       G-Code (if applicable):                                                                                     Date G-Code Applied:  10/24/17  PT G-Codes  Functional Assessment Tool Used: QuickDASH  Score: 27%  Functional Limitation: Carrying, moving and handling objects   CJ  G [address] CJ    Progress Note: [x]  Yes  []  No  Next due by: Visit #10      Latex Allergy:  [x]NO      []YES  Preferred Language for Healthcare:   [x]English       []other:    Visit # Insurance Allowable    20     Pain level:  0/10     SUBJECTIVE:  Pt states she has been sore since she has increased to working 4-5 hours per day 3 days in a row but knows it is from just moving it more. Overall is feeling good and continues to have no major changes in pain since her fall last week. OBJECTIVE:   Observation:   Test measurements: NT this date.                  RESTRICTIONS/PRECAUTIONS: L shoulder weakness in abduction/ limited cervical ROM     Exercises/Interventions:   Therapeutic Ex Sets/rep comments              Prone scap retraction  Rows  Flexion   Horizontal Abd   1# 2 x 10 x3\"  0# 2 x 10 x3\"  0# 2 x 10            Finisher flexion alt R/L foot fwd 3# 10x each Finisher cross over BUEs R/L foot fwd 3# 10x         SL ABD 1# 3x10    SL ER 1 # 3x10 I    +HEP 10/3/17   AT   AT        Wall slides  3D Ea 10x    Wall pushup 20x    pulleys 5' flexion         AT See AT note    Manual Intervention     GII oscillations and post/inf GH mobs, PROM ER and flexion, STM to bicep and UT R 15'                              NMR re-education        D2 flex 15x                                 Therapeutic Exercise and NMR EXR  [x] (18158) Provided verbal/tactile cueing for activities related to strengthening, flexibility, endurance, ROM  for improvements in scapular, scapulothoracic and UE control with self care, reaching, carrying, lifting, house/yardwork, driving/computer work.    [] (49276) Provided verbal/tactile cueing for activities related to improving balance, coordination, kinesthetic sense, posture, motor skill, proprioception  to assist with  scapular, scapulothoracic and UE control with self care, reaching, carrying, lifting, house/yardwork, driving/computer work. Therapeutic Activities:    [] (56199 or 13778) Provided verbal/tactile cueing for activities related to improving balance, coordination, kinesthetic sense, posture, motor skill, proprioception and motor activation to allow for proper function of scapular, scapulothoracic and UE control with self care, carrying, lifting, driving/computer work.      Home Exercise Program:    [x] (58300) Reviewed/Progressed HEP activities related to strengthening, flexibility, endurance, ROM of scapular, scapulothoracic and UE control with self care, reaching, carrying, lifting, house/yardwork, driving/computer work  [] (74680) Reviewed/Progressed HEP activities related to improving balance, coordination, kinesthetic sense, posture, motor skill, proprioception of scapular, scapulothoracic and UE control with self care, reaching, carrying, lifting, house/yardwork, driving/computer work      Manual Treatments:  PROM / STM / Oscillations-Mobs:  G-I, II, III, IV (PA's, Inf., Post.)  [x] (27223) Provided manual therapy to mobilize soft tissue/joints of cervical/CT, scapular GHJ and UE for the purpose of modulating pain, promoting relaxation,  increasing ROM, reducing/eliminating soft tissue swelling/inflammation/restriction, improving soft tissue extensibility and allowing for proper ROM for normal function with self care, reaching, carrying, lifting, house/yardwork, driving/computer work    Modalities:   PM/CP x 15' R shoulder     Charges:  Timed Code Treatment Minutes: 40   Total Treatment Minutes: 55     [] EVAL (LOW) 89277 (typically 20 minutes face-to-face)  [] EVAL (MOD) 96416 (typically 30 minutes face-to-face)  [] EVAL (HIGH) 44251 (typically 45 minutes face-to-face)  [] RE-EVAL     [x] AF(28859) x  2   [] IONTO  [] NMR (40296) x      [] VASO  [x] Manual (50663) x  1    [] Other:  [] TA x       [] Mech Traction (19571)  [] ES(attended) (06785)      [x] ES (un) (20550):     GOALS  Patient stated goal: To be able to brush her hair and put on make upMET     Therapist goals for Patient:   Short Term Goals: To be achieved in: 2 weeks  1. Independent in HEP and progression per patient tolerance, in order to prevent re-injury. MET  2. Patient will have a decrease in pain to facilitate improvement in movement, function, and ADLs as indicated by Functional Deficits. MET     Long Term Goals: To be achieved in: 8 weeks  1. Disability index score of 30% or less for the [facility] to assist with reaching prior level of function. MET  2. Patient will demonstrate increased AROM to shoulder flex and abd 130, IR: T12, ER: T1 to allow for proper joint functioning as indicated by patients Functional Deficits. -PROGRESSING  3. Patient will demonstrate an increase in Strength to 4/5 in the right arm to allow for proper functional mobility as indicated by patients Functional Deficits. -PROGRESSING  4.  Patient will return to self care activities without increased symptoms or restriction. -MET  5. 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 [] Progression is slowed due to complexities listed. [] Progression has been slowed due to co-morbidities.   [] Plan just implemented, too soon to assess goals progression  [] Other:     ASSESSMENT:  Pt decreased SL ER weight today likely due to fatigue from increased activity outside of therapy    Treatment/Activity Tolerance:  [x] Patient tolerated treatment well [] Patient limited by fatique  [] Patient limited by pain  [] Patient limited by other medical complications  [] Other:     Prognosis: [x] Good [] Fair  [] Poor    Patient Requires Follow-up: [x] Yes  [] No    PLAN: Additional weight bearing and endurance activities  [x] Continue per plan of care [] Alter current plan (see comments)  [] Plan of care initiated [] Hold pending MD visit [] Discharge    Electronically signed by: Onur Solomon DPT [id number]

## 2017-12-08 NOTE — FLOWSHEET NOTE
[facility], [address] [place]  Phone: [phone]  Fax [phone]    ATHLETIC TRAINING [address]  Date:  2017    Patient Name:  Taylor Taylor    :  [birth year]  MRN: [id number]  Restrictions/Precautions:    Medical/Treatment Diagnosis Information:  · Diagnosis: R Rotator Cuff Tear -   · Treatment Diagnosis: R Rotator cuff tear - R shoulder stiffness  R shoulder pain Surgery on 17 RCR massive tear   Physician Information:  Dr. Jacqui Guzman Post-op  2wks    4 wks 6 wks 8 wks   3wks 6 wks 9 wks 12 wks                        Activity Log                                                          DOS/DOI:                                                         Date: 17   ATC Communication:    17 16 wks PO  Scapular weakness  VC GH rot w/ RX  Fatigued post RX but shoulder felt good Pt fell on shoulder and has been sick. Pt is tired and slightly sore            Plyoback      Chop                            Chest                            ER Flip              Long/Short lever  Flex.                                     Scap.                                    ABd.               punch  YTB @ /2 wall 15x3\" YTB @ 2 wall 15x3\" YTB @  wall 15x3\"          Body/Cardio Blade Flex/Ext                                       IR/ER                                       ABd/ADd              Push-up      Plus                              Wall                            Table                           Floor        OVL pull back 15x   OVL 3D pulls 5x    OVL pullbacks 15x   Ball on Wall                   Tramp              Theraband    Rows/Ext GTB 2x10 ea  GTB/YTB 3x10 ea GTB/YTB 2x10 ea  GTB/YTB 2x10 ea                         IR w/PT RTB 3x10  RTB 2x10  RTB 2x10                         ER W/PT  YTB 3x10 YTB 2x10  YTB 2x10                         No money OVL

## 2017-12-13 ENCOUNTER — HOSPITAL ENCOUNTER (OUTPATIENT)
Dept: PHYSICAL THERAPY | Age: 62
Discharge: HOME | End: 2017-12-13
Admitting: ORTHOPAEDIC SURGERY

## 2017-12-13 NOTE — FLOWSHEET NOTE
Wall slides  3D Ea 10x    Wall pushup with SA push 20x    Standing shoulder flexion with ER iso into OVL 15x         AT See AT note    Manual Intervention     GII oscillations and post/inf GH mobs, PROM ER and flexion, STM to bicep and UT R 15'                              NMR re-education        D2 flex 15x                                 Therapeutic Exercise and NMR EXR  [x] (33885) Provided verbal/tactile cueing for activities related to strengthening, flexibility, endurance, ROM  for improvements in scapular, scapulothoracic and UE control with self care, reaching, carrying, lifting, house/yardwork, driving/computer work.    [] (88294) Provided verbal/tactile cueing for activities related to improving balance, coordination, kinesthetic sense, posture, motor skill, proprioception  to assist with  scapular, scapulothoracic and UE control with self care, reaching, carrying, lifting, house/yardwork, driving/computer work. Therapeutic Activities:    [] (86807 or 70832) Provided verbal/tactile cueing for activities related to improving balance, coordination, kinesthetic sense, posture, motor skill, proprioception and motor activation to allow for proper function of scapular, scapulothoracic and UE control with self care, carrying, lifting, driving/computer work.      Home Exercise Program:    [x] (53136) Reviewed/Progressed HEP activities related to strengthening, flexibility, endurance, ROM of scapular, scapulothoracic and UE control with self care, reaching, carrying, lifting, house/yardwork, driving/computer work  [] (74499) Reviewed/Progressed HEP activities related to improving balance, coordination, kinesthetic sense, posture, motor skill, proprioception of scapular, scapulothoracic and UE control with self care, reaching, carrying, lifting, house/yardwork, driving/computer work      Manual Treatments:  PROM / STM / Oscillations-Mobs:  G-I, II, III, IV (PA's, Inf., Post.)  [x] (66713) 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 [] Progression is slowed due to complexities listed. [] Progression has been slowed due to co-morbidities. [] Plan just implemented, too soon to assess goals progression  [] Other:     ASSESSMENT:  Isidra Martinez did well with increased weight for activities and for endurance activities today.   Treatment/Activity Tolerance:  [x] Patient tolerated treatment well [] Patient limited by fatique  [] Patient limited by pain  [] Patient limited by other medical complications  [] Other:     Prognosis: [x] Good [] Fair  [] Poor    Patient Requires Follow-up: [x] Yes  [] No    PLAN: Additional weight bearing and endurance activities  [x] Continue per plan of care [] Alter current plan (see comments)  [] Plan of care initiated [] Hold pending MD visit [] Discharge    Electronically signed by: Joni Mayorga [id number] [id number]

## 2017-12-13 NOTE — FLOWSHEET NOTE
[facility], [address] [place]  Phone: [phone]  Fax [phone]    ATHLETIC TRAINING [address]  Date:  2017    Patient Name:  Jay Beltran    :    MRN: [id number]  Restrictions/Precautions:    Medical/Treatment Diagnosis Information:  · Diagnosis: R Rotator Cuff Tear -   · Treatment Diagnosis: R Rotator cuff tear - R shoulder stiffness  R shoulder pain Surgery on 17 RCR massive tear   Physician Information:  Dr. Shirlee Bosworth Post-op  2wks    4 wks 6 wks 8 wks   3wks 6 wks 9 wks 12 wks                        Activity Log                                                          DOS/DOI:                                                         Date: 17   ATC Communication:    17 16 wks PO Scapular weakness  VC GH rot w/ RX  Fatigued post RX but shoulder felt good Pt fell on shoulder and has been sick. Pt is tired and slightly sore   Needs endurance for cleaning       6# walk x2'   Plyoback      Chop                            Chest                            ER Flip              Long/Short lever  Flex.                                     Scap.                                    ABd.               punch YTB @ / wall 15x3\" YTB @  wall 15x3\" YTB @  wall 15x3\" YTB @ /2 wall 20x3\"          Body/Cardio Blade Flex/Ext                                       IR/ER                                       ABd/ADd              Push-up      Plus                              Wall                            Table                           Floor          OVL pullbacks 15x    Ball on Wall                   Tramp              Theraband    Rows/Ext GTB/YTB 3x10 ea GTB/YTB 2x10 ea  GTB/YTB 2x10 ea BTB/YTB 3x10 2\"H ea                         IR RTB 3x10  RTB 2x10  RTB 2x10 RTB 3x10                          ER YTB 3x10 YTB 2x10  YTB 2x10 YTB 3x10 No money YTB 2x15 H 3\" YTB 2x15 H 3\" OVL 15x3\" W/ PT                         Horiz. ABd LPD GTB on SB 3x10 LPD GTB on SB 2x10 Lat pulldown GTB on SB 2x10 Lat pulldown BTB on SB 2x10                         Biceps                             Triceps                             Punches    RTB 2x10          Cable Column   Rows                                  Ext.                                   Lat. Pulldown                                  Biceps                                  Triceps              1-on-1 time with PTA   5'    Modality Declined Declined  ES/CP 15' Declined   Initials DB Sa  KRT DB

## 2017-12-20 ENCOUNTER — HOSPITAL ENCOUNTER (OUTPATIENT)
Dept: PHYSICAL THERAPY | Age: 62
Discharge: HOME | End: 2017-12-20
Admitting: ORTHOPAEDIC SURGERY

## 2017-12-20 NOTE — FLOWSHEET NOTE
tissue/joints of cervical/CT, scapular GHJ and UE for the purpose of modulating pain, promoting relaxation,  increasing ROM, reducing/eliminating soft tissue swelling/inflammation/restriction, improving soft tissue extensibility and allowing for proper ROM for normal function with self care, reaching, carrying, lifting, house/yardwork, driving/computer work    Modalities:   PM/CP x 15' R shoulder     Charges:  Timed Code Treatment Minutes: 50   Total Treatment Minutes: 65     [] EVAL (LOW) 11268 (typically 20 minutes face-to-face)  [] EVAL (MOD) 56730 (typically 30 minutes face-to-face)  [] EVAL (HIGH) 19587 (typically 45 minutes face-to-face)  [] RE-EVAL     [x] RP(16407) x  2   [] IONTO  [] NMR (50203) x      [] VASO  [x] Manual (48606) x  1    [] Other:  [] TA x       [] Mech Traction (02701)  [] ES(attended) (95604)      [x] ES (un) (03280):     GOALS  Patient stated goal: To be able to brush her hair and put on make upMET     Therapist goals for Patient:   Short Term Goals: To be achieved in: 2 weeks  1. Independent in HEP and progression per patient tolerance, in order to prevent re-injury. MET  2. Patient will have a decrease in pain to facilitate improvement in movement, function, and ADLs as indicated by Functional Deficits. MET     Long Term Goals: To be achieved in: 8 weeks  1. Disability index score of 30% or less for the [facility] to assist with reaching prior level of function. MET  2. Patient will demonstrate increased AROM to shoulder flex and abd 130, IR: T12, ER: T1 to allow for proper joint functioning as indicated by patients Functional Deficits. -PROGRESSING  3. Patient will demonstrate an increase in Strength to 4/5 in the right arm to allow for proper functional mobility as indicated by patients Functional Deficits. -PROGRESSING  4. Patient will return to self care activities without increased symptoms or restriction. -MET  5.  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

## 2018-01-01 ENCOUNTER — HOSPITAL ENCOUNTER (OUTPATIENT)
Dept: PHYSICAL THERAPY | Age: 63
Discharge: HOME | End: 2018-01-31
Attending: ORTHOPAEDIC SURGERY | Admitting: ORTHOPAEDIC SURGERY

## 2018-01-22 ENCOUNTER — OFFICE VISIT (OUTPATIENT)
Dept: ORTHOPEDIC SURGERY | Age: 63
End: 2018-01-22

## 2018-01-22 VITALS
BODY MASS INDEX: 24.4 KG/M2 | SYSTOLIC BLOOD PRESSURE: 141 MMHG | WEIGHT: 121.03 LBS | DIASTOLIC BLOOD PRESSURE: 77 MMHG | HEART RATE: 48 BPM | HEIGHT: 59 IN | RESPIRATION RATE: 16 BRPM

## 2018-01-22 DIAGNOSIS — M75.121 COMPLETE TEAR OF RIGHT ROTATOR CUFF: Primary | ICD-10-CM

## 2018-01-22 PROCEDURE — 99213 OFFICE O/P EST LOW 20 MIN: CPT | Performed by: ORTHOPAEDIC SURGERY

## 2018-03-15 ENCOUNTER — OFFICE VISIT (OUTPATIENT)
Dept: FAMILY MEDICINE CLINIC | Age: 63
End: 2018-03-15

## 2018-03-15 VITALS
DIASTOLIC BLOOD PRESSURE: 80 MMHG | WEIGHT: 120 LBS | TEMPERATURE: 98.7 F | SYSTOLIC BLOOD PRESSURE: 126 MMHG | BODY MASS INDEX: 24.19 KG/M2

## 2018-03-15 DIAGNOSIS — E78.00 HYPERCHOLESTEROLEMIA: ICD-10-CM

## 2018-03-15 DIAGNOSIS — Z00.00 ROUTINE GENERAL MEDICAL EXAMINATION AT A HEALTH CARE FACILITY: Primary | ICD-10-CM

## 2018-03-15 DIAGNOSIS — J01.20 ACUTE NON-RECURRENT ETHMOIDAL SINUSITIS: Primary | ICD-10-CM

## 2018-03-15 DIAGNOSIS — Z13.1 SCREENING FOR DIABETES MELLITUS: ICD-10-CM

## 2018-03-15 PROCEDURE — 99213 OFFICE O/P EST LOW 20 MIN: CPT | Performed by: FAMILY MEDICINE

## 2018-03-15 RX ORDER — AMOXICILLIN AND CLAVULANATE POTASSIUM 875; 125 MG/1; MG/1
1 TABLET, FILM COATED ORAL 2 TIMES DAILY
Qty: 20 TABLET | Refills: 0 | Status: SHIPPED | OUTPATIENT
Start: 2018-03-15 | End: 2018-03-23 | Stop reason: SDUPTHER

## 2018-03-15 ASSESSMENT — PATIENT HEALTH QUESTIONNAIRE - PHQ9
SUM OF ALL RESPONSES TO PHQ QUESTIONS 1-9: 0
SUM OF ALL RESPONSES TO PHQ9 QUESTIONS 1 & 2: 0
2. FEELING DOWN, DEPRESSED OR HOPELESS: 0
1. LITTLE INTEREST OR PLEASURE IN DOING THINGS: 0

## 2018-03-15 NOTE — PROGRESS NOTES
3/15/2018    Peggyann Dandy is a 60 y.o. female    Chief Complaint   Patient presents with    Sinus Problem     Sinus pressure, congestion x 3 weeks       SUBJECTIVE  HPI       . Peggyann Dandy is here today with 3 weeks of sinus congestion  +pressure in the ethmoid area and beside ehr eyes  Not nauseous -- no diarrhea  She has tried otc meds  Not sob            Review of Systems   Constitutional: Negative for chills, fatigue and fever. HENT: Positive for congestion, postnasal drip, rhinorrhea, sinus pressure and sore throat. Negative for ear pain, mouth sores, nosebleeds and trouble swallowing. Eyes: Negative for discharge and redness. Respiratory: Negative for shortness of breath and wheezing. Cardiovascular: Negative for chest pain. Gastrointestinal: Negative for abdominal pain. Musculoskeletal: Negative for myalgias and neck pain. Skin: Negative for rash. Neurological: Negative for dizziness and syncope. OBJECTIVE  Physical Exam   Constitutional: She is oriented to person, place, and time. She appears well-developed and well-nourished. No distress. HENT:   Head: Normocephalic and atraumatic. Right Ear: No drainage. Left Ear: No drainage. Nose: Mucosal edema and rhinorrhea present. No sinus tenderness. Mouth/Throat: No oropharyngeal exudate, posterior oropharyngeal erythema or tonsillar abscesses. +boggy nasal turbinates     Neck: Normal range of motion. Neck supple. No thyromegaly present.   +shotty cervical adenopathy   Cardiovascular: Normal rate and regular rhythm. Pulmonary/Chest: Effort normal and breath sounds normal. No respiratory distress. She has no wheezes. Musculoskeletal: She exhibits no edema. Neurological: She is alert and oriented to person, place, and time. Skin: Skin is warm and dry. She is not diaphoretic. Psychiatric: She has a normal mood and affect. Her behavior is normal.   Vitals reviewed.       Allergies  Patient has no known allergies. Current Outpatient Prescriptions   Medication Sig Dispense Refill    Calcium Carbonate-Vitamin D (CALTRATE 600+D PO) Take 1 tablet by mouth 2 times daily      atorvastatin (LIPITOR) 40 MG tablet TAKE ONE TABLET BY MOUTH DAILY (Patient taking differently: Take 40 mg by mouth nightly TAKE ONE TABLET BY MOUTH DAILY) 90 tablet 3    citalopram (CELEXA) 40 MG tablet TAKE ONE AND ONE-HALF (1  1/2) TABLETS BY MOUTH DAILY 135 tablet 3    omeprazole (PRILOSEC) 20 MG delayed release capsule Take 1 capsule by mouth daily 90 capsule 3    Biotin 1 MG CAPS Take  by mouth daily.  fish oil-omega-3 fatty acids 1000 MG capsule Take 1 g by mouth 2 times daily       Multiple Vitamins-Minerals (CENTRUM SILVER PO) Take  by mouth daily.  aspirin 81 MG EC tablet Take 81 mg by mouth daily.  amoxicillin-clavulanate (AUGMENTIN) 875-125 MG per tablet Take 1 tablet by mouth 2 times daily for 10 days 20 tablet 0     No current facility-administered medications for this visit. Vitals:    03/15/18 1058   BP: 126/80   Temp:      Body mass index is 24.19 kg/m². ASSESSMENT  1. Acute non-recurrent ethmoidal sinusitis         PLAN  Pt is stable on current meds. Continue with current meds. New meds this visit:    Orders Placed This Encounter   Medications     amoxicillin-clavulanate (AUGMENTIN) 875-125 MG per tablet     Sig: Take 1 tablet by mouth 2 times daily for 10 days     Dispense:  20 tablet     Refill:  0     New orders placed this visit:  No orders of the defined types were placed in this encounter. Return if symptoms worsen or fail to improve.   continue with the following meds:    Outpatient Encounter Prescriptions as of 3/15/2018   Medication Sig Dispense Refill    [DISCONTINUED] amoxicillin-clavulanate (AUGMENTIN) 875-125 MG per tablet Take 1 tablet by mouth 2 times daily for 10 days 20 tablet 0    Calcium Carbonate-Vitamin D (CALTRATE 600+D PO) Take 1 tablet by mouth 2 times daily

## 2018-03-23 ENCOUNTER — TELEPHONE (OUTPATIENT)
Dept: FAMILY MEDICINE CLINIC | Age: 63
End: 2018-03-23

## 2018-03-23 DIAGNOSIS — Z00.00 ROUTINE GENERAL MEDICAL EXAMINATION AT A HEALTH CARE FACILITY: ICD-10-CM

## 2018-03-23 DIAGNOSIS — Z13.1 SCREENING FOR DIABETES MELLITUS: ICD-10-CM

## 2018-03-23 DIAGNOSIS — E78.00 HYPERCHOLESTEROLEMIA: ICD-10-CM

## 2018-03-23 RX ORDER — AMOXICILLIN AND CLAVULANATE POTASSIUM 875; 125 MG/1; MG/1
1 TABLET, FILM COATED ORAL 2 TIMES DAILY
Qty: 20 TABLET | Refills: 0 | Status: SHIPPED | OUTPATIENT
Start: 2018-03-23 | End: 2018-04-02

## 2018-03-25 ASSESSMENT — ENCOUNTER SYMPTOMS
RHINORRHEA: 1
ABDOMINAL PAIN: 0
SORE THROAT: 1
WHEEZING: 0
TROUBLE SWALLOWING: 0
EYE REDNESS: 0
EYE DISCHARGE: 0
SINUS PRESSURE: 1
SHORTNESS OF BREATH: 0

## 2018-04-03 LAB
ALT SERPL-CCNC: 19 U/L (ref 10–40)
ANION GAP SERPL CALCULATED.3IONS-SCNC: 12 MMOL/L (ref 3–16)
AST SERPL-CCNC: 25 U/L (ref 15–37)
BUN BLDV-MCNC: 7 MG/DL (ref 7–20)
CALCIUM SERPL-MCNC: 9.2 MG/DL (ref 8.3–10.6)
CHLORIDE BLD-SCNC: 102 MMOL/L (ref 99–110)
CHOLESTEROL, TOTAL: 134 MG/DL (ref 0–199)
CO2: 29 MMOL/L (ref 21–32)
CREAT SERPL-MCNC: 0.5 MG/DL (ref 0.6–1.2)
GFR AFRICAN AMERICAN: >60
GFR NON-AFRICAN AMERICAN: >60
GLUCOSE BLD-MCNC: 96 MG/DL (ref 70–99)
HDLC SERPL-MCNC: 66 MG/DL (ref 40–60)
LDL CHOLESTEROL CALCULATED: 53 MG/DL
POTASSIUM SERPL-SCNC: 4.5 MMOL/L (ref 3.5–5.1)
SODIUM BLD-SCNC: 143 MMOL/L (ref 136–145)
TRIGL SERPL-MCNC: 75 MG/DL (ref 0–150)
VLDLC SERPL CALC-MCNC: 15 MG/DL

## 2018-04-04 LAB
ESTIMATED AVERAGE GLUCOSE: 102.5 MG/DL
HBA1C MFR BLD: 5.2 %

## 2018-04-05 ENCOUNTER — OFFICE VISIT (OUTPATIENT)
Dept: FAMILY MEDICINE CLINIC | Age: 63
End: 2018-04-05

## 2018-04-05 VITALS
SYSTOLIC BLOOD PRESSURE: 120 MMHG | TEMPERATURE: 97.9 F | HEIGHT: 59 IN | HEART RATE: 60 BPM | BODY MASS INDEX: 23.79 KG/M2 | WEIGHT: 118 LBS | DIASTOLIC BLOOD PRESSURE: 70 MMHG

## 2018-04-05 DIAGNOSIS — E78.00 HYPERCHOLESTEROLEMIA: ICD-10-CM

## 2018-04-05 DIAGNOSIS — Z00.00 PREVENTATIVE HEALTH CARE: Primary | ICD-10-CM

## 2018-04-05 DIAGNOSIS — F41.9 ANXIETY: ICD-10-CM

## 2018-04-05 DIAGNOSIS — K21.9 GASTROESOPHAGEAL REFLUX DISEASE, ESOPHAGITIS PRESENCE NOT SPECIFIED: ICD-10-CM

## 2018-04-05 PROCEDURE — 99396 PREV VISIT EST AGE 40-64: CPT | Performed by: FAMILY MEDICINE

## 2018-04-05 RX ORDER — ATORVASTATIN CALCIUM 40 MG/1
TABLET, FILM COATED ORAL
Qty: 90 TABLET | Refills: 3 | Status: SHIPPED | OUTPATIENT
Start: 2018-04-05 | End: 2019-03-29 | Stop reason: SDUPTHER

## 2018-04-05 RX ORDER — CITALOPRAM 40 MG/1
TABLET ORAL
Qty: 135 TABLET | Refills: 3 | Status: SHIPPED | OUTPATIENT
Start: 2018-04-05 | End: 2018-07-09 | Stop reason: DRUGHIGH

## 2018-04-05 RX ORDER — OMEPRAZOLE 20 MG/1
20 CAPSULE, DELAYED RELEASE ORAL DAILY
Qty: 90 CAPSULE | Refills: 3 | Status: SHIPPED | OUTPATIENT
Start: 2018-04-05 | End: 2019-03-29 | Stop reason: SDUPTHER

## 2018-04-07 ASSESSMENT — ENCOUNTER SYMPTOMS
CHEST TIGHTNESS: 0
VOMITING: 0
EYE REDNESS: 0
WHEEZING: 0
EYE DISCHARGE: 0
NAUSEA: 0
SHORTNESS OF BREATH: 0
TROUBLE SWALLOWING: 0
BLOOD IN STOOL: 0

## 2018-04-23 ENCOUNTER — OFFICE VISIT (OUTPATIENT)
Dept: ORTHOPEDIC SURGERY | Age: 63
End: 2018-04-23

## 2018-04-23 VITALS
BODY MASS INDEX: 23.99 KG/M2 | DIASTOLIC BLOOD PRESSURE: 70 MMHG | SYSTOLIC BLOOD PRESSURE: 121 MMHG | WEIGHT: 119 LBS | RESPIRATION RATE: 14 BRPM | HEIGHT: 59 IN

## 2018-04-23 DIAGNOSIS — M75.121 COMPLETE TEAR OF RIGHT ROTATOR CUFF: Primary | ICD-10-CM

## 2018-04-23 PROCEDURE — 99213 OFFICE O/P EST LOW 20 MIN: CPT | Performed by: ORTHOPAEDIC SURGERY

## 2018-05-09 ENCOUNTER — HOSPITAL ENCOUNTER (OUTPATIENT)
Dept: MAMMOGRAPHY | Age: 63
Discharge: HOME | End: 2018-05-09
Attending: OBSTETRICS & GYNECOLOGY | Admitting: OBSTETRICS & GYNECOLOGY

## 2018-05-09 DIAGNOSIS — Z12.31 ENCOUNTER FOR SCREENING MAMMOGRAM FOR BREAST CANCER: ICD-10-CM

## 2018-07-09 ENCOUNTER — TELEPHONE (OUTPATIENT)
Dept: FAMILY MEDICINE CLINIC | Age: 63
End: 2018-07-09

## 2018-07-09 RX ORDER — CITALOPRAM 40 MG/1
TABLET ORAL
Qty: 45 TABLET | Refills: 3 | Status: SHIPPED | OUTPATIENT
Start: 2018-07-09 | End: 2019-04-15

## 2018-08-27 ENCOUNTER — OFFICE VISIT (OUTPATIENT)
Dept: ORTHOPEDIC SURGERY | Age: 63
End: 2018-08-27

## 2018-08-27 VITALS
HEIGHT: 59 IN | WEIGHT: 119 LBS | DIASTOLIC BLOOD PRESSURE: 81 MMHG | RESPIRATION RATE: 16 BRPM | SYSTOLIC BLOOD PRESSURE: 145 MMHG | BODY MASS INDEX: 23.99 KG/M2

## 2018-08-27 DIAGNOSIS — M75.121 COMPLETE TEAR OF RIGHT ROTATOR CUFF: Primary | ICD-10-CM

## 2018-08-27 PROCEDURE — 99213 OFFICE O/P EST LOW 20 MIN: CPT | Performed by: ORTHOPAEDIC SURGERY

## 2018-08-27 NOTE — PROGRESS NOTES
History:  Rhyschristopher Cannon is here for right shoulder follow up. She had right shoulder arthroscopic surgery on 8/4/17. Findings at surgery: medium L-shaped rotator cuff tear, rupture long head biceps tendon. The patient's pain is rated at 0/10. She is doing HEP daily, but describes doing mostly ER exercises. Physical Examination:  BP (!) 145/81   Resp 16   Ht 4' 11\" (1.499 m)   Wt 119 lb (54 kg)   BMI 24.04 kg/m²     Patient is awake, alert, and in no acute distress. Sensation is intact to light touch in the axillary, median, radial, and ulnar nerve distribution. The EPL, FPL, and interossei are grossly intact. The right upper extremity is warm and well-perfused with brisk capillary refill. Right shoulder active forward flexion 180, passive 180, ER 60 compared to 80-90 on left, IR L4   Right shoulder 5-/5 Supraspinatus, 5/5 External rotation, 5/5 Internal rotation   Minimal scapular dyskinesis after she fatigues with repetitive motion. Assessment:   1 year status post right shoulder arthroscopy, subacromial decompression, and rotator cuff repair. Plan:   Continue HEP to work on strengthening. Specifically discussed supraspinatus exercises. NSAIDs prn.     Follow up in 3-44 months for evaluation of progress or prn if problems

## 2018-11-26 ENCOUNTER — OFFICE VISIT (OUTPATIENT)
Dept: ORTHOPEDIC SURGERY | Age: 63
End: 2018-11-26
Payer: COMMERCIAL

## 2018-11-26 VITALS
HEIGHT: 59 IN | HEART RATE: 55 BPM | WEIGHT: 119 LBS | RESPIRATION RATE: 16 BRPM | BODY MASS INDEX: 23.99 KG/M2 | DIASTOLIC BLOOD PRESSURE: 93 MMHG | SYSTOLIC BLOOD PRESSURE: 169 MMHG

## 2018-11-26 DIAGNOSIS — M75.121 COMPLETE TEAR OF RIGHT ROTATOR CUFF: Primary | ICD-10-CM

## 2018-11-26 PROCEDURE — 99213 OFFICE O/P EST LOW 20 MIN: CPT | Performed by: ORTHOPAEDIC SURGERY

## 2019-04-01 DIAGNOSIS — Z00.00 PREVENTATIVE HEALTH CARE: ICD-10-CM

## 2019-04-01 LAB
A/G RATIO: 1.2 (ref 1.1–2.2)
ALBUMIN SERPL-MCNC: 4.1 G/DL (ref 3.4–5)
ALP BLD-CCNC: 82 U/L (ref 40–129)
ALT SERPL-CCNC: 12 U/L (ref 10–40)
ANION GAP SERPL CALCULATED.3IONS-SCNC: 12 MMOL/L (ref 3–16)
AST SERPL-CCNC: 23 U/L (ref 15–37)
BILIRUB SERPL-MCNC: 0.6 MG/DL (ref 0–1)
BUN BLDV-MCNC: 7 MG/DL (ref 7–20)
CALCIUM SERPL-MCNC: 9.4 MG/DL (ref 8.3–10.6)
CHLORIDE BLD-SCNC: 104 MMOL/L (ref 99–110)
CHOLESTEROL, TOTAL: 132 MG/DL (ref 0–199)
CO2: 27 MMOL/L (ref 21–32)
CREAT SERPL-MCNC: <0.5 MG/DL (ref 0.6–1.2)
GFR AFRICAN AMERICAN: >60
GFR NON-AFRICAN AMERICAN: >60
GLOBULIN: 3.3 G/DL
GLUCOSE BLD-MCNC: 103 MG/DL (ref 70–99)
HDLC SERPL-MCNC: 68 MG/DL (ref 40–60)
LDL CHOLESTEROL CALCULATED: 54 MG/DL
MAGNESIUM: 2.1 MG/DL (ref 1.8–2.4)
POTASSIUM SERPL-SCNC: 4.3 MMOL/L (ref 3.5–5.1)
SODIUM BLD-SCNC: 143 MMOL/L (ref 136–145)
TOTAL PROTEIN: 7.4 G/DL (ref 6.4–8.2)
TRIGL SERPL-MCNC: 49 MG/DL (ref 0–150)
VITAMIN B-12: 392 PG/ML (ref 211–911)
VLDLC SERPL CALC-MCNC: 10 MG/DL

## 2019-04-02 RX ORDER — ATORVASTATIN CALCIUM 40 MG/1
TABLET, FILM COATED ORAL
Qty: 30 TABLET | Refills: 2 | Status: SHIPPED | OUTPATIENT
Start: 2019-04-02 | End: 2019-04-15 | Stop reason: SDUPTHER

## 2019-04-02 RX ORDER — OMEPRAZOLE 20 MG/1
CAPSULE, DELAYED RELEASE ORAL
Qty: 30 CAPSULE | Refills: 2 | Status: SHIPPED | OUTPATIENT
Start: 2019-04-02 | End: 2019-04-15 | Stop reason: SDUPTHER

## 2019-04-15 ENCOUNTER — OFFICE VISIT (OUTPATIENT)
Dept: FAMILY MEDICINE CLINIC | Age: 64
End: 2019-04-15
Payer: COMMERCIAL

## 2019-04-15 VITALS
HEIGHT: 61 IN | DIASTOLIC BLOOD PRESSURE: 44 MMHG | WEIGHT: 121 LBS | SYSTOLIC BLOOD PRESSURE: 98 MMHG | TEMPERATURE: 97.7 F | BODY MASS INDEX: 22.84 KG/M2

## 2019-04-15 DIAGNOSIS — Z00.00 ROUTINE GENERAL MEDICAL EXAMINATION AT A HEALTH CARE FACILITY: Primary | ICD-10-CM

## 2019-04-15 DIAGNOSIS — E78.00 HYPERCHOLESTEROLEMIA: ICD-10-CM

## 2019-04-15 DIAGNOSIS — K21.9 GASTROESOPHAGEAL REFLUX DISEASE, ESOPHAGITIS PRESENCE NOT SPECIFIED: ICD-10-CM

## 2019-04-15 DIAGNOSIS — F41.9 ANXIETY: ICD-10-CM

## 2019-04-15 DIAGNOSIS — R73.9 HYPERGLYCEMIA: ICD-10-CM

## 2019-04-15 PROCEDURE — 99396 PREV VISIT EST AGE 40-64: CPT | Performed by: FAMILY MEDICINE

## 2019-04-15 RX ORDER — ATORVASTATIN CALCIUM 40 MG/1
TABLET, FILM COATED ORAL
Qty: 90 TABLET | Refills: 3 | Status: SHIPPED | OUTPATIENT
Start: 2019-04-15 | End: 2020-02-28 | Stop reason: SDUPTHER

## 2019-04-15 RX ORDER — CITALOPRAM 20 MG/1
20 TABLET ORAL DAILY
Qty: 90 TABLET | Refills: 3 | Status: SHIPPED | OUTPATIENT
Start: 2019-04-15 | End: 2020-05-15 | Stop reason: SDUPTHER

## 2019-04-15 RX ORDER — OMEPRAZOLE 20 MG/1
CAPSULE, DELAYED RELEASE ORAL
Qty: 90 CAPSULE | Refills: 3 | Status: SHIPPED | OUTPATIENT
Start: 2019-04-15 | End: 2020-04-27 | Stop reason: SDUPTHER

## 2019-04-15 ASSESSMENT — ENCOUNTER SYMPTOMS
NAUSEA: 0
BLOOD IN STOOL: 0
EYE DISCHARGE: 0
CHEST TIGHTNESS: 0
SHORTNESS OF BREATH: 0
EYE REDNESS: 0
VOMITING: 0
TROUBLE SWALLOWING: 0
WHEEZING: 0

## 2019-04-15 NOTE — PROGRESS NOTES
4/15/2019    Graciela Glez is a 58 y.o. female    Chief Complaint   Patient presents with    Annual Exam     cpe no new issues       SUBJECTIVE  HPI Graciela Glez is a 58 y.o. female presenting today with a chief complaint of needing her yearly physical exam. Fasting labs done on April 1, 2019. Last mammogram done May 5, 2018. Next due in 1 year. Last colonoscopy done March 17, 2014. Next due in 5 years. Last tetanus vaccine March 4, 2016. Last eye checkup 1.5 years ago    Last dental check 15 years ago. She does floss with every meal         Review of Systems   Constitutional: Negative for fever. HENT: Negative for mouth sores, nosebleeds and trouble swallowing. Eyes: Negative for discharge and redness. Respiratory: Negative for chest tightness, shortness of breath and wheezing. Cardiovascular: Negative for chest pain and leg swelling. Gastrointestinal: Negative for blood in stool, nausea and vomiting. Genitourinary: Negative for dysuria, hematuria and urgency. Musculoskeletal: Negative for joint swelling. Skin: Negative for rash. Neurological: Negative for light-headedness.        Past Medical History:   Diagnosis Date    Acid reflux     Anxiety     Depression     Hyperlipidemia     controlled with meds    Ulcer, esophagus      Past Surgical History:   Procedure Laterality Date    BREAST LUMPECTOMY      RIGHT BREAST X 3 (BENIGN)    BUNIONECTOMY  2008    FOOT SURGERY      SCREWS PLACED IN RIGHT FOOT    SHOULDER ARTHROSCOPY Right 08/04/2017    TUBAL LIGATION  1985     Family History   Problem Relation Age of Onset    Diabetes Mother     Hypertension Mother     Heart Disease Mother     Stroke Father     Hypertension Father     Heart Disease Father         mi    Cancer Sister 62        BREAST    Heart Disease Brother     Diabetes Brother      Social History     Tobacco Use   Smoking Status Former Smoker    Packs/day: 1.00    Years: 10.00    Pack years: 10.00    Last attempt to quit: 1998    Years since quittin.6   Smokeless Tobacco Never Used      Patient Active Problem List   Diagnosis    Ulcer, esophagus    Depression    Anxiety    GERD (gastroesophageal reflux disease)    Dysphagia    Hypercholesterolemia    Complete tear of right rotator cuff       OBJECTIVE  Physical Exam   Constitutional: She is oriented to person, place, and time. She appears well-developed and well-nourished. HENT:   Head: Normocephalic and atraumatic. Neck: Normal range of motion. Neck supple. No thyromegaly present. Cardiovascular: Normal rate, regular rhythm and normal heart sounds. Pulmonary/Chest: Effort normal and breath sounds normal. She has no wheezes. Abdominal: Soft. Bowel sounds are normal. She exhibits no mass. Musculoskeletal: Normal range of motion. She exhibits no edema or tenderness. Lymphadenopathy:     She has no cervical adenopathy. Neurological: She is alert and oriented to person, place, and time. Skin: Skin is warm and dry. No rash noted. No erythema. No pallor. Psychiatric: She has a normal mood and affect. Her behavior is normal. Judgment and thought content normal.   Vitals reviewed. allergies  Patient has no known allergies. Current Outpatient Medications   Medication Sig Dispense Refill    atorvastatin (LIPITOR) 40 MG tablet 1 po q hs 90 tablet 3    omeprazole (PRILOSEC) 20 MG delayed release capsule TAKE ONE CAPSULE BY MOUTH DAILY 90 capsule 3    citalopram (CELEXA) 20 MG tablet Take 1 tablet by mouth daily 90 tablet 3    Calcium Carbonate-Vitamin D (CALTRATE 600+D PO) Take 1 tablet by mouth 2 times daily      Biotin 1 MG CAPS Take  by mouth daily.  fish oil-omega-3 fatty acids 1000 MG capsule Take 1 g by mouth 2 times daily       Multiple Vitamins-Minerals (CENTRUM SILVER PO) Take  by mouth daily.  aspirin 81 MG EC tablet Take 81 mg by mouth daily.          No current facility-administered medications for this visit. Vitals:    04/15/19 0916   BP: (!) 98/44   Temp: 97.7 °F (36.5 °C)     Body mass index is 22.86 kg/m². Immunization History   Administered Date(s) Administered    Influenza Vaccine, unspecified formulation 10/31/2017    Influenza Virus Vaccine 11/01/2014, 10/01/2015, 10/28/2016    Influenza Whole 11/01/2012    Pneumococcal Polysaccharide ([id number]) 03/04/2016    Tdap (Boostrix, Adacel) 06/26/2007, 03/04/2016    Zoster Live (Zostavax) 04/23/2015     Lab Review   Orders Only on 04/01/2019   Component Date Value    Magnesium 04/01/2019 2.10     Vitamin B-12 04/01/2019 392     Cholesterol, Total 04/01/2019 132     Triglycerides 04/01/2019 49     HDL 04/01/2019 68*    LDL Calculated 04/01/2019 54     VLDL Cholesterol Calcula* 04/01/2019 10     Sodium 04/01/2019 143     Potassium 04/01/2019 4.3     Chloride 04/01/2019 104     CO2 04/01/2019 27     Anion Gap 04/01/2019 12     Glucose 04/01/2019 103*    BUN 04/01/2019 7     CREATININE 04/01/2019 <0.5*    GFR Non- 04/01/2019 >60     GFR  04/01/2019 >60     Calcium 04/01/2019 9.4     Total Protein 04/01/2019 7.4     Alb 04/01/2019 4.1     Albumin/Globulin Ratio 04/01/2019 1.2     Total Bilirubin 04/01/2019 0.6     Alkaline Phosphatase 04/01/2019 82     ALT 04/01/2019 12     AST 04/01/2019 23     Globulin 04/01/2019 3.3      The 10-year ASCVD risk score (Mitali Self, et al., 2013) is: 2.1%    Values used to calculate the score:      Age: 58 years      Sex: Female      Is Non- : No      Diabetic: No      Tobacco smoker: No      Systolic Blood Pressure: 98 mmHg      Is BP treated: No      HDL Cholesterol: 68 mg/dL      Total Cholesterol: 132 mg/dL    ASSESSMENT AND PLAN     Diagnosis Orders   1. Routine general medical examination at a health care facility     2. Hypercholesterolemia  Lipid Panel   3. Anxiety     4.  Gastroesophageal reflux disease, esophagitis presence not specified  Comprehensive Metabolic Panel    Vitamin B12    Magnesium   5. Hyperglycemia  Comprehensive Metabolic Panel    Hemoglobin A1C     Refill meds. done  Information sheet given on the shingles vaccine  Lab orders printed for next year  Continue with exercise for heart health. Continue with weight maintenance. She will  Monitor home bp's and if over 130/80 she will let me know. Stable on current meds.   Continue with currentmeds  New meds this visit:    Orders Placed This Encounter   Medications    atorvastatin (LIPITOR) 40 MG tablet     Si po q hs     Dispense:  90 tablet     Refill:  3    omeprazole (PRILOSEC) 20 MG delayed release capsule     Sig: TAKE ONE CAPSULE BY MOUTH DAILY     Dispense:  90 capsule     Refill:  3    citalopram (CELEXA) 20 MG tablet     Sig: Take 1 tablet by mouth daily     Dispense:  90 tablet     Refill:  3     New orders placed this visit:    Orders Placed This Encounter   Procedures    Comprehensive Metabolic Panel     Standing Status:   Future     Standing Expiration Date:   8/15/2020    Vitamin B12     Standing Status:   Future     Standing Expiration Date:   8/15/2020    Magnesium     Standing Status:   Future     Standing Expiration Date:   8/15/2020    Lipid Panel     Standing Status:   Future     Standing Expiration Date:   8/15/2020     Order Specific Question:   Is Patient Fasting?/# of Hours     Answer:   12 hours    Hemoglobin A1C     Standing Status:   Future     Standing Expiration Date:   8/15/2020     Return in about 1 year (around 4/15/2020) for 30 minute physical exam.  continuewith the following meds:    Outpatient Encounter Medications as of 4/15/2019   Medication Sig Dispense Refill    atorvastatin (LIPITOR) 40 MG tablet 1 po q hs 90 tablet 3    omeprazole (PRILOSEC) 20 MG delayed release capsule TAKE ONE CAPSULE BY MOUTH DAILY 90 capsule 3    citalopram (CELEXA) 20 MG tablet Take 1 tablet by mouth daily 90 tablet 3    Calcium Carbonate-Vitamin D (CALTRATE 600+D PO) Take 1 tablet by mouth 2 times daily      Biotin 1 MG CAPS Take  by mouth daily.  fish oil-omega-3 fatty acids 1000 MG capsule Take 1 g by mouth 2 times daily       Multiple Vitamins-Minerals (CENTRUM SILVER PO) Take  by mouth daily.  aspirin 81 MG EC tablet Take 81 mg by mouth daily.  [DISCONTINUED] atorvastatin (LIPITOR) 40 MG tablet TAKE ONE TABLET BY MOUTH DAILY 30 tablet 2    [DISCONTINUED] omeprazole (PRILOSEC) 20 MG delayed release capsule TAKE ONE CAPSULE BY MOUTH DAILY 30 capsule 2    [DISCONTINUED] citalopram (CELEXA) 40 MG tablet TAKE ONE-HALF (1/2) TABLET BY MOUTH DAILY 45 tablet 3     No facility-administered encounter medications on file as of 4/15/2019.           Barb Edmond D.O.

## 2019-04-15 NOTE — PATIENT INSTRUCTIONS
Patient Education        Recombinant Zoster (Shingles) Vaccine, RZV: What you need to know  Why get vaccinated? Shingles (also called herpes zoster, or just zoster) is a painful skin rash, often with blisters. Shingles is caused by the varicella zoster virus, the same virus that causes chickenpox. After you have chickenpox, the virus stays in your body and can cause shingles later in life. You can't catch shingles from another person. However, a person who has never had chickenpox (or chickenpox vaccine) could get chickenpox from someone with shingles. A shingles rash usually appears on one side of the face or body and heals within 2 to 4 weeks. Its main symptom is pain, which can be severe. Other symptoms can include fever, headache, chills, and upset stomach. Very rarely, a shingles infection can lead to pneumonia, hearing problems, blindness, brain inflammation (encephalitis), or death. For about 1 person in 5, severe pain can continue even long after the rash has cleared up. This long-lasting pain is called post-herpetic neuralgia (PHN). Shingles is far more common in people 48years of age and older than in younger people, and the risk increases with age. It is also more common in people whose immune system is weakened because of a disease such as cancer, or by drugs such as steroids or chemotherapy. At least 1 million people a year in the [facility] get shingles. Shingles vaccine (recombinant)  Recombinant shingles vaccine was approved by FDA in 2017 for the prevention of shingles. In clinical trials, it was more than 90% effective in preventing shingles. It can also reduce the likelihood of PHN. Two doses, 2 to 6 months apart, are recommended for adults 48 and older. This vaccine is also recommended for people who have already gotten the live shingles vaccine (Zostavax). There is no live virus in this vaccine.   Some people should not get this vaccine  Tell your vaccine provider if you:  · Have any severe, life-threatening allergies. A person who has ever had a life-threatening allergic reaction after a dose of recombinant shingles vaccine, or has a severe allergy to any component of this vaccine, may be advised not to be vaccinated. Ask your health care provider if you want information about vaccine components. · Are pregnant or breastfeeding. There is not much information about use of recombinant shingles vaccine in pregnant or nursing women. Your healthcare provider might recommend delaying vaccination. · Are not feeling well. If you have a mild illness, such as a cold, you can probably get the vaccine today. If you are moderately or severely ill, you should probably wait until you recover. Your doctor can advise you. Risks of a vaccine reaction  With any medicine, including vaccines, there is a chance of reactions. After recombinant shingles vaccination, a person might experience:  · Pain, redness, soreness, or swelling at the site of the injection  · Headache, muscle aches, fever, shivering, fatigue  In clinical trials, most people got a sore arm with mild or moderate pain after vaccination, and some also had redness and swelling where they got the shot. Some people felt tired, had muscle pain, a headache, shivering, fever, stomach pain, or nausea. About 1 out of 6 people who got recombinant zoster vaccine experienced side effects that prevented them from doing regular activities. Symptoms went away on their own in about 2 to 3 days. Side effects were more common in younger people. You should still get the second dose of recombinant zoster vaccine even if you had one of these reactions after the first dose. Other things that could happen after this vaccine:  · People sometimes faint after medical procedures, including vaccination. Sitting or lying down for about 15 minutes can help prevent fainting and injuries caused by a fall.  Tell your provider if you feel dizzy or have vision changes or ringing in the ears. · Some people get shoulder pain that can be more severe and longer-lasting than routine soreness that can follow injections. This happens very rarely. · Any medication can cause a severe allergic reaction. Such reactions to a vaccine are estimated at about 1 in a million doses, and would happen within a few minutes to a few hours after the vaccination. As with any medicine, there is a very remote chance of a vaccine causing a serious injury or death. The safety of vaccines is always being monitored. For more information, visit: www.cdc.gov/vaccinesafety/  What if there is a serious problem? What should I look for? · Look for anything that concerns you, such as signs of a severe allergic reaction, very high fever, or unusual behavior. Signs of a severe allergic reaction can include hives, swelling of the face and throat, difficulty breathing, a fast heartbeat, dizziness, and weakness. These would usually start a few minutes to a few hours after the vaccination. What should I do? · If you think it is a severe allergic reaction or other emergency that can't wait, call 9-1-1 or get to the nearest hospital. Otherwise, call your health care provider. · Afterward, the reaction should be reported to the Vaccine Adverse Event Reporting System (VAERS). Your doctor should file this report, or you can do it yourself through the VAERS website at www.vaers. [facility].gov, or by calling 4-244.482.8593. VAERS does not give medical advice. .  How can I learn more? · Ask your health care provider. He or she can give you the vaccine package insert or suggest other sources of information. · Call your local or state health department. · Contact the Centers for Disease Control and Prevention (CDC):  ? Call 8-188.758.4619 (1-800-CDC-INFO) or  ?  Visit CDC's website at www.cdc.gov/vaccines  Vaccine Information Statement (Interim)  Recombinant Zoster Vaccine  2/12/2018  Department of Health and Human Services  Centers for Disease Control and Prevention  Many Vaccine Information Statements are available in [place] and other languages. See www.immunize.org/vis. Hojas de Información Sobre Vacunas están disponibles en Español y en muchos otros idiomas. Visite [email or web address].si   Care instructions adapted under license by [facility] ([facility]). If you have questions about a medical condition or this instruction, always ask your healthcare professional. [place] [address] any warranty or liability for your use of this information.        check your home bp's at various times and let me know if the average bp is > 130/80

## 2019-10-24 ENCOUNTER — HOSPITAL ENCOUNTER (OUTPATIENT)
Dept: WOMENS IMAGING | Age: 64
Discharge: HOME | End: 2019-10-24
Payer: MEDICARE

## 2019-10-24 DIAGNOSIS — Z12.31 VISIT FOR SCREENING MAMMOGRAM: ICD-10-CM

## 2019-10-24 PROCEDURE — 77063 BREAST TOMOSYNTHESIS BI: CPT

## 2019-12-13 ENCOUNTER — OFFICE VISIT (OUTPATIENT)
Dept: FAMILY MEDICINE CLINIC | Age: 64
End: 2019-12-13
Payer: MEDICARE

## 2019-12-13 VITALS
BODY MASS INDEX: 23.22 KG/M2 | DIASTOLIC BLOOD PRESSURE: 60 MMHG | HEIGHT: 61 IN | WEIGHT: 123 LBS | SYSTOLIC BLOOD PRESSURE: 124 MMHG | TEMPERATURE: 97.8 F

## 2019-12-13 PROCEDURE — 1123F ACP DISCUSS/DSCN MKR DOCD: CPT | Performed by: FAMILY MEDICINE

## 2019-12-13 PROCEDURE — 1036F TOBACCO NON-USER: CPT | Performed by: FAMILY MEDICINE

## 2019-12-13 PROCEDURE — 3017F COLORECTAL CA SCREEN DOC REV: CPT | Performed by: FAMILY MEDICINE

## 2019-12-13 PROCEDURE — 4040F PNEUMOC VAC/ADMIN/RCVD: CPT | Performed by: FAMILY MEDICINE

## 2019-12-13 PROCEDURE — G8420 CALC BMI NORM PARAMETERS: HCPCS | Performed by: FAMILY MEDICINE

## 2019-12-13 PROCEDURE — G8484 FLU IMMUNIZE NO ADMIN: HCPCS | Performed by: FAMILY MEDICINE

## 2019-12-13 PROCEDURE — G8399 PT W/DXA RESULTS DOCUMENT: HCPCS | Performed by: FAMILY MEDICINE

## 2019-12-13 PROCEDURE — G8427 DOCREV CUR MEDS BY ELIG CLIN: HCPCS | Performed by: FAMILY MEDICINE

## 2019-12-13 PROCEDURE — 1090F PRES/ABSN URINE INCON ASSESS: CPT | Performed by: FAMILY MEDICINE

## 2019-12-13 PROCEDURE — 99213 OFFICE O/P EST LOW 20 MIN: CPT | Performed by: FAMILY MEDICINE

## 2019-12-17 ENCOUNTER — INITIAL CONSULT (OUTPATIENT)
Dept: SURGERY | Age: 64
End: 2019-12-17
Payer: MEDICARE

## 2019-12-17 VITALS
DIASTOLIC BLOOD PRESSURE: 80 MMHG | SYSTOLIC BLOOD PRESSURE: 134 MMHG | HEIGHT: 61 IN | WEIGHT: 123 LBS | BODY MASS INDEX: 23.22 KG/M2

## 2019-12-17 DIAGNOSIS — K40.90 LEFT INGUINAL HERNIA: Primary | ICD-10-CM

## 2019-12-17 PROCEDURE — G8427 DOCREV CUR MEDS BY ELIG CLIN: HCPCS | Performed by: SURGERY

## 2019-12-17 PROCEDURE — 1123F ACP DISCUSS/DSCN MKR DOCD: CPT | Performed by: SURGERY

## 2019-12-17 PROCEDURE — 1036F TOBACCO NON-USER: CPT | Performed by: SURGERY

## 2019-12-17 PROCEDURE — G8399 PT W/DXA RESULTS DOCUMENT: HCPCS | Performed by: SURGERY

## 2019-12-17 PROCEDURE — 1090F PRES/ABSN URINE INCON ASSESS: CPT | Performed by: SURGERY

## 2019-12-17 PROCEDURE — 4040F PNEUMOC VAC/ADMIN/RCVD: CPT | Performed by: SURGERY

## 2019-12-17 PROCEDURE — 3017F COLORECTAL CA SCREEN DOC REV: CPT | Performed by: SURGERY

## 2019-12-17 PROCEDURE — G8484 FLU IMMUNIZE NO ADMIN: HCPCS | Performed by: SURGERY

## 2019-12-17 PROCEDURE — 99203 OFFICE O/P NEW LOW 30 MIN: CPT | Performed by: SURGERY

## 2019-12-17 PROCEDURE — G8420 CALC BMI NORM PARAMETERS: HCPCS | Performed by: SURGERY

## 2019-12-17 ASSESSMENT — ENCOUNTER SYMPTOMS
ABDOMINAL PAIN: 1
ABDOMINAL DISTENTION: 1
NAUSEA: 1

## 2019-12-19 ENCOUNTER — ANESTHESIA EVENT (OUTPATIENT)
Dept: OPERATING ROOM | Age: 64
End: 2019-12-19
Payer: MEDICARE

## 2019-12-20 ENCOUNTER — HOSPITAL ENCOUNTER (OUTPATIENT)
Age: 64
Setting detail: OUTPATIENT SURGERY
Discharge: HOME | End: 2019-12-20
Attending: SURGERY | Admitting: SURGERY
Payer: MEDICARE

## 2019-12-20 ENCOUNTER — ANESTHESIA (OUTPATIENT)
Dept: OPERATING ROOM | Age: 64
End: 2019-12-20
Payer: MEDICARE

## 2019-12-20 VITALS
DIASTOLIC BLOOD PRESSURE: 71 MMHG | SYSTOLIC BLOOD PRESSURE: 135 MMHG | OXYGEN SATURATION: 98 % | RESPIRATION RATE: 12 BRPM

## 2019-12-20 VITALS
DIASTOLIC BLOOD PRESSURE: 72 MMHG | SYSTOLIC BLOOD PRESSURE: 129 MMHG | BODY MASS INDEX: 23.02 KG/M2 | TEMPERATURE: 97 F | RESPIRATION RATE: 16 BRPM | HEIGHT: 61 IN | HEART RATE: 60 BPM | WEIGHT: 121.91 LBS | OXYGEN SATURATION: 98 %

## 2019-12-20 DIAGNOSIS — K40.90 LEFT INGUINAL HERNIA: Primary | ICD-10-CM

## 2019-12-20 PROCEDURE — 3700000001 HC ADD 15 MINUTES (ANESTHESIA): Performed by: SURGERY

## 2019-12-20 PROCEDURE — 7100000010 HC PHASE II RECOVERY - FIRST 15 MIN: Performed by: SURGERY

## 2019-12-20 PROCEDURE — 2580000003 HC RX 258: Performed by: SURGERY

## 2019-12-20 PROCEDURE — 2500000003 HC RX 250 WO HCPCS: Performed by: SURGERY

## 2019-12-20 PROCEDURE — C2626 INFUSION PUMP, NON-PROG,TEMP: HCPCS | Performed by: SURGERY

## 2019-12-20 PROCEDURE — 3600000003 HC SURGERY LEVEL 3 BASE: Performed by: SURGERY

## 2019-12-20 PROCEDURE — 6360000002 HC RX W HCPCS

## 2019-12-20 PROCEDURE — 49505 PRP I/HERN INIT REDUC >5 YR: CPT | Performed by: SURGERY

## 2019-12-20 PROCEDURE — 2709999900 HC NON-CHARGEABLE SUPPLY: Performed by: SURGERY

## 2019-12-20 PROCEDURE — 3700000000 HC ANESTHESIA ATTENDED CARE: Performed by: SURGERY

## 2019-12-20 PROCEDURE — 2500000003 HC RX 250 WO HCPCS

## 2019-12-20 PROCEDURE — 3600000013 HC SURGERY LEVEL 3 ADDTL 15MIN: Performed by: SURGERY

## 2019-12-20 PROCEDURE — 7100000011 HC PHASE II RECOVERY - ADDTL 15 MIN: Performed by: SURGERY

## 2019-12-20 PROCEDURE — 7100000001 HC PACU RECOVERY - ADDTL 15 MIN: Performed by: SURGERY

## 2019-12-20 PROCEDURE — 2580000003 HC RX 258: Performed by: ANESTHESIOLOGY

## 2019-12-20 PROCEDURE — C1781 MESH (IMPLANTABLE): HCPCS | Performed by: SURGERY

## 2019-12-20 PROCEDURE — 7100000000 HC PACU RECOVERY - FIRST 15 MIN: Performed by: SURGERY

## 2019-12-20 DEVICE — MESH HERN W3XL6IN INGUINAL POLYPR MFIL RECTANG: Type: IMPLANTABLE DEVICE | Site: GROIN | Status: FUNCTIONAL

## 2019-12-20 RX ORDER — MIDAZOLAM HYDROCHLORIDE 1 MG/ML
INJECTION INTRAMUSCULAR; INTRAVENOUS PRN
Status: DISCONTINUED | OUTPATIENT
Start: 2019-12-20 | End: 2019-12-20 | Stop reason: SDUPTHER

## 2019-12-20 RX ORDER — HYDRALAZINE HYDROCHLORIDE 20 MG/ML
5 INJECTION INTRAMUSCULAR; INTRAVENOUS EVERY 10 MIN PRN
Status: DISCONTINUED | OUTPATIENT
Start: 2019-12-20 | End: 2019-12-20 | Stop reason: HOSPADM

## 2019-12-20 RX ORDER — FENTANYL CITRATE 50 UG/ML
25 INJECTION, SOLUTION INTRAMUSCULAR; INTRAVENOUS EVERY 5 MIN PRN
Status: DISCONTINUED | OUTPATIENT
Start: 2019-12-20 | End: 2019-12-20 | Stop reason: HOSPADM

## 2019-12-20 RX ORDER — SODIUM CHLORIDE 9 MG/ML
INJECTION, SOLUTION INTRAVENOUS CONTINUOUS
Status: DISCONTINUED | OUTPATIENT
Start: 2019-12-20 | End: 2019-12-20 | Stop reason: HOSPADM

## 2019-12-20 RX ORDER — PROPOFOL 10 MG/ML
INJECTION, EMULSION INTRAVENOUS PRN
Status: DISCONTINUED | OUTPATIENT
Start: 2019-12-20 | End: 2019-12-20 | Stop reason: SDUPTHER

## 2019-12-20 RX ORDER — FENTANYL CITRATE 50 UG/ML
INJECTION, SOLUTION INTRAMUSCULAR; INTRAVENOUS PRN
Status: DISCONTINUED | OUTPATIENT
Start: 2019-12-20 | End: 2019-12-20 | Stop reason: SDUPTHER

## 2019-12-20 RX ORDER — HYDROCODONE BITARTRATE AND ACETAMINOPHEN 5; 325 MG/1; MG/1
2 TABLET ORAL PRN
Status: DISCONTINUED | OUTPATIENT
Start: 2019-12-20 | End: 2019-12-20 | Stop reason: HOSPADM

## 2019-12-20 RX ORDER — HYDROCODONE BITARTRATE AND ACETAMINOPHEN 5; 325 MG/1; MG/1
1 TABLET ORAL PRN
Status: DISCONTINUED | OUTPATIENT
Start: 2019-12-20 | End: 2019-12-20 | Stop reason: HOSPADM

## 2019-12-20 RX ORDER — CEFAZOLIN SODIUM 1 G/3ML
INJECTION, POWDER, FOR SOLUTION INTRAMUSCULAR; INTRAVENOUS PRN
Status: DISCONTINUED | OUTPATIENT
Start: 2019-12-20 | End: 2019-12-20 | Stop reason: SDUPTHER

## 2019-12-20 RX ORDER — PROPOFOL 10 MG/ML
INJECTION, EMULSION INTRAVENOUS CONTINUOUS PRN
Status: DISCONTINUED | OUTPATIENT
Start: 2019-12-20 | End: 2019-12-20 | Stop reason: SDUPTHER

## 2019-12-20 RX ORDER — MAGNESIUM HYDROXIDE 1200 MG/15ML
LIQUID ORAL CONTINUOUS PRN
Status: COMPLETED | OUTPATIENT
Start: 2019-12-20 | End: 2019-12-20

## 2019-12-20 RX ORDER — SODIUM CHLORIDE 0.9 % (FLUSH) 0.9 %
10 SYRINGE (ML) INJECTION PRN
Status: DISCONTINUED | OUTPATIENT
Start: 2019-12-20 | End: 2019-12-20 | Stop reason: HOSPADM

## 2019-12-20 RX ORDER — DEXAMETHASONE SODIUM PHOSPHATE 4 MG/ML
INJECTION, SOLUTION INTRA-ARTICULAR; INTRALESIONAL; INTRAMUSCULAR; INTRAVENOUS; SOFT TISSUE PRN
Status: DISCONTINUED | OUTPATIENT
Start: 2019-12-20 | End: 2019-12-20 | Stop reason: SDUPTHER

## 2019-12-20 RX ORDER — ONDANSETRON 2 MG/ML
INJECTION INTRAMUSCULAR; INTRAVENOUS PRN
Status: DISCONTINUED | OUTPATIENT
Start: 2019-12-20 | End: 2019-12-20 | Stop reason: SDUPTHER

## 2019-12-20 RX ORDER — FENTANYL CITRATE 50 UG/ML
50 INJECTION, SOLUTION INTRAMUSCULAR; INTRAVENOUS EVERY 5 MIN PRN
Status: DISCONTINUED | OUTPATIENT
Start: 2019-12-20 | End: 2019-12-20 | Stop reason: HOSPADM

## 2019-12-20 RX ORDER — SODIUM CHLORIDE 0.9 % (FLUSH) 0.9 %
10 SYRINGE (ML) INJECTION EVERY 12 HOURS SCHEDULED
Status: DISCONTINUED | OUTPATIENT
Start: 2019-12-20 | End: 2019-12-20 | Stop reason: HOSPADM

## 2019-12-20 RX ORDER — ONDANSETRON 2 MG/ML
4 INJECTION INTRAMUSCULAR; INTRAVENOUS
Status: DISCONTINUED | OUTPATIENT
Start: 2019-12-20 | End: 2019-12-20 | Stop reason: HOSPADM

## 2019-12-20 RX ORDER — MEPERIDINE HYDROCHLORIDE 25 MG/ML
12.5 INJECTION INTRAMUSCULAR; INTRAVENOUS; SUBCUTANEOUS EVERY 5 MIN PRN
Status: DISCONTINUED | OUTPATIENT
Start: 2019-12-20 | End: 2019-12-20 | Stop reason: HOSPADM

## 2019-12-20 RX ORDER — OXYCODONE HYDROCHLORIDE 5 MG/1
5 TABLET ORAL EVERY 6 HOURS PRN
Qty: 20 TABLET | Refills: 0 | Status: SHIPPED | OUTPATIENT
Start: 2019-12-20 | End: 2019-12-25

## 2019-12-20 RX ORDER — PROMETHAZINE HYDROCHLORIDE 25 MG/ML
6.25 INJECTION, SOLUTION INTRAMUSCULAR; INTRAVENOUS
Status: DISCONTINUED | OUTPATIENT
Start: 2019-12-20 | End: 2019-12-20 | Stop reason: HOSPADM

## 2019-12-20 RX ORDER — EPHEDRINE SULFATE/0.9% NACL/PF 50 MG/5 ML
SYRINGE (ML) INTRAVENOUS PRN
Status: DISCONTINUED | OUTPATIENT
Start: 2019-12-20 | End: 2019-12-20 | Stop reason: SDUPTHER

## 2019-12-20 RX ADMIN — ONDANSETRON 4 MG: 2 INJECTION INTRAMUSCULAR; INTRAVENOUS at 11:20

## 2019-12-20 RX ADMIN — FENTANYL CITRATE 50 MCG: 50 INJECTION INTRAMUSCULAR; INTRAVENOUS at 11:15

## 2019-12-20 RX ADMIN — MIDAZOLAM 2 MG: 1 INJECTION INTRAMUSCULAR; INTRAVENOUS at 11:00

## 2019-12-20 RX ADMIN — Medication 20 MG: at 11:50

## 2019-12-20 RX ADMIN — PROPOFOL 50 MG: 10 INJECTION, EMULSION INTRAVENOUS at 11:15

## 2019-12-20 RX ADMIN — SODIUM CHLORIDE: 9 INJECTION, SOLUTION INTRAVENOUS at 10:09

## 2019-12-20 RX ADMIN — Medication 20 MG: at 11:46

## 2019-12-20 RX ADMIN — CEFAZOLIN SODIUM 2000 MG: 1 INJECTION, POWDER, FOR SOLUTION INTRAMUSCULAR; INTRAVENOUS at 11:00

## 2019-12-20 RX ADMIN — FENTANYL CITRATE 50 MCG: 50 INJECTION INTRAMUSCULAR; INTRAVENOUS at 11:00

## 2019-12-20 RX ADMIN — DEXAMETHASONE SODIUM PHOSPHATE 8 MG: 4 INJECTION, SOLUTION INTRAMUSCULAR; INTRAVENOUS at 11:20

## 2019-12-20 RX ADMIN — PROPOFOL 140 MCG/KG/MIN: 10 INJECTION, EMULSION INTRAVENOUS at 11:15

## 2019-12-20 RX ADMIN — Medication 10 MG: at 11:41

## 2019-12-20 ASSESSMENT — PULMONARY FUNCTION TESTS
PIF_VALUE: 1
PIF_VALUE: 0

## 2019-12-20 ASSESSMENT — PAIN SCALES - GENERAL
PAINLEVEL_OUTOF10: 0

## 2019-12-20 ASSESSMENT — PAIN - FUNCTIONAL ASSESSMENT: PAIN_FUNCTIONAL_ASSESSMENT: 0-10

## 2019-12-20 ASSESSMENT — LIFESTYLE VARIABLES: SMOKING_STATUS: 0

## 2020-01-07 ENCOUNTER — OFFICE VISIT (OUTPATIENT)
Dept: SURGERY | Age: 65
End: 2020-01-07

## 2020-01-07 PROCEDURE — 99024 POSTOP FOLLOW-UP VISIT: CPT | Performed by: SURGERY

## 2020-01-28 ASSESSMENT — ENCOUNTER SYMPTOMS: SHORTNESS OF BREATH: 0

## 2020-02-28 RX ORDER — ATORVASTATIN CALCIUM 40 MG/1
TABLET, FILM COATED ORAL
Qty: 90 TABLET | Refills: 3 | Status: SHIPPED | OUTPATIENT
Start: 2020-02-28 | End: 2020-05-18 | Stop reason: SDUPTHER

## 2020-03-02 ENCOUNTER — OFFICE VISIT (OUTPATIENT)
Dept: FAMILY MEDICINE CLINIC | Age: 65
End: 2020-03-02
Payer: MEDICARE

## 2020-03-02 VITALS
BODY MASS INDEX: 23.64 KG/M2 | HEIGHT: 61 IN | WEIGHT: 125.2 LBS | TEMPERATURE: 98.3 F | DIASTOLIC BLOOD PRESSURE: 82 MMHG | SYSTOLIC BLOOD PRESSURE: 128 MMHG

## 2020-03-02 PROCEDURE — 1123F ACP DISCUSS/DSCN MKR DOCD: CPT | Performed by: INTERNAL MEDICINE

## 2020-03-02 PROCEDURE — G8484 FLU IMMUNIZE NO ADMIN: HCPCS | Performed by: INTERNAL MEDICINE

## 2020-03-02 PROCEDURE — G8427 DOCREV CUR MEDS BY ELIG CLIN: HCPCS | Performed by: INTERNAL MEDICINE

## 2020-03-02 PROCEDURE — 99213 OFFICE O/P EST LOW 20 MIN: CPT | Performed by: INTERNAL MEDICINE

## 2020-03-02 PROCEDURE — 1036F TOBACCO NON-USER: CPT | Performed by: INTERNAL MEDICINE

## 2020-03-02 PROCEDURE — 3017F COLORECTAL CA SCREEN DOC REV: CPT | Performed by: INTERNAL MEDICINE

## 2020-03-02 PROCEDURE — 4040F PNEUMOC VAC/ADMIN/RCVD: CPT | Performed by: INTERNAL MEDICINE

## 2020-03-02 PROCEDURE — 1090F PRES/ABSN URINE INCON ASSESS: CPT | Performed by: INTERNAL MEDICINE

## 2020-03-02 PROCEDURE — G8399 PT W/DXA RESULTS DOCUMENT: HCPCS | Performed by: INTERNAL MEDICINE

## 2020-03-02 PROCEDURE — G8420 CALC BMI NORM PARAMETERS: HCPCS | Performed by: INTERNAL MEDICINE

## 2020-03-02 RX ORDER — CEFUROXIME AXETIL 250 MG/1
250 TABLET ORAL 2 TIMES DAILY
Qty: 20 TABLET | Refills: 0 | Status: SHIPPED | OUTPATIENT
Start: 2020-03-02 | End: 2020-03-12

## 2020-03-02 ASSESSMENT — ENCOUNTER SYMPTOMS
DIARRHEA: 0
BLOOD IN STOOL: 0
CONSTIPATION: 0
APNEA: 0
SHORTNESS OF BREATH: 0
ABDOMINAL PAIN: 0
COUGH: 0

## 2020-03-02 NOTE — PROGRESS NOTES
LIGATION  1985     Family History   Problem Relation Age of Onset    Diabetes Mother     Hypertension Mother     Heart Disease Mother     Stroke Father     Hypertension Father     Heart Disease Father         mi    Cancer Sister 62        BREAST    Heart Disease Brother     Diabetes Brother      Social History     Tobacco Use    Smoking status: Former Smoker     Packs/day: 1.00     Years: 10.00     Pack years: 10.00     Last attempt to quit: 1998     Years since quittin.4    Smokeless tobacco: Never Used   Substance Use Topics    Alcohol use: Yes     Comment: once a week       Review of Systems   Constitutional: Negative for chills and diaphoresis. HENT: Negative for congestion. Patient presents with:  Ear Fullness: both ears feel clogged -right being the worst     Respiratory: Negative for apnea, cough and shortness of breath. Cardiovascular: Negative for chest pain and palpitations. Gastrointestinal: Negative for abdominal pain, blood in stool, constipation and diarrhea. Genitourinary: Negative for dysuria, frequency and urgency. Objective:   Physical Exam  Constitutional:       Appearance: Normal appearance. HENT:      Head: Normocephalic and atraumatic. Comments: Serous otitis bilat . Edema bilat. nasal turbinates with drainage . Cardiovascular:      Rate and Rhythm: Normal rate. Pulmonary:      Effort: Pulmonary effort is normal. No respiratory distress. Abdominal:      General: There is no distension. Tenderness: There is no abdominal tenderness. There is no guarding. Skin:     Coloration: Skin is not jaundiced. Neurological:      Mental Status: She is alert. Assessment:       Diagnosis Orders   1. Acute bacterial sinusitis     2.  Non-recurrent acute serous otitis media of both ears           Plan:      Outpatient Encounter Medications as of 3/2/2020   Medication Sig Dispense Refill    cefUROXime (CEFTIN) 250 MG tablet Take 1 tablet by

## 2020-04-27 RX ORDER — OMEPRAZOLE 20 MG/1
CAPSULE, DELAYED RELEASE ORAL
Qty: 90 CAPSULE | Refills: 3 | Status: SHIPPED | OUTPATIENT
Start: 2020-04-27 | End: 2020-05-18 | Stop reason: SDUPTHER

## 2020-05-15 RX ORDER — CITALOPRAM 20 MG/1
20 TABLET ORAL DAILY
Qty: 90 TABLET | Refills: 1 | Status: SHIPPED | OUTPATIENT
Start: 2020-05-15 | End: 2020-05-18 | Stop reason: SDUPTHER

## 2020-05-18 ENCOUNTER — VIRTUAL VISIT (OUTPATIENT)
Dept: FAMILY MEDICINE CLINIC | Age: 65
End: 2020-05-18
Payer: MEDICARE

## 2020-05-18 PROCEDURE — 99214 OFFICE O/P EST MOD 30 MIN: CPT | Performed by: INTERNAL MEDICINE

## 2020-05-18 PROCEDURE — 1123F ACP DISCUSS/DSCN MKR DOCD: CPT | Performed by: INTERNAL MEDICINE

## 2020-05-18 PROCEDURE — 1090F PRES/ABSN URINE INCON ASSESS: CPT | Performed by: INTERNAL MEDICINE

## 2020-05-18 PROCEDURE — G8427 DOCREV CUR MEDS BY ELIG CLIN: HCPCS | Performed by: INTERNAL MEDICINE

## 2020-05-18 PROCEDURE — G8399 PT W/DXA RESULTS DOCUMENT: HCPCS | Performed by: INTERNAL MEDICINE

## 2020-05-18 PROCEDURE — 3017F COLORECTAL CA SCREEN DOC REV: CPT | Performed by: INTERNAL MEDICINE

## 2020-05-18 PROCEDURE — 4040F PNEUMOC VAC/ADMIN/RCVD: CPT | Performed by: INTERNAL MEDICINE

## 2020-05-18 RX ORDER — ATORVASTATIN CALCIUM 40 MG/1
TABLET, FILM COATED ORAL
Qty: 90 TABLET | Refills: 3 | Status: SHIPPED | OUTPATIENT
Start: 2020-05-18 | End: 2020-11-16 | Stop reason: SDUPTHER

## 2020-05-18 RX ORDER — CITALOPRAM 20 MG/1
20 TABLET ORAL DAILY
Qty: 90 TABLET | Refills: 1 | Status: SHIPPED | OUTPATIENT
Start: 2020-05-18 | End: 2020-11-16 | Stop reason: SDUPTHER

## 2020-05-18 RX ORDER — OMEPRAZOLE 20 MG/1
CAPSULE, DELAYED RELEASE ORAL
Qty: 90 CAPSULE | Refills: 3 | Status: SHIPPED | OUTPATIENT
Start: 2020-05-18 | End: 2020-11-16 | Stop reason: SDUPTHER

## 2020-05-18 ASSESSMENT — ENCOUNTER SYMPTOMS
COUGH: 0
SHORTNESS OF BREATH: 0
SINUS PAIN: 0
SINUS PRESSURE: 0
DIARRHEA: 0
RHINORRHEA: 0
SORE THROAT: 0
VOMITING: 0
WHEEZING: 0
ABDOMINAL PAIN: 0
NAUSEA: 0
BLOOD IN STOOL: 0
APNEA: 0
CONSTIPATION: 0

## 2020-08-18 NOTE — PROGRESS NOTES
Patient advised to complete fasting lab order.
Alert and awake  [x] Oriented to person/place/time [x]Able to follow commands      Eyes:  EOM    [x]  Normal  [] Abnormal-  Sclera  [x]  Normal  [] Abnormal -         Discharge [x]  None visible  [] Abnormal -    HENT:   [x] Normocephalic, atraumatic. [] Abnormal   [x] Mouth/Throat: Mucous membranes are moist.     External Ears [x] Normal  [] Abnormal-     Neck: [x] No visualized mass     Pulmonary/Chest: [x] Respiratory effort normal.  [x] No visualized signs of difficulty breathing or respiratory distress        [] Abnormal-      Musculoskeletal:   [] Normal gait with no signs of ataxia         [x] Normal range of motion of neck        [] Abnormal-       Neurological:        [x] No Facial Asymmetry (Cranial nerve 7 motor function) (limited exam to video visit)          [x] No gaze palsy        [] Abnormal-         Skin:        [x] No significant exanthematous lesions or discoloration noted on facial skin         [] Abnormal-            Psychiatric:       [x] Normal Affect [] No Hallucinations        [] Abnormal-     Other pertinent observable physical exam findings-     ASSESSMENT/PLAN:     Diagnosis Orders   1. Hypercholesterolemia  Lipid Panel    AST    ALT   2. Hyperglycemia  Hemoglobin A1C   3. Gastroesophageal reflux disease, esophagitis presence not specified       Outpatient Encounter Medications as of 5/18/2020   Medication Sig Dispense Refill    citalopram (CELEXA) 20 MG tablet Take 1 tablet by mouth daily 90 tablet 1    omeprazole (PRILOSEC) 20 MG delayed release capsule TAKE ONE CAPSULE BY MOUTH DAILY 90 capsule 3    atorvastatin (LIPITOR) 40 MG tablet 1 po q hs 90 tablet 3    Calcium Carbonate-Vitamin D (CALTRATE 600+D PO) Take 1 tablet by mouth 2 times daily      Biotin 1 MG CAPS Take  by mouth daily.  fish oil-omega-3 fatty acids 1000 MG capsule Take 1 g by mouth 2 times daily       Multiple Vitamins-Minerals (CENTRUM SILVER PO) Take  by mouth daily.         aspirin 81 MG EC tablet Take

## 2020-08-20 DIAGNOSIS — R73.9 HYPERGLYCEMIA: ICD-10-CM

## 2020-08-20 DIAGNOSIS — E78.00 HYPERCHOLESTEROLEMIA: ICD-10-CM

## 2020-08-20 LAB
ALT SERPL-CCNC: 13 U/L (ref 10–40)
AST SERPL-CCNC: 24 U/L (ref 15–37)
CHOLESTEROL, TOTAL: 121 MG/DL (ref 0–199)
HDLC SERPL-MCNC: 58 MG/DL (ref 40–60)
LDL CHOLESTEROL CALCULATED: 50 MG/DL
TRIGL SERPL-MCNC: 66 MG/DL (ref 0–150)
VLDLC SERPL CALC-MCNC: 13 MG/DL

## 2020-08-21 LAB
ESTIMATED AVERAGE GLUCOSE: 111.2 MG/DL
HBA1C MFR BLD: 5.5 %

## 2020-11-16 ENCOUNTER — OFFICE VISIT (OUTPATIENT)
Dept: FAMILY MEDICINE CLINIC | Age: 65
End: 2020-11-16
Payer: MEDICARE

## 2020-11-16 VITALS
BODY MASS INDEX: 23.18 KG/M2 | SYSTOLIC BLOOD PRESSURE: 132 MMHG | DIASTOLIC BLOOD PRESSURE: 70 MMHG | HEIGHT: 61 IN | WEIGHT: 122.8 LBS | TEMPERATURE: 98 F

## 2020-11-16 PROCEDURE — 1123F ACP DISCUSS/DSCN MKR DOCD: CPT | Performed by: INTERNAL MEDICINE

## 2020-11-16 PROCEDURE — 3017F COLORECTAL CA SCREEN DOC REV: CPT | Performed by: INTERNAL MEDICINE

## 2020-11-16 PROCEDURE — 1090F PRES/ABSN URINE INCON ASSESS: CPT | Performed by: INTERNAL MEDICINE

## 2020-11-16 PROCEDURE — 1036F TOBACCO NON-USER: CPT | Performed by: INTERNAL MEDICINE

## 2020-11-16 PROCEDURE — G8427 DOCREV CUR MEDS BY ELIG CLIN: HCPCS | Performed by: INTERNAL MEDICINE

## 2020-11-16 PROCEDURE — G8399 PT W/DXA RESULTS DOCUMENT: HCPCS | Performed by: INTERNAL MEDICINE

## 2020-11-16 PROCEDURE — G8420 CALC BMI NORM PARAMETERS: HCPCS | Performed by: INTERNAL MEDICINE

## 2020-11-16 PROCEDURE — 99214 OFFICE O/P EST MOD 30 MIN: CPT | Performed by: INTERNAL MEDICINE

## 2020-11-16 PROCEDURE — 4040F PNEUMOC VAC/ADMIN/RCVD: CPT | Performed by: INTERNAL MEDICINE

## 2020-11-16 PROCEDURE — G8482 FLU IMMUNIZE ORDER/ADMIN: HCPCS | Performed by: INTERNAL MEDICINE

## 2020-11-16 RX ORDER — CITALOPRAM 20 MG/1
20 TABLET ORAL DAILY
Qty: 90 TABLET | Refills: 1 | Status: SHIPPED | OUTPATIENT
Start: 2020-11-16 | End: 2021-08-16

## 2020-11-16 RX ORDER — ATORVASTATIN CALCIUM 40 MG/1
TABLET, FILM COATED ORAL
Qty: 90 TABLET | Refills: 3 | Status: SHIPPED | OUTPATIENT
Start: 2020-11-16 | End: 2022-02-14

## 2020-11-16 RX ORDER — OMEPRAZOLE 20 MG/1
CAPSULE, DELAYED RELEASE ORAL
Qty: 90 CAPSULE | Refills: 3 | Status: SHIPPED | OUTPATIENT
Start: 2020-11-16 | End: 2022-02-14

## 2020-11-16 ASSESSMENT — ENCOUNTER SYMPTOMS
APNEA: 0
SINUS PAIN: 0
COUGH: 0
CONSTIPATION: 0
SHORTNESS OF BREATH: 0
BLOOD IN STOOL: 0
DIARRHEA: 0
SINUS PRESSURE: 0
WHEEZING: 0
SORE THROAT: 0
VOMITING: 0
NAUSEA: 0
ABDOMINAL PAIN: 0

## 2020-11-16 NOTE — PROGRESS NOTES
Immunization History   Administered Date(s) Administered    Influenza Vaccine, unspecified formulation 10/31/2017    Influenza Virus Vaccine 11/01/2014, 10/01/2015, 10/28/2016    Influenza Whole 11/01/2012, 11/01/2014, 10/01/2015, 10/28/2016    Influenza, High Dose (Fluzone 65 yrs and older) 10/02/2019    Influenza, Quadv, IM, PF (6 mo and older Fluzone, Flulaval, Fluarix, and 3 yrs and older Afluria) 10/10/2018    Influenza, Quadv, Recombinant, IM PF (Flublok 18 yrs and older) 10/07/2020    Pneumococcal Polysaccharide ([id number]) 03/04/2016    Tdap (Boostrix, Adacel) 06/26/2007, 03/04/2016    Zoster Live (Zostavax) 04/23/2015

## 2020-11-16 NOTE — PATIENT INSTRUCTIONS
Thank you for choosing [facility]. Please bring a current list of medications to every appointment. Please contact your pharmacy for any prescription refill(s) that you are requesting.

## 2020-11-16 NOTE — PROGRESS NOTES
Chief Complaint   Patient presents with    Check-Up     hyperlipidemia, GERD, anxiety- patient denies any complaints at this time   Subjective:      Patient ID: Therese Larios is a 76 y.o. female. HPI   Chief Complaint   Patient presents with    Check-Up     hyperlipidemia, GERD, anxiety- patient denies any complaints at this time     Therese Larios is a 76 y.o. female with the following history as recorded in [facility]:  Patient Active Problem List    Diagnosis Date Noted    Left inguinal hernia     Complete tear of right rotator cuff 07/25/2017    Hypercholesterolemia 01/31/2014    Dysphagia 01/29/2013    GERD (gastroesophageal reflux disease) 01/27/2012    Ulcer, esophagus     Depression     Anxiety      Current Outpatient Medications   Medication Sig Dispense Refill    citalopram (CELEXA) 20 MG tablet Take 1 tablet by mouth daily 90 tablet 1    omeprazole (PRILOSEC) 20 MG delayed release capsule TAKE ONE CAPSULE BY MOUTH DAILY 90 capsule 3    atorvastatin (LIPITOR) 40 MG tablet 1 po q hs 90 tablet 3    Calcium Carbonate-Vitamin D (CALTRATE 600+D PO) Take 1 tablet by mouth 2 times daily      Biotin 1 MG CAPS Take  by mouth daily.  fish oil-omega-3 fatty acids 1000 MG capsule Take 1 g by mouth 2 times daily       Multiple Vitamins-Minerals (CENTRUM SILVER PO) Take  by mouth daily.  aspirin 81 MG EC tablet Take 81 mg by mouth daily. No current facility-administered medications for this visit. Allergies: Patient has no known allergies.   Past Medical History:   Diagnosis Date    Acid reflux     Anxiety     Depression     Hyperlipidemia     controlled with meds    Ulcer, esophagus      Past Surgical History:   Procedure Laterality Date    BREAST LUMPECTOMY      RIGHT BREAST X 3 (BENIGN)    BUNIONECTOMY Left 2008    BUNIONECTOMY Right     COLONOSCOPY      COSMETIC SURGERY  2014    face lift    FOOT SURGERY      SCREWS PLACED IN RIGHT FOOT    HERNIA REPAIR Left 12/20/2019 LEFT INGUINAL HERNIA REPAIR WITH MESH and ON Q PUMP performed [facility] ARTHROSCOPY Right 2017    TUBAL LIGATION  1985     Family History   Problem Relation Age of Onset    Diabetes Mother     Hypertension Mother     Heart Disease Mother     Stroke Father     Hypertension Father     Heart Disease Father         mi    Cancer Sister 62        BREAST    Heart Disease Brother     Diabetes Brother      Social History     Tobacco Use    Smoking status: Former Smoker     Packs/day: 1.00     Years: 10.00     Pack years: 10.00     Last attempt to quit: 1998     Years since quittin.1    Smokeless tobacco: Never Used   Substance Use Topics    Alcohol use: Yes     Comment: once a week       Review of Systems   Constitutional: Negative for chills, diaphoresis, fatigue and fever. HENT: Negative for congestion, postnasal drip, sinus pressure, sinus pain and sore throat. Eyes: Negative for visual disturbance. Respiratory: Negative for apnea, cough, shortness of breath and wheezing. Cardiovascular: Negative for chest pain and palpitations. Gastrointestinal: Negative for abdominal pain, blood in stool, constipation, diarrhea, nausea and vomiting. Endocrine: Negative for polyuria. Genitourinary: Negative for dysuria, frequency, hematuria and urgency. Musculoskeletal: Negative for arthralgias and myalgias. Skin: Negative for rash. Neurological: Negative for dizziness, syncope, weakness, light-headedness, numbness and headaches. Hematological: Negative for adenopathy. Objective:   Physical Exam  Constitutional:       General: She is not in acute distress. Appearance: Normal appearance. She is not ill-appearing, toxic-appearing or diaphoretic. HENT:      Head: Normocephalic and atraumatic. Right Ear: Tympanic membrane, ear canal and external ear normal. There is no impacted cerumen.       Left Ear: Tympanic membrane, ear canal and external ear normal. There is no impacted cerumen. Eyes:      General: No scleral icterus. Right eye: No discharge. Left eye: No discharge. Extraocular Movements: Extraocular movements intact. Pupils: Pupils are equal, round, and reactive to light. Neck:      Musculoskeletal: No neck rigidity. Cardiovascular:      Rate and Rhythm: Normal rate and regular rhythm. Heart sounds: No murmur. Pulmonary:      Effort: No respiratory distress. Breath sounds: No stridor. No wheezing or rhonchi. Abdominal:      General: There is no distension. Palpations: There is no mass. Tenderness: There is no abdominal tenderness. There is no guarding or rebound. Musculoskeletal:         General: No swelling. Lymphadenopathy:      Cervical: No cervical adenopathy. Skin:     Coloration: Skin is not jaundiced. Findings: No rash. Neurological:      General: No focal deficit present. Mental Status: She is alert and oriented to person, place, and time. Cranial Nerves: No cranial nerve deficit. Motor: No weakness. Psychiatric:         Mood and Affect: Mood normal.         Behavior: Behavior normal.         Thought Content: Thought content normal.         Assessment:       Diagnosis Orders   1. Hypercholesterolemia  Lipid Panel    AST    ALT   2. Hyperglycemia     3. Anxiety     4. Gastroesophageal reflux disease, unspecified whether esophagitis present     above stable      Plan:      Outpatient Encounter Medications as of 11/16/2020   Medication Sig Dispense Refill    citalopram (CELEXA) 20 MG tablet Take 1 tablet by mouth daily 90 tablet 1    omeprazole (PRILOSEC) 20 MG delayed release capsule TAKE ONE CAPSULE BY MOUTH DAILY 90 capsule 3    atorvastatin (LIPITOR) 40 MG tablet 1 po q hs 90 tablet 3    Calcium Carbonate-Vitamin D (CALTRATE 600+D PO) Take 1 tablet by mouth 2 times daily      Biotin 1 MG CAPS Take  by mouth daily.       fish

## 2020-11-24 ENCOUNTER — TELEPHONE (OUTPATIENT)
Dept: FAMILY MEDICINE CLINIC | Age: 65
End: 2020-11-24

## 2020-12-07 ENCOUNTER — OFFICE VISIT (OUTPATIENT)
Dept: PRIMARY CARE CLINIC | Age: 65
End: 2020-12-07
Payer: MEDICARE

## 2020-12-07 PROCEDURE — G8428 CUR MEDS NOT DOCUMENT: HCPCS | Performed by: NURSE PRACTITIONER

## 2020-12-07 PROCEDURE — G8420 CALC BMI NORM PARAMETERS: HCPCS | Performed by: NURSE PRACTITIONER

## 2020-12-07 PROCEDURE — 99211 OFF/OP EST MAY X REQ PHY/QHP: CPT | Performed by: NURSE PRACTITIONER

## 2020-12-07 NOTE — PROGRESS NOTES
Adan Even received a viral test for COVID-19. They were educated on isolation and quarantine as appropriate. For any symptoms, they were directed to seek care from their PCP, given contact information to establish with a doctor, directed to an urgent care or the emergency room.

## 2020-12-08 LAB — SARS-COV-2, NAA: NOT DETECTED

## 2021-02-07 ENCOUNTER — HOSPITAL ENCOUNTER (EMERGENCY)
Age: 66
Discharge: HOME | End: 2021-02-07
Payer: MEDICARE

## 2021-02-07 VITALS
SYSTOLIC BLOOD PRESSURE: 131 MMHG | RESPIRATION RATE: 18 BRPM | OXYGEN SATURATION: 99 % | DIASTOLIC BLOOD PRESSURE: 64 MMHG | BODY MASS INDEX: 21.68 KG/M2 | HEART RATE: 54 BPM | HEIGHT: 63 IN | TEMPERATURE: 97.4 F | WEIGHT: 122.36 LBS

## 2021-02-07 DIAGNOSIS — S05.02XA CORNEAL ABRASION, LEFT, INITIAL ENCOUNTER: Primary | ICD-10-CM

## 2021-02-07 PROCEDURE — 90471 IMMUNIZATION ADMIN: CPT | Performed by: PHYSICIAN ASSISTANT

## 2021-02-07 PROCEDURE — 99284 EMERGENCY DEPT VISIT MOD MDM: CPT

## 2021-02-07 PROCEDURE — 6370000000 HC RX 637 (ALT 250 FOR IP): Performed by: PHYSICIAN ASSISTANT

## 2021-02-07 PROCEDURE — 90715 TDAP VACCINE 7 YRS/> IM: CPT | Performed by: PHYSICIAN ASSISTANT

## 2021-02-07 PROCEDURE — 6360000002 HC RX W HCPCS: Performed by: PHYSICIAN ASSISTANT

## 2021-02-07 RX ORDER — TETRACAINE HYDROCHLORIDE 5 MG/ML
2 SOLUTION OPHTHALMIC ONCE
Status: COMPLETED | OUTPATIENT
Start: 2021-02-07 | End: 2021-02-07

## 2021-02-07 RX ORDER — ERYTHROMYCIN 5 MG/G
OINTMENT OPHTHALMIC
Qty: 1 TUBE | Refills: 0 | Status: SHIPPED | OUTPATIENT
Start: 2021-02-07 | End: 2021-02-17

## 2021-02-07 RX ADMIN — TETANUS TOXOID, REDUCED DIPHTHERIA TOXOID AND ACELLULAR PERTUSSIS VACCINE, ADSORBED 0.5 ML: 5; 2.5; 8; 8; 2.5 SUSPENSION INTRAMUSCULAR at 17:43

## 2021-02-07 RX ADMIN — TETRACAINE HYDROCHLORIDE 2 DROP: 5 SOLUTION OPHTHALMIC at 16:56

## 2021-02-07 RX ADMIN — FLUORESCEIN SODIUM 1 MG: 1 STRIP OPHTHALMIC at 16:54

## 2021-02-07 ASSESSMENT — PAIN DESCRIPTION - DESCRIPTORS: DESCRIPTORS: BURNING;TENDER

## 2021-02-07 ASSESSMENT — PAIN - FUNCTIONAL ASSESSMENT: PAIN_FUNCTIONAL_ASSESSMENT: PREVENTS OR INTERFERES SOME ACTIVE ACTIVITIES AND ADLS

## 2021-02-07 ASSESSMENT — PAIN DESCRIPTION - LOCATION: LOCATION: EYE

## 2021-02-07 ASSESSMENT — PAIN SCALES - GENERAL: PAINLEVEL_OUTOF10: 6

## 2021-02-07 ASSESSMENT — PAIN DESCRIPTION - ORIENTATION: ORIENTATION: LEFT

## 2021-02-07 ASSESSMENT — VISUAL ACUITY: OU: 20/25

## 2021-02-07 ASSESSMENT — PAIN DESCRIPTION - FREQUENCY: FREQUENCY: CONTINUOUS

## 2021-02-07 NOTE — ED PROVIDER NOTES
[address]       Pt Name: Nolvia Barry  MRN: [id number]  [place] 1955  Date of evaluation: 2/7/2021  Provider: CLARA Oshea    The ED Attending Physician was available for consultation but did not see or evaluate this patient. CHIEF COMPLAINT       Chief Complaint   Patient presents with    Eye Pain     patient states that she poked herself in the left eye with a paint brush 1 hour prior to arrival.        HISTORY OF PRESENT ILLNESS  (Location/Symptom, Timing/Onset, Context/Setting, Quality, Duration, Modifying Factors, Severity.)   Nolvia Barry is a 76 y.o. female who presents to the emergency department with complaints of injury to the left eye. Patient says she was doing some watercolor painting with her grandchild when she accidentally got poked in the left eye with a rash, not sure which and. She says this happened just a couple hours before my exam in the ED. She says her eyes irritated and watery but not terribly painful, and she denies any use of contact lenses. Denies any other injuries. She says she was wearing glasses at the time but they were down on her nose, not covering the eye. She denies any significant visual disturbance. No other complaints. Nursing Notes were reviewed and I agree. REVIEW OF SYSTEMS    (2-9 systems for level 4, 10 or more for level 5)     Constitutional:  Negative for fever, chills. HEENT: Positive for left eye redness and irritation. Respiratory:  Negative for cough, shortness of breath. Cardiovascular:  Negative for chest pain, palpitations. Gastrointestinal:  Negative for nausea, vomiting, abdominal pain. Genitourinary:  Negative for dysuria, hematuria, flank pain, and pelvic pain. Musculoskeletal:  Negative for myalgias, arthralgias, neck pain and neck stiffness. Neurological:  Negative for dizziness, focal weakness, numbness. Except as noted above the remainder of the review of systems was reviewed and negative. PAST MEDICAL HISTORY         Diagnosis Date    Acid reflux     Anxiety     Depression     Hyperlipidemia     controlled with meds    Ulcer, esophagus        SURGICAL HISTORY           Procedure Laterality Date    BREAST LUMPECTOMY      RIGHT BREAST X 3 (BENIGN)    BUNIONECTOMY Left     BUNIONECTOMY Right     COLONOSCOPY      COSMETIC SURGERY      face lift    FOOT SURGERY      SCREWS PLACED IN RIGHT FOOT    HERNIA REPAIR Left 2019    LEFT INGUINAL HERNIA REPAIR WITH MESH and ON Q PUMP performed by Penny Poole [facility] at [facility] ARTHROSCOPY Right 2017    TUBAL LIGATION  1985       CURRENT MEDICATIONS       Previous Medications    ASPIRIN 81 MG EC TABLET    Take 81 mg by mouth daily. ATORVASTATIN (LIPITOR) 40 MG TABLET    1 po q hs    BIOTIN 1 MG CAPS    Take  by mouth daily. CALCIUM CARBONATE-VITAMIN D (CALTRATE 600+D PO)    Take 1 tablet by mouth 2 times daily    CITALOPRAM (CELEXA) 20 MG TABLET    Take 1 tablet by mouth daily    FISH OIL-OMEGA-3 FATTY ACIDS 1000 MG CAPSULE    Take 1 g by mouth 2 times daily     MULTIPLE VITAMINS-MINERALS (CENTRUM SILVER PO)    Take  by mouth daily. OMEPRAZOLE (PRILOSEC) 20 MG DELAYED RELEASE CAPSULE    TAKE ONE CAPSULE BY MOUTH DAILY       ALLERGIES     Patient has no known allergies.     FAMILY HISTORY           Problem Relation Age of Onset    Diabetes Mother     Hypertension Mother     Heart Disease Mother     Stroke Father     Hypertension Father     Heart Disease Father         mi    Cancer Sister 62        BREAST    Heart Disease Brother     Diabetes Brother      Family Status   Relation Name Status    Mother      Father      Sister  Alive    Brother      Brother  Alive        leukemia        SOCIAL HISTORY reports that she quit smoking about 22 years ago. She has a 10.00 pack-year smoking history. She has never used smokeless tobacco. She reports current alcohol use. She reports that she does not use drugs. PHYSICAL EXAM    (up to 7 for level 4, 8 or more for level 5)     ED Triage Vitals [02/07/21 1611]   BP Temp Temp Source Pulse Resp SpO2 Height Weight   131/64 97.4 °F (36.3 °C) Temporal 54 18 99 % -- 122 lb 5.7 oz (55.5 kg)       Constitutional:  Appearing well-developed and well-nourished. No distress. HENT:  Normocephalic and atraumatic. Cardiovascular:  Normal rate, regular rhythm, normal heart sounds and intact distal pulses. Pulmonary/Chest:  Effort normal and breath sounds normal. No respiratory distress. Musculoskeletal:  Normal range of motion. No edema exhibited. Neurological:  Oriented to person, place, and time. No cranial nerve deficit. Skin:  Skin is warm and dry. Not diaphoretic. Psychiatric:  Normal mood, affect, behavior, judgment and thought content. Eye exam  Visual acuity was obtained and reviewed. Left conjunctive mildly injected diffusely. Pupils were equal, round, and reactive to light and accommodation. EOM are intact bilaterally. The conjunctival surface was examined and the upper eyelid was flipped with sterile cotton tip swab and examined underneath. Tetracaine was administered to the affected eye, followed by fluorescein stain. The eye was examined with [employer] [facility] ultraviolet lamp. There was a less than 1 cm corneal abrasion noted to the left eye over the iris at approximately the 2 o'clock position. There was no indication of keratitis or iritis. No dendritic lesions were found. DIAGNOSTIC RESULTS     RADIOLOGY:   Non-plain film images such as CT, Ultrasound and MRI are read by the radiologist. Plain radiographic images are visualized and preliminarily interpreted by CLARA Greene with the below findings:    None. (Please note that portions of this note were completed with a voice recognition program.  Efforts were made to edit the dictations but occasionally words are mis-transcribed.)    Alondra Downing, [address] [place] [address]  02/07/21 6121

## 2021-03-25 DIAGNOSIS — E78.00 HYPERCHOLESTEROLEMIA: ICD-10-CM

## 2021-03-25 LAB
ALT SERPL-CCNC: 16 U/L (ref 10–40)
AST SERPL-CCNC: 22 U/L (ref 15–37)
CHOLESTEROL, TOTAL: 133 MG/DL (ref 0–199)
HDLC SERPL-MCNC: 69 MG/DL (ref 40–60)
LDL CHOLESTEROL CALCULATED: 54 MG/DL
TRIGL SERPL-MCNC: 52 MG/DL (ref 0–150)
VLDLC SERPL CALC-MCNC: 10 MG/DL

## 2021-05-07 ENCOUNTER — OFFICE VISIT (OUTPATIENT)
Dept: FAMILY MEDICINE CLINIC | Age: 66
End: 2021-05-07
Payer: MEDICARE

## 2021-05-07 VITALS
WEIGHT: 122 LBS | HEIGHT: 63 IN | SYSTOLIC BLOOD PRESSURE: 136 MMHG | BODY MASS INDEX: 21.62 KG/M2 | DIASTOLIC BLOOD PRESSURE: 70 MMHG | TEMPERATURE: 97.9 F

## 2021-05-07 DIAGNOSIS — Z12.11 SCREENING FOR COLON CANCER: ICD-10-CM

## 2021-05-07 DIAGNOSIS — K21.9 GASTROESOPHAGEAL REFLUX DISEASE, UNSPECIFIED WHETHER ESOPHAGITIS PRESENT: ICD-10-CM

## 2021-05-07 DIAGNOSIS — Z23 NEED FOR PNEUMOCOCCAL VACCINATION: ICD-10-CM

## 2021-05-07 DIAGNOSIS — F41.9 ANXIETY: ICD-10-CM

## 2021-05-07 DIAGNOSIS — E78.00 HYPERCHOLESTEROLEMIA: Primary | ICD-10-CM

## 2021-05-07 PROCEDURE — 1036F TOBACCO NON-USER: CPT | Performed by: INTERNAL MEDICINE

## 2021-05-07 PROCEDURE — 3017F COLORECTAL CA SCREEN DOC REV: CPT | Performed by: INTERNAL MEDICINE

## 2021-05-07 PROCEDURE — 90732 PPSV23 VACC 2 YRS+ SUBQ/IM: CPT | Performed by: INTERNAL MEDICINE

## 2021-05-07 PROCEDURE — G8399 PT W/DXA RESULTS DOCUMENT: HCPCS | Performed by: INTERNAL MEDICINE

## 2021-05-07 PROCEDURE — 1090F PRES/ABSN URINE INCON ASSESS: CPT | Performed by: INTERNAL MEDICINE

## 2021-05-07 PROCEDURE — 4040F PNEUMOC VAC/ADMIN/RCVD: CPT | Performed by: INTERNAL MEDICINE

## 2021-05-07 PROCEDURE — G0009 ADMIN PNEUMOCOCCAL VACCINE: HCPCS | Performed by: INTERNAL MEDICINE

## 2021-05-07 PROCEDURE — 1123F ACP DISCUSS/DSCN MKR DOCD: CPT | Performed by: INTERNAL MEDICINE

## 2021-05-07 PROCEDURE — G8420 CALC BMI NORM PARAMETERS: HCPCS | Performed by: INTERNAL MEDICINE

## 2021-05-07 PROCEDURE — 99214 OFFICE O/P EST MOD 30 MIN: CPT | Performed by: INTERNAL MEDICINE

## 2021-05-07 PROCEDURE — G8427 DOCREV CUR MEDS BY ELIG CLIN: HCPCS | Performed by: INTERNAL MEDICINE

## 2021-05-07 SDOH — ECONOMIC STABILITY: FOOD INSECURITY: WITHIN THE PAST 12 MONTHS, YOU WORRIED THAT YOUR FOOD WOULD RUN OUT BEFORE YOU GOT MONEY TO BUY MORE.: NEVER TRUE

## 2021-05-07 ASSESSMENT — PATIENT HEALTH QUESTIONNAIRE - PHQ9
2. FEELING DOWN, DEPRESSED OR HOPELESS: 0
SUM OF ALL RESPONSES TO PHQ9 QUESTIONS 1 & 2: 0
SUM OF ALL RESPONSES TO PHQ QUESTIONS 1-9: 0
SUM OF ALL RESPONSES TO PHQ QUESTIONS 1-9: 0

## 2021-05-07 ASSESSMENT — ENCOUNTER SYMPTOMS
RHINORRHEA: 0
SINUS PRESSURE: 0
WHEEZING: 0
CONSTIPATION: 0
ABDOMINAL PAIN: 0
DIARRHEA: 0
SHORTNESS OF BREATH: 0
BLOOD IN STOOL: 0
COUGH: 0
APNEA: 0
SORE THROAT: 0
SINUS PAIN: 0
VOMITING: 0
NAUSEA: 0

## 2021-05-07 NOTE — PROGRESS NOTES
Immunization History   Administered Date(s) Administered    COVID-19, Pfizer, PF, 30mcg/0.3mL 03/19/2021, 04/09/2021    Influenza Vaccine, unspecified formulation 10/31/2017    Influenza Virus Vaccine 11/01/2014, 10/01/2015, 10/28/2016    Influenza Whole 11/01/2012, 11/01/2014, 10/01/2015, 10/28/2016    Influenza, High Dose (Fluzone 65 yrs and older) 10/02/2019    Influenza, Quadv, IM, PF (6 mo and older Fluzone, Flulaval, Fluarix, and 3 yrs and older Afluria) 10/10/2018    Influenza, Quadv, Recombinant, IM PF (Flublok 18 yrs and older) 10/07/2020    Pneumococcal Polysaccharide ([id number]) 03/04/2016    Tdap (Boostrix, Adacel) 06/26/2007, 03/04/2016, 02/07/2021    Zoster Live (Zostavax) 04/23/2015

## 2021-05-07 NOTE — PROGRESS NOTES
Nely Shields (:  1955) is a 76 y.o. female,Established patient, here for evaluation of the following chief complaint(s):  Check-Up (hyperlipidemia, hyperglycemia, GERD- patient denies any complaints at this time )      ASSESSMENT/PLAN:  1. Hypercholesterolemia  2. Need for pneumococcal vaccination  3. Screening for colon cancer     Diagnosis Orders   1. Hypercholesterolemia     2. Need for pneumococcal vaccination     3. Screening for colon cancer  SHEMAR Dasilva MD, Gastroenterology, [facility]   4. Gastroesophageal reflux disease, unspecified whether esophagitis present     5. Anxiety       Outpatient Encounter Medications as of 2021   Medication Sig Dispense Refill    citalopram (CELEXA) 20 MG tablet Take 1 tablet by mouth daily 90 tablet 1    omeprazole (PRILOSEC) 20 MG delayed release capsule TAKE ONE CAPSULE BY MOUTH DAILY 90 capsule 3    atorvastatin (LIPITOR) 40 MG tablet 1 po q hs 90 tablet 3    Calcium Carbonate-Vitamin D (CALTRATE 600+D PO) Take 1 tablet by mouth 2 times daily      Biotin 1 MG CAPS Take  by mouth daily.  fish oil-omega-3 fatty acids 1000 MG capsule Take 1 g by mouth 2 times daily       Multiple Vitamins-Minerals (CENTRUM SILVER PO) Take  by mouth daily.  aspirin 81 MG EC tablet Take 81 mg by mouth daily. No facility-administered encounter medications on file as of 2021. Orders Placed This Encounter   Procedures    PNEUMOVAX 23 subcutaneous/IM (Pneumococcal polysaccharide vaccine 23-valent >= 1yo)    SHEMAR Dasilva MD, Gastroenterology, [facility]     Referral Priority:   Routine     Referral Type:   Eval and Treat     Referral Reason:   Specialty Services Required     Referred to Provider:   Pawan Larry MD     Requested Specialty:   Gastroenterology     Number of Visits Requested:   1       No follow-ups on file.     SUBJECTIVE/OBJECTIVE:  HPI   Chief Complaint   Patient presents with   [facility] Check-Up     hyperlipidemia, hyperglycemia, GERD- patient denies any complaints at this time      Dionisio Sanchez is a 76 y.o. female with the following history as recorded in [facility]:  Patient Active Problem List    Diagnosis Date Noted    Left inguinal hernia     Complete tear of right rotator cuff 07/25/2017    Hypercholesterolemia 01/31/2014    Dysphagia 01/29/2013    GERD (gastroesophageal reflux disease) 01/27/2012    Ulcer, esophagus     Depression     Anxiety      Current Outpatient Medications   Medication Sig Dispense Refill    citalopram (CELEXA) 20 MG tablet Take 1 tablet by mouth daily 90 tablet 1    omeprazole (PRILOSEC) 20 MG delayed release capsule TAKE ONE CAPSULE BY MOUTH DAILY 90 capsule 3    atorvastatin (LIPITOR) 40 MG tablet 1 po q hs 90 tablet 3    Calcium Carbonate-Vitamin D (CALTRATE 600+D PO) Take 1 tablet by mouth 2 times daily      Biotin 1 MG CAPS Take  by mouth daily.  fish oil-omega-3 fatty acids 1000 MG capsule Take 1 g by mouth 2 times daily       Multiple Vitamins-Minerals (CENTRUM SILVER PO) Take  by mouth daily.  aspirin 81 MG EC tablet Take 81 mg by mouth daily. No current facility-administered medications for this visit. Allergies: Patient has no known allergies.   Past Medical History:   Diagnosis Date    Acid reflux     Anxiety     Depression     Hyperlipidemia     controlled with meds    Ulcer, esophagus      Past Surgical History:   Procedure Laterality Date    BREAST LUMPECTOMY      RIGHT BREAST X 3 (BENIGN)    BUNIONECTOMY Left 2008    BUNIONECTOMY Right     COLONOSCOPY      COSMETIC SURGERY  2014    face lift    FOOT SURGERY      SCREWS PLACED IN RIGHT FOOT    HERNIA REPAIR Left 12/20/2019    LEFT INGUINAL HERNIA REPAIR WITH MESH and ON Q PUMP performed by Mateusz Avalos MD at [facility] ARTHROSCOPY Right 08/04/2017    TUBAL LIGATION  1985     Family History   Problem Relation Age of Onset    Diabetes sounds: No murmur. Pulmonary:      Effort: No respiratory distress. Breath sounds: No wheezing. Abdominal:      General: There is no distension. Tenderness: There is no abdominal tenderness. There is no guarding. Musculoskeletal:         General: No swelling or deformity. Lymphadenopathy:      Cervical: No cervical adenopathy. Skin:     Coloration: Skin is not jaundiced. Findings: No rash. Neurological:      General: No focal deficit present. Mental Status: She is alert and oriented to person, place, and time. Psychiatric:         Mood and Affect: Mood normal.         Behavior: Behavior normal.         Thought Content:  Thought content normal.         Judgment: Judgment normal.                 An electronic signature was used to authenticate this note.    --Bettye George, DO

## 2021-05-07 NOTE — PATIENT INSTRUCTIONS
Thank you for choosing [facility]. Please bring a current list of medications to every appointment. Please contact your pharmacy for any prescription refill(s) that you are requesting.

## 2021-05-14 ENCOUNTER — HOSPITAL ENCOUNTER (OUTPATIENT)
Dept: WOMENS IMAGING | Age: 66
Discharge: HOME | End: 2021-05-14
Payer: MEDICARE

## 2021-05-14 DIAGNOSIS — Z12.31 BREAST CANCER SCREENING BY MAMMOGRAM: ICD-10-CM

## 2021-05-14 PROCEDURE — 77063 BREAST TOMOSYNTHESIS BI: CPT

## 2021-07-28 ENCOUNTER — OFFICE VISIT (OUTPATIENT)
Dept: FAMILY MEDICINE CLINIC | Age: 66
End: 2021-07-28
Payer: MEDICARE

## 2021-07-28 ENCOUNTER — HOSPITAL ENCOUNTER (OUTPATIENT)
Dept: GENERAL RADIOLOGY | Age: 66
Discharge: HOME | End: 2021-07-28
Payer: MEDICARE

## 2021-07-28 ENCOUNTER — HOSPITAL ENCOUNTER (OUTPATIENT)
Age: 66
Discharge: HOME | End: 2021-07-28
Payer: MEDICARE

## 2021-07-28 VITALS
SYSTOLIC BLOOD PRESSURE: 132 MMHG | WEIGHT: 122.6 LBS | DIASTOLIC BLOOD PRESSURE: 68 MMHG | BODY MASS INDEX: 21.72 KG/M2 | HEIGHT: 63 IN | TEMPERATURE: 98.2 F

## 2021-07-28 DIAGNOSIS — M25.552 HIP PAIN, LEFT: ICD-10-CM

## 2021-07-28 DIAGNOSIS — M25.552 HIP PAIN, LEFT: Primary | ICD-10-CM

## 2021-07-28 PROCEDURE — 73502 X-RAY EXAM HIP UNI 2-3 VIEWS: CPT

## 2021-07-28 PROCEDURE — 4040F PNEUMOC VAC/ADMIN/RCVD: CPT | Performed by: INTERNAL MEDICINE

## 2021-07-28 PROCEDURE — G8399 PT W/DXA RESULTS DOCUMENT: HCPCS | Performed by: INTERNAL MEDICINE

## 2021-07-28 PROCEDURE — 1090F PRES/ABSN URINE INCON ASSESS: CPT | Performed by: INTERNAL MEDICINE

## 2021-07-28 PROCEDURE — G8427 DOCREV CUR MEDS BY ELIG CLIN: HCPCS | Performed by: INTERNAL MEDICINE

## 2021-07-28 PROCEDURE — 1036F TOBACCO NON-USER: CPT | Performed by: INTERNAL MEDICINE

## 2021-07-28 PROCEDURE — 1123F ACP DISCUSS/DSCN MKR DOCD: CPT | Performed by: INTERNAL MEDICINE

## 2021-07-28 PROCEDURE — 99213 OFFICE O/P EST LOW 20 MIN: CPT | Performed by: INTERNAL MEDICINE

## 2021-07-28 PROCEDURE — 3017F COLORECTAL CA SCREEN DOC REV: CPT | Performed by: INTERNAL MEDICINE

## 2021-07-28 PROCEDURE — G8420 CALC BMI NORM PARAMETERS: HCPCS | Performed by: INTERNAL MEDICINE

## 2021-07-28 RX ORDER — METHYLPREDNISOLONE 4 MG/1
TABLET ORAL
Qty: 1 KIT | Refills: 0 | Status: SHIPPED | OUTPATIENT
Start: 2021-07-28 | End: 2021-11-05

## 2021-07-28 ASSESSMENT — PATIENT HEALTH QUESTIONNAIRE - PHQ9
SUM OF ALL RESPONSES TO PHQ QUESTIONS 1-9: 0
SUM OF ALL RESPONSES TO PHQ9 QUESTIONS 1 & 2: 0
SUM OF ALL RESPONSES TO PHQ QUESTIONS 1-9: 0
2. FEELING DOWN, DEPRESSED OR HOPELESS: 0
1. LITTLE INTEREST OR PLEASURE IN DOING THINGS: 0
SUM OF ALL RESPONSES TO PHQ QUESTIONS 1-9: 0

## 2021-07-28 ASSESSMENT — ENCOUNTER SYMPTOMS
SHORTNESS OF BREATH: 0
APNEA: 0

## 2021-07-28 NOTE — PROGRESS NOTES
Anusha Sierra (:  1955) is a 78 y.o. female,Established patient, here for evaluation of the following chief complaint(s):  Hip Pain (patient c/o left hip pain x 1 month- worse for over one week. patient denies injury and has taken OTC Advil and Arthritis relief)         ASSESSMENT/PLAN:   Diagnosis Orders   1. Hip pain, left  XR HIP LEFT (2-3 VIEWS)     Outpatient Encounter Medications as of 2021   Medication Sig Dispense Refill    methylPREDNISolone (MEDROL, PHILIP,) 4 MG tablet Take by mouth. 1 kit 0    citalopram (CELEXA) 20 MG tablet Take 1 tablet by mouth daily 90 tablet 1    omeprazole (PRILOSEC) 20 MG delayed release capsule TAKE ONE CAPSULE BY MOUTH DAILY 90 capsule 3    atorvastatin (LIPITOR) 40 MG tablet 1 po q hs 90 tablet 3    Calcium Carbonate-Vitamin D (CALTRATE 600+D PO) Take 1 tablet by mouth 2 times daily      Biotin 1 MG CAPS Take  by mouth daily.  fish oil-omega-3 fatty acids 1000 MG capsule Take 1 g by mouth 2 times daily       Multiple Vitamins-Minerals (CENTRUM SILVER PO) Take  by mouth daily.  aspirin 81 MG EC tablet Take 81 mg by mouth daily. No facility-administered encounter medications on file as of 2021. Orders Placed This Encounter   Procedures    XR HIP LEFT (2-3 VIEWS)     Standing Status:   Future     Standing Expiration Date:   2022     Order Specific Question:   Reason for exam:     Answer:   L hip pain            Subjective   SUBJECTIVE/OBJECTIVE:  HPI   Chief Complaint   Patient presents with    Hip Pain     patient c/o left hip pain x 1 month- worse for over one week.   patient denies injury and has taken OTC Advil and Arthritis relief     Anusha Sierra is a 78 y.o. female with the following history as recorded in [facility]:  Patient Active Problem List    Diagnosis Date Noted    Left inguinal hernia     Complete tear of right rotator cuff 2017    Hypercholesterolemia 2014    Dysphagia 2013    GERD (gastroesophageal reflux disease) 01/27/2012    Ulcer, esophagus     Depression     Anxiety      Current Outpatient Medications   Medication Sig Dispense Refill    citalopram (CELEXA) 20 MG tablet Take 1 tablet by mouth daily 90 tablet 1    omeprazole (PRILOSEC) 20 MG delayed release capsule TAKE ONE CAPSULE BY MOUTH DAILY 90 capsule 3    atorvastatin (LIPITOR) 40 MG tablet 1 po q hs 90 tablet 3    Calcium Carbonate-Vitamin D (CALTRATE 600+D PO) Take 1 tablet by mouth 2 times daily      Biotin 1 MG CAPS Take  by mouth daily.  fish oil-omega-3 fatty acids 1000 MG capsule Take 1 g by mouth 2 times daily       Multiple Vitamins-Minerals (CENTRUM SILVER PO) Take  by mouth daily.  aspirin 81 MG EC tablet Take 81 mg by mouth daily. No current facility-administered medications for this visit. Allergies: Patient has no known allergies.   Past Medical History:   Diagnosis Date    Acid reflux     Anxiety     Depression     Hyperlipidemia     controlled with meds    Ulcer, esophagus      Past Surgical History:   Procedure Laterality Date    BREAST LUMPECTOMY      RIGHT BREAST X 3 (BENIGN)    BUNIONECTOMY Left 2008    BUNIONECTOMY Right     COLONOSCOPY      COSMETIC SURGERY  2014    face lift    FOOT SURGERY      SCREWS PLACED IN RIGHT FOOT    HERNIA REPAIR Left 12/20/2019    LEFT INGUINAL HERNIA REPAIR WITH MESH and ON Q PUMP performed by [facility] ARTHROSCOPY Right 08/04/2017    TUBAL LIGATION  1985     Family History   Problem Relation Age of Onset    Diabetes Mother     Hypertension Mother     Heart Disease Mother     Stroke Father     Hypertension Father     Heart Disease Father         mi    Cancer Sister 62        BREAST    Breast Cancer Sister     Heart Disease Brother     Diabetes Brother      Social History     Tobacco Use    Smoking status: Former Smoker     Packs/day: 1.00     Years: 10.00     Pack years: 10.00     Quit date: 1998     Years since quittin.8    Smokeless tobacco: Never Used   Substance Use Topics    Alcohol use: Yes     Comment: once a week     Review of Systems   Constitutional: Negative for chills, diaphoresis and fatigue. HENT: Negative for postnasal drip. Eyes: Negative for visual disturbance. Respiratory: Negative for apnea and shortness of breath. Cardiovascular: Negative for palpitations. Musculoskeletal:        Patient presents with:  Hip Pain: patient c/o left hip pain x 1 month- worse for over one week. patient denies injury and has taken OTC Advil and Arthritis relief            Objective   Physical Exam  Vitals and nursing note reviewed. Constitutional:       General: She is not in acute distress. Appearance: Normal appearance. She is not toxic-appearing. HENT:      Head: Normocephalic and atraumatic. Cardiovascular:      Rate and Rhythm: Normal rate and regular rhythm. Heart sounds: No murmur heard. Pulmonary:      Effort: Pulmonary effort is normal. No respiratory distress. Breath sounds: Normal breath sounds. No wheezing. Abdominal:      General: There is no distension. Tenderness: There is no abdominal tenderness. There is no guarding. Musculoskeletal:      Cervical back: No rigidity. Lymphadenopathy:      Cervical: No cervical adenopathy. Skin:     Coloration: Skin is not jaundiced. Findings: No rash. Neurological:      Mental Status: She is alert.                   An electronic signature was used to authenticate this note.    --Jacob Ask, DO

## 2021-08-16 RX ORDER — CITALOPRAM 20 MG/1
TABLET ORAL
Qty: 90 TABLET | Refills: 1 | Status: SHIPPED | OUTPATIENT
Start: 2021-08-16 | End: 2022-02-14

## 2021-09-01 ENCOUNTER — TELEPHONE (OUTPATIENT)
Dept: FAMILY MEDICINE CLINIC | Age: 66
End: 2021-09-01

## 2021-09-01 NOTE — TELEPHONE ENCOUNTER
----- Message from [facility] sent at 9/1/2021  2:12 PM EDT -----  Subject: Message to Provider    QUESTIONS  Information for Provider? Patient would like to speak with someone   regarding everything tastes salty. Stated her lips and sweat is very   salty. Please call patient to discuss. This was noticed by daughter by   licking her arm.   ---------------------------------------------------------------------------  --------------  CALL BACK INFO  What is the best way for the office to contact you? OK to leave message on   voicemail  Preferred Call Back Phone Number? [phone]  ---------------------------------------------------------------------------  --------------  SCRIPT ANSWERS  Relationship to Patient?  Self

## 2021-09-07 NOTE — TELEPHONE ENCOUNTER
Notify patient that sweat has salt in it this is why she tastes salty . Please notify her that she should not let other people lick her since covid transmission is very high now .

## 2021-11-05 ENCOUNTER — OFFICE VISIT (OUTPATIENT)
Dept: FAMILY MEDICINE CLINIC | Age: 66
End: 2021-11-05
Payer: MEDICARE

## 2021-11-05 VITALS
TEMPERATURE: 98.1 F | HEIGHT: 63 IN | SYSTOLIC BLOOD PRESSURE: 118 MMHG | BODY MASS INDEX: 21.51 KG/M2 | DIASTOLIC BLOOD PRESSURE: 76 MMHG | WEIGHT: 121.4 LBS

## 2021-11-05 VITALS
TEMPERATURE: 98.1 F | BODY MASS INDEX: 21.44 KG/M2 | WEIGHT: 121 LBS | DIASTOLIC BLOOD PRESSURE: 76 MMHG | SYSTOLIC BLOOD PRESSURE: 118 MMHG | HEIGHT: 63 IN

## 2021-11-05 DIAGNOSIS — F41.9 ANXIETY: ICD-10-CM

## 2021-11-05 DIAGNOSIS — Z00.00 ROUTINE GENERAL MEDICAL EXAMINATION AT A HEALTH CARE FACILITY: Primary | ICD-10-CM

## 2021-11-05 DIAGNOSIS — E78.00 HYPERCHOLESTEROLEMIA: Primary | ICD-10-CM

## 2021-11-05 DIAGNOSIS — K21.9 GASTROESOPHAGEAL REFLUX DISEASE, UNSPECIFIED WHETHER ESOPHAGITIS PRESENT: ICD-10-CM

## 2021-11-05 DIAGNOSIS — E78.00 HYPERCHOLESTEROLEMIA: ICD-10-CM

## 2021-11-05 LAB
ALT SERPL-CCNC: 16 U/L (ref 10–40)
AST SERPL-CCNC: 23 U/L (ref 15–37)
CHOLESTEROL, TOTAL: 137 MG/DL (ref 0–199)
HDLC SERPL-MCNC: 69 MG/DL (ref 40–60)
LDL CHOLESTEROL CALCULATED: 53 MG/DL
TRIGL SERPL-MCNC: 75 MG/DL (ref 0–150)
VLDLC SERPL CALC-MCNC: 15 MG/DL

## 2021-11-05 PROCEDURE — G8399 PT W/DXA RESULTS DOCUMENT: HCPCS | Performed by: INTERNAL MEDICINE

## 2021-11-05 PROCEDURE — G8484 FLU IMMUNIZE NO ADMIN: HCPCS | Performed by: NURSE PRACTITIONER

## 2021-11-05 PROCEDURE — 3017F COLORECTAL CA SCREEN DOC REV: CPT | Performed by: NURSE PRACTITIONER

## 2021-11-05 PROCEDURE — 99214 OFFICE O/P EST MOD 30 MIN: CPT | Performed by: INTERNAL MEDICINE

## 2021-11-05 PROCEDURE — G0438 PPPS, INITIAL VISIT: HCPCS | Performed by: NURSE PRACTITIONER

## 2021-11-05 PROCEDURE — G8420 CALC BMI NORM PARAMETERS: HCPCS | Performed by: INTERNAL MEDICINE

## 2021-11-05 PROCEDURE — 1090F PRES/ABSN URINE INCON ASSESS: CPT | Performed by: INTERNAL MEDICINE

## 2021-11-05 PROCEDURE — 1123F ACP DISCUSS/DSCN MKR DOCD: CPT | Performed by: INTERNAL MEDICINE

## 2021-11-05 PROCEDURE — 1123F ACP DISCUSS/DSCN MKR DOCD: CPT | Performed by: NURSE PRACTITIONER

## 2021-11-05 PROCEDURE — G8484 FLU IMMUNIZE NO ADMIN: HCPCS | Performed by: INTERNAL MEDICINE

## 2021-11-05 PROCEDURE — 1036F TOBACCO NON-USER: CPT | Performed by: INTERNAL MEDICINE

## 2021-11-05 PROCEDURE — 4040F PNEUMOC VAC/ADMIN/RCVD: CPT | Performed by: NURSE PRACTITIONER

## 2021-11-05 PROCEDURE — 4040F PNEUMOC VAC/ADMIN/RCVD: CPT | Performed by: INTERNAL MEDICINE

## 2021-11-05 PROCEDURE — G8427 DOCREV CUR MEDS BY ELIG CLIN: HCPCS | Performed by: INTERNAL MEDICINE

## 2021-11-05 PROCEDURE — 3017F COLORECTAL CA SCREEN DOC REV: CPT | Performed by: INTERNAL MEDICINE

## 2021-11-05 ASSESSMENT — PATIENT HEALTH QUESTIONNAIRE - PHQ9
SUM OF ALL RESPONSES TO PHQ QUESTIONS 1-9: 0
SUM OF ALL RESPONSES TO PHQ QUESTIONS 1-9: 0
2. FEELING DOWN, DEPRESSED OR HOPELESS: 0
SUM OF ALL RESPONSES TO PHQ QUESTIONS 1-9: 0
SUM OF ALL RESPONSES TO PHQ9 QUESTIONS 1 & 2: 0
1. LITTLE INTEREST OR PLEASURE IN DOING THINGS: 0

## 2021-11-05 ASSESSMENT — LIFESTYLE VARIABLES
HOW OFTEN DURING THE LAST YEAR HAVE YOU FOUND THAT YOU WERE NOT ABLE TO STOP DRINKING ONCE YOU HAD STARTED: NEVER
HOW OFTEN DO YOU HAVE A DRINK CONTAINING ALCOHOL: MONTHLY OR LESS
HOW OFTEN DURING THE LAST YEAR HAVE YOU FAILED TO DO WHAT WAS NORMALLY EXPECTED FROM YOU BECAUSE OF DRINKING: 0
AUDIT TOTAL SCORE: 1
HAS A RELATIVE, FRIEND, DOCTOR, OR ANOTHER HEALTH PROFESSIONAL EXPRESSED CONCERN ABOUT YOUR DRINKING OR SUGGESTED YOU CUT DOWN: 0
HOW OFTEN DURING THE LAST YEAR HAVE YOU NEEDED AN ALCOHOLIC DRINK FIRST THING IN THE MORNING TO GET YOURSELF GOING AFTER A NIGHT OF HEAVY DRINKING: 0
HOW OFTEN DURING THE LAST YEAR HAVE YOU HAD A FEELING OF GUILT OR REMORSE AFTER DRINKING: 0
HOW OFTEN DURING THE LAST YEAR HAVE YOU FAILED TO DO WHAT WAS NORMALLY EXPECTED FROM YOU BECAUSE OF DRINKING: NEVER
HOW OFTEN DO YOU HAVE SIX OR MORE DRINKS ON ONE OCCASION: 0
HOW OFTEN DURING THE LAST YEAR HAVE YOU HAD A FEELING OF GUILT OR REMORSE AFTER DRINKING: NEVER
HOW OFTEN DO YOU HAVE SIX OR MORE DRINKS ON ONE OCCASION: NEVER
HAVE YOU OR SOMEONE ELSE BEEN INJURED AS A RESULT OF YOUR DRINKING: NO
AUDIT-C TOTAL SCORE: 1
HAVE YOU OR SOMEONE ELSE BEEN INJURED AS A RESULT OF YOUR DRINKING: 0
HOW OFTEN DURING THE LAST YEAR HAVE YOU NEEDED AN ALCOHOLIC DRINK FIRST THING IN THE MORNING TO GET YOURSELF GOING AFTER A NIGHT OF HEAVY DRINKING: NEVER
HOW OFTEN DURING THE LAST YEAR HAVE YOU BEEN UNABLE TO REMEMBER WHAT HAPPENED THE NIGHT BEFORE BECAUSE YOU HAD BEEN DRINKING: 0
HOW OFTEN DURING THE LAST YEAR HAVE YOU FOUND THAT YOU WERE NOT ABLE TO STOP DRINKING ONCE YOU HAD STARTED: 0
AUDIT-C TOTAL SCORE: 0
HOW MANY STANDARD DRINKS CONTAINING ALCOHOL DO YOU HAVE ON A TYPICAL DAY: 0
HOW OFTEN DO YOU HAVE A DRINK CONTAINING ALCOHOL: 1
AUDIT TOTAL SCORE: 0
HAS A RELATIVE, FRIEND, DOCTOR, OR ANOTHER HEALTH PROFESSIONAL EXPRESSED CONCERN ABOUT YOUR DRINKING OR SUGGESTED YOU CUT DOWN: NO
HOW OFTEN DURING THE LAST YEAR HAVE YOU BEEN UNABLE TO REMEMBER WHAT HAPPENED THE NIGHT BEFORE BECAUSE YOU HAD BEEN DRINKING: NEVER
HOW MANY STANDARD DRINKS CONTAINING ALCOHOL DO YOU HAVE ON A TYPICAL DAY: ONE OR TWO

## 2021-11-05 ASSESSMENT — ENCOUNTER SYMPTOMS
SINUS PRESSURE: 0
CONSTIPATION: 0
WHEEZING: 0
APNEA: 0
SINUS PAIN: 0
VOMITING: 0
NAUSEA: 0
ABDOMINAL PAIN: 0
SORE THROAT: 0
COUGH: 0
SHORTNESS OF BREATH: 0
DIARRHEA: 0
BLOOD IN STOOL: 0
RHINORRHEA: 0

## 2021-11-05 NOTE — PROGRESS NOTES
Immunization History   Administered Date(s) Administered    COVID-19, Pfizer, PF, 30mcg/0.3mL 03/19/2021, 04/09/2021    Influenza Vaccine, unspecified formulation 10/31/2017    Influenza Virus Vaccine 11/01/2014, 10/01/2015, 10/28/2016    Influenza Whole 11/01/2012, 11/01/2014, 10/01/2015, 10/28/2016    Influenza, High Dose (Fluzone 65 yrs and older) 10/02/2019    Influenza, High-dose, Quadv, 65 yrs +, IM (Fluzone) 10/19/2021    Influenza, Quadv, IM, PF (6 mo and older Fluzone, Flulaval, Fluarix, and 3 yrs and older Afluria) 10/10/2018    Influenza, Quadv, Recombinant, IM PF (Flublok 18 yrs and older) 10/07/2020    Pneumococcal Polysaccharide ([id number]) 03/04/2016, 05/07/2021    Tdap (Boostrix, Adacel) 06/26/2007, 03/04/2016, 02/07/2021    Zoster Live (Zostavax) 04/23/2015

## 2021-11-05 NOTE — PROGRESS NOTES
Medicare Annual Wellness Visit  Name: Emerson Palacios Date: 2021   MRN: <[id number]> Sex: Female   Age: 78 y.o. Ethnicity: Non- / Non    : 1955 Race: White (non-)      Ale Samuels is here for Medicare AWV    Screenings for behavioral, psychosocial and functional/safety risks, and cognitive dysfunction are all negative except as indicated below. These results, as well as other patient data from the [address] form, are documented in Flowsheets linked to this Encounter. No Known Allergies    Prior to Visit Medications    Medication Sig Taking? Authorizing Provider   citalopram (CELEXA) 20 MG tablet TAKE ONE TABLET BY MOUTH DAILY Yes [place] Ky, DO   omeprazole (PRILOSEC) 20 MG delayed release capsule TAKE ONE CAPSULE BY MOUTH DAILY Yes [place] Ky, DO   atorvastatin (LIPITOR) 40 MG tablet 1 po q hs Yes [place] Ky, DO   Calcium Carbonate-Vitamin D (CALTRATE 600+D PO) Take 1 tablet by mouth 2 times daily Yes Historical Provider, MD   Biotin 1 MG CAPS Take  by mouth daily. Yes Historical Provider, MD   fish oil-omega-3 fatty acids 1000 MG capsule Take 1 g by mouth 2 times daily  Yes Historical Provider, MD   Multiple Vitamins-Minerals (CENTRUM SILVER PO) Take  by mouth daily. Yes Historical Provider, MD   aspirin 81 MG EC tablet Take 81 mg by mouth daily.    Yes Historical Provider, MD       Past Medical History:   Diagnosis Date    Acid reflux     Anxiety     Depression     Hyperlipidemia     controlled with meds    Ulcer, esophagus        Past Surgical History:   Procedure Laterality Date    BREAST LUMPECTOMY      RIGHT BREAST X 3 (BENIGN)    BUNIONECTOMY Left     BUNIONECTOMY Right     COLONOSCOPY      COSMETIC SURGERY  2014    face lift    FOOT SURGERY      SCREWS PLACED IN RIGHT FOOT    HERNIA REPAIR Left 2019    LEFT INGUINAL HERNIA REPAIR WITH MESH and ON Q PUMP performed by Jose Mcdaniel MD at [facility] ARTHROSCOPY Right 08/04/2017    TUBAL LIGATION  1985       Family History   Problem Relation Age of Onset    Diabetes Mother     Hypertension Mother     Heart Disease Mother     Stroke Father     Hypertension Father     Heart Disease Father         mi    Cancer Sister 62        BREAST    Breast Cancer Sister     Heart Disease Brother     Diabetes Brother        CareTeam (Including outside providers/suppliers regularly involved in providing care):   Patient Care Team:  Balwinder Carnes DO as PCP - General (Internal Medicine)  Nobie Ahumada, MD as PCP - OBGYN (Obstetrics & Gynecology)  Balwinder Carnes DO as PCP - [facility] Viktoriya Raoled Provider    Wt Readings from Last 3 Encounters:   11/05/21 121 lb (54.9 kg)   11/05/21 121 lb 6.4 oz (55.1 kg)   07/28/21 122 lb 9.6 oz (55.6 kg)     Vitals:    11/05/21 0810   BP: 118/76   Site: Left Upper Arm   Position: Sitting   Cuff Size: Medium Adult   Temp: 98.1 °F (36.7 °C)   TempSrc: Temporal   Weight: 121 lb (54.9 kg)   Height: 5' 3\" (1.6 m)     Body mass index is 21.43 kg/m². Based upon direct observation of the patient, evaluation of cognition reveals recent and remote memory intact. Patient's complete Health Risk Assessment and screening values have been reviewed and are found in Flowsheets. The following problems were reviewed today and where indicated follow up appointments were made and/or referrals ordered. Positive Risk Factor Screenings with Interventions:          General Health and ACP:  General  In general, how would you say your health is?: Very Good  In the past 7 days, have you experienced any of the following?  New or Increased Pain, New or Increased Fatigue, Loneliness, Social Isolation, Stress or Anger?: None of These  Do you get the social and emotional support that you need?: Yes  Do you have a Living Will?: (!) No  Advance Directives     Power of [address] [place] [address] Will ACP-Advance Directive ACP-Power of     Not on File Not on File Not on File Not on File      General Health Risk Interventions:  · No Living Will: Advance Care Planning addressed with patient today    Health Habits/Nutrition:  Health Habits/Nutrition  Do you exercise for at least 20 minutes 2-3 times per week?: Yes  Have you lost any weight without trying in the past 3 months?: No  Do you eat only one meal per day?: (!) Yes  Have you seen the dentist within the past year?: Appointment is scheduled  Body mass index: 21.43  Health Habits/Nutrition Interventions:  · Denies at this time, up to date on vision and dental exams       Personalized Preventive Plan   Current Health Maintenance Status  Immunization History   Administered Date(s) Administered    COVID-19, Pfizer, PF, 30mcg/0.3mL 03/19/2021, 04/09/2021    Influenza Vaccine, unspecified formulation 10/31/2017    Influenza Virus Vaccine 11/01/2014, 10/01/2015, 10/28/2016    Influenza Whole 11/01/2012, 11/01/2014, 10/01/2015, 10/28/2016    Influenza, High Dose (Fluzone 65 yrs and older) 10/02/2019    Influenza, High-dose, Quadv, 65 yrs +, IM (Fluzone) 10/19/2021    Influenza, Quadv, IM, PF (6 mo and older Fluzone, Flulaval, Fluarix, and 3 yrs and older Afluria) 10/10/2018    Influenza, Quadv, Recombinant, IM PF (Flublok 18 yrs and older) 10/07/2020    Pneumococcal Polysaccharide ([id number]) 03/04/2016, 05/07/2021    Tdap (Boostrix, Adacel) 06/26/2007, 03/04/2016, 02/07/2021    Zoster Live (Zostavax) 04/23/2015        Health Maintenance   Topic Date Due    Shingles Vaccine (2 of 3) 06/18/2015    Colon cancer screen colonoscopy  03/17/2019    Annual Wellness Visit (AWV)  Never done    COVID-19 Vaccine (3 - Pfizer booster) 10/09/2021    Lipid screen  03/25/2022    Breast cancer screen  05/14/2022    DTaP/Tdap/Td vaccine (4 - Td or Tdap) 02/07/2031    DEXA (modify frequency per FRAX score)  Completed    Flu vaccine  Completed    Pneumococcal 65+ years Vaccine  Completed    Hepatitis C screen  Completed    Hepatitis A vaccine  Aged Out    Hepatitis B vaccine  Aged Out    Hib vaccine  Aged Out    Meningococcal (ACWY) vaccine  Aged Out     Recommendations for Clinc! Due: see orders and patient instructions/AVS.  . Recommended screening schedule for the next 5-10 years is provided to the patient in written form: see Patient Instructions/AVS.    There are no diagnoses linked to this encounter. Advance Care Planning   Advanced Care Planning: Discussed the patients choices for care and treatment in case of a health event that adversely affects decision-making abilities. Also discussed the patients long-term treatment options. Reviewed with the patient the [address] [place] [address] of [address] [place] [address] Declaration forms  Reviewed the process of designating a competent adult as an Agent (or -in-fact) that could take make health care decisions for the patient if incompetent. Patient was asked to complete the declaration forms, either acknowledge the forms by a public notary or an eligible witness and provide a signed copy to the practice office. Time spent (minutes): 5    Cardiovascular Disease Risk Counseling: Pt ASCVD score is 4.6%, instructed pt this is a low risk score. Assessed the patient's risk to develop cardiovascular disease and reviewed main risk factors. Reviewed steps to reduce disease risk including:   · Quitting tobacco use, reducing amount smoked, or not starting the habit  · Making healthy food choices  · Being physically active and gradualy increasing activity levels   · Reduce weight and determine a healthy BMI goal  · Monitor blood pressure and treat if higher than 140/90 mmHg  · Maintain blood total cholesterol levels under 5 mmol/l or 190 mg/dl  · Maintain LDL cholesterol levels under 3.0 mmol/l or 115 mg/dl   · Control blood glucose levels  · Consider taking aspirin (75 mg daily), once blood pressure is controlled   Provided a follow up plan.   Time spent (minutes): 5

## 2021-11-05 NOTE — PATIENT INSTRUCTIONS
Learning About Medical Power of   What is a medical power of ? A medical power of , also called a durable power of  for health care, is one type of the legal forms called advance directives. It lets you name the person you want to make treatment decisions for you if you can't speak or decide for yourself. The person you choose is called your health care agent. This person is also called a health care proxy or health care surrogate. A medical power of  may be called something else in your state. How do you choose a health care agent? Choose your health care agent carefully. This person may or may not be a family member. Talk to the person before you make your final decision. Make sure he or she is comfortable with this responsibility. It's a good idea to choose someone who:  · Is at least 25years old. · Knows you well and understands what makes life meaningful for you. · Understands your Shinto and moral values. · Will do what you want, not what he or she wants. · Will be able to make difficult choices at a stressful time. · Will be able to refuse or stop treatment, if that is what you would want, even if you could die. · Will be firm and confident with health professionals if needed. · Will ask questions to get needed information. · Lives near you or agrees to travel to you if needed. Your family may help you make medical decisions while you can still be part of that process. But it's important to choose one person to be your health care agent in case you aren't able to make decisions for yourself. If you don't fill out the legal form and name a health care agent, the decisions your family can make may be limited. A health care agent may be called something else in your state. Who will make decisions for you if you don't have a health care agent? If you don't have a health care agent or a living will, you may not get the care you want. Decisions may be made by family members who disagree about your medical care. Or decisions may be made by a medical professional who doesn't know you well. In some cases, a  makes the decisions. When you name a health care agent, it is very clear who has the power to make health decisions for you. How do you name a health care agent? You name your health care agent on a legal form. This form is usually called a medical power of . Ask your hospital, state bar association, or office on aging where to find these forms. You must sign the form to make it legal. Some states require you to get the form notarized. This means that a person called a  watches you sign the form and then he or she signs the form. Some states also require that two or more witnesses sign the form. Be sure to tell your family members and doctors who your health care agent is. Where can you learn more? Go to https://[email or web address].[email or web address] org and sign in to your [facility] account. Enter [id number] in the [employer] box to learn more about \"Learning About [address]. \"     If you do not have an account, please click on the \"Sign Up Now\" link. Current as of: March 17, 2021               Content Version: 13.0  © 4682-7494 Healthwise, Incorporated. Care instructions adapted under license by [facility] ([facility]). If you have questions about a medical condition or this instruction, always ask your healthcare professional. [place] [address] any warranty or liability for your use of this information. Learning About Living [place]  What is a living will? A living will, also called a declaration, is a legal form. It tells your family and your doctor your wishes when you can't speak for yourself. It's used by the health professionals who will treat you as you near the end of your life or if you get seriously hurt or ill.   If you put your wishes in writing, your loved ones and others will know what kind of care you want. They won't need to guess. This can ease your mind and be helpful to others. And you can change or cancel your living will at any time. A living will is not the same as an estate or property will. An estate will explains what you want to happen with your money and property after you die. How do you use it? A living will is used to describe the kinds of treatment or life support you want as you near the end of your life or if you get seriously hurt or ill. Keep these facts in mind about living daily. · Your living will is used only if you can't speak or make decisions for yourself. Most often, one or more doctors must certify that you can't speak or decide for yourself before your living will takes effect. · If you get better and can speak for yourself again, you can accept or refuse any treatment. It doesn't matter what you said in your living will. · Some states may limit your right to refuse treatment in certain cases. For example, you may need to clearly state in your living will that you don't want artificial hydration and nutrition, such as being fed through a tube. Is a living will a legal document? A living will is a legal document. Each state has its own laws about living daily. And a living will may be called something else in your state. Here are some things to know about living daily. · You don't need an  to complete a living will. But legal advice can be helpful if your state's laws are unclear. It can also help if your health history is complicated or your family can't agree on what should be in your living will. · You can change your living will at any time. Some people find that their wishes about end-of-life care change as their health changes. If you make big changes to your living will, complete a new form. · If you move to another state, make sure that your living will is legal in the state where you now live.  In most cases, doctors will respect your wishes even if you have a form from a different state. · You might use a universal form that has been approved by many states. This kind of form can sometimes be filled out and stored online. Your digital copy will then be available wherever you have a connection to the internet. The doctors and nurses who need to treat you can find it right away. · Your state may offer an online registry. This is another place where you can store your living will online. · It's a good idea to get your living will notarized. This means using a person called a Scooters to watch two people sign, or witness, your living will. What should you know when you create a living will? Here are some questions to ask yourself as you make your living will:  · Do you know enough about life support methods that might be used? If not, talk to your doctor so you know what might be done if you can't breathe on your own, your heart stops, or you can't swallow. · What things would you still want to be able to do after you receive life-support methods? Would you want to be able to walk? To speak? To eat on your own? To live without the help of machines? · Do you want certain Scientology practices performed if you become very ill? · If you have a choice, where do you want to be cared for? In your home? At a hospital or nursing home? · If you have a choice at the end of your life, where would you prefer to die? At home? In a hospital or nursing home? Somewhere else? · Would you prefer to be buried or cremated? · Do you want your organs to be donated after you die? What should you do with your living will? · Make sure that your family members and your health care agent have copies of your living will (also called a declaration). · Give your doctor a copy of your living will. Ask him or her to keep it as part of your medical record. If you have more than one doctor, make sure that each one has a copy.   · Put a copy of your living will where it can be easily found. For example, some people may put a copy on their refrigerator door. If you are using a digital copy, be sure your doctor, family members, and health care agent know how to find and access it. Where can you learn more? Go to [email or web address] and sign in to your [employer] account. Enter X528 in the [facility] box to learn more about \"Learning About Living [place]. \"     If you do not have an account, please click on the \"Sign Up Now\" link. Current as of: March 17, 2021               Content Version: 13.0  © 9818-4558 Healthwise, [employer]. Care instructions adapted under license by [facility] [place] ([facility]). If you have questions about a medical condition or this instruction, always ask your healthcare professional. [address] any warranty or liability for your use of this information. Personalized Preventive Plan for Konrad Torrez - 11/5/2021  Medicare offers a range of preventive health benefits. Some of the tests and screenings are paid in full while other may be subject to a deductible, co-insurance, and/or copay. Some of these benefits include a comprehensive review of your medical history including lifestyle, illnesses that may run in your family, and various assessments and screenings as appropriate. After reviewing your medical record and screening and assessments performed today your provider may have ordered immunizations, labs, imaging, and/or referrals for you. A list of these orders (if applicable) as well as your Preventive Care list are included within your After Visit Summary for your review. Other Preventive Recommendations:    · A preventive eye exam performed by an eye specialist is recommended every 1-2 years to screen for glaucoma; cataracts, macular degeneration, and other eye disorders. · A preventive dental visit is recommended every 6 months.   · Try to get at least 150 minutes of

## 2022-02-14 RX ORDER — ATORVASTATIN CALCIUM 40 MG/1
TABLET, FILM COATED ORAL
Qty: 90 TABLET | Refills: 3 | Status: SHIPPED | OUTPATIENT
Start: 2022-02-14

## 2022-02-14 RX ORDER — CITALOPRAM 20 MG/1
TABLET ORAL
Qty: 90 TABLET | Refills: 1 | Status: SHIPPED | OUTPATIENT
Start: 2022-02-14 | End: 2022-08-12

## 2022-02-14 RX ORDER — OMEPRAZOLE 20 MG/1
CAPSULE, DELAYED RELEASE ORAL
Qty: 90 CAPSULE | Refills: 3 | Status: SHIPPED | OUTPATIENT
Start: 2022-02-14

## 2022-02-22 ENCOUNTER — OFFICE VISIT (OUTPATIENT)
Dept: FAMILY MEDICINE CLINIC | Age: 67
End: 2022-02-22
Payer: MEDICARE

## 2022-02-22 VITALS
TEMPERATURE: 98.6 F | DIASTOLIC BLOOD PRESSURE: 78 MMHG | HEIGHT: 63 IN | WEIGHT: 124 LBS | BODY MASS INDEX: 21.97 KG/M2 | SYSTOLIC BLOOD PRESSURE: 124 MMHG

## 2022-02-22 DIAGNOSIS — B96.89 ACUTE BACTERIAL SINUSITIS: Primary | ICD-10-CM

## 2022-02-22 DIAGNOSIS — J01.90 ACUTE BACTERIAL SINUSITIS: Primary | ICD-10-CM

## 2022-02-22 PROCEDURE — G8427 DOCREV CUR MEDS BY ELIG CLIN: HCPCS | Performed by: INTERNAL MEDICINE

## 2022-02-22 PROCEDURE — 99213 OFFICE O/P EST LOW 20 MIN: CPT | Performed by: INTERNAL MEDICINE

## 2022-02-22 PROCEDURE — 1036F TOBACCO NON-USER: CPT | Performed by: INTERNAL MEDICINE

## 2022-02-22 PROCEDURE — G8484 FLU IMMUNIZE NO ADMIN: HCPCS | Performed by: INTERNAL MEDICINE

## 2022-02-22 PROCEDURE — 3017F COLORECTAL CA SCREEN DOC REV: CPT | Performed by: INTERNAL MEDICINE

## 2022-02-22 PROCEDURE — 1123F ACP DISCUSS/DSCN MKR DOCD: CPT | Performed by: INTERNAL MEDICINE

## 2022-02-22 PROCEDURE — G8399 PT W/DXA RESULTS DOCUMENT: HCPCS | Performed by: INTERNAL MEDICINE

## 2022-02-22 PROCEDURE — 1090F PRES/ABSN URINE INCON ASSESS: CPT | Performed by: INTERNAL MEDICINE

## 2022-02-22 PROCEDURE — 4040F PNEUMOC VAC/ADMIN/RCVD: CPT | Performed by: INTERNAL MEDICINE

## 2022-02-22 PROCEDURE — G8420 CALC BMI NORM PARAMETERS: HCPCS | Performed by: INTERNAL MEDICINE

## 2022-02-22 RX ORDER — CEFUROXIME AXETIL 250 MG/1
250 TABLET ORAL 2 TIMES DAILY
Qty: 20 TABLET | Refills: 0 | Status: SHIPPED | OUTPATIENT
Start: 2022-02-22 | End: 2022-03-04

## 2022-02-22 ASSESSMENT — ENCOUNTER SYMPTOMS
SINUS PRESSURE: 1
APNEA: 0
VOMITING: 0
SORE THROAT: 0
ABDOMINAL PAIN: 0
NAUSEA: 0
CONSTIPATION: 0
RHINORRHEA: 1
DIARRHEA: 0
WHEEZING: 0
SHORTNESS OF BREATH: 0
COUGH: 0

## 2022-02-22 NOTE — PROGRESS NOTES
Marga Weinberg (:  1955) is a 78 y.o. female,Established patient, here for evaluation of the following chief complaint(s):  No chief complaint on file. ASSESSMENT/PLAN:   Diagnosis Orders   1. Acute bacterial sinusitis       Outpatient Encounter Medications as of 2022   Medication Sig Dispense Refill    cefUROXime (CEFTIN) 250 MG tablet Take 1 tablet by mouth 2 times daily for 10 days 20 tablet 0    atorvastatin (LIPITOR) 40 MG tablet TAKE ONE TABLET BY MOUTH EVERY NIGHT AT BEDTIME 90 tablet 3    omeprazole (PRILOSEC) 20 MG delayed release capsule TAKE ONE CAPSULE BY MOUTH DAILY 90 capsule 3    citalopram (CELEXA) 20 MG tablet TAKE ONE TABLET BY MOUTH DAILY 90 tablet 1    Calcium Carbonate-Vitamin D (CALTRATE 600+D PO) Take 1 tablet by mouth 2 times daily      Biotin 1 MG CAPS Take  by mouth daily.  fish oil-omega-3 fatty acids 1000 MG capsule Take 1 g by mouth 2 times daily       Multiple Vitamins-Minerals (CENTRUM SILVER PO) Take  by mouth daily.  aspirin 81 MG EC tablet Take 81 mg by mouth daily. No facility-administered encounter medications on file as of 2022. No orders of the defined types were placed in this encounter.            Subjective   SUBJECTIVE/OBJECTIVE:  HPI   Chief Complaint   Patient presents with    Sinus Problem     drainage, clogged ears, dizziness     Marga Weinberg is a 78 y.o. female with the following history as recorded in [facility]:  Patient Active Problem List    Diagnosis Date Noted    Left inguinal hernia     Complete tear of right rotator cuff 2017    Hypercholesterolemia 2014    Dysphagia 2013    GERD (gastroesophageal reflux disease) 2012    Ulcer, esophagus     Depression     Anxiety      Current Outpatient Medications   Medication Sig Dispense Refill    atorvastatin (LIPITOR) 40 MG tablet TAKE ONE TABLET BY MOUTH EVERY NIGHT AT BEDTIME 90 tablet 3    omeprazole (PRILOSEC) 20 MG delayed release capsule TAKE ONE CAPSULE BY MOUTH DAILY 90 capsule 3    citalopram (CELEXA) 20 MG tablet TAKE ONE TABLET BY MOUTH DAILY 90 tablet 1    Calcium Carbonate-Vitamin D (CALTRATE 600+D PO) Take 1 tablet by mouth 2 times daily      Biotin 1 MG CAPS Take  by mouth daily.  fish oil-omega-3 fatty acids 1000 MG capsule Take 1 g by mouth 2 times daily       Multiple Vitamins-Minerals (CENTRUM SILVER PO) Take  by mouth daily.  aspirin 81 MG EC tablet Take 81 mg by mouth daily. No current facility-administered medications for this visit. Allergies: Patient has no known allergies. Past Medical History:   Diagnosis Date    Acid reflux     Anxiety     Depression     Hyperlipidemia     controlled with meds    Ulcer, esophagus      Past Surgical History:   Procedure Laterality Date    BREAST LUMPECTOMY      RIGHT BREAST X 3 (BENIGN)    BUNIONECTOMY Left     BUNIONECTOMY Right     COLONOSCOPY      COSMETIC SURGERY  2014    face lift    FOOT SURGERY      SCREWS PLACED IN RIGHT FOOT    HERNIA REPAIR Left 2019    LEFT INGUINAL HERNIA REPAIR WITH MESH and ON Q PUMP performed [facility] ARTHROSCOPY Right 2017    TUBAL LIGATION  1985     Family History   Problem Relation Age of Onset    Diabetes Mother     Hypertension Mother     Heart Disease Mother     Stroke Father     Hypertension Father     Heart Disease Father         mi    Cancer Sister 62        BREAST    Breast Cancer Sister     Heart Disease Brother     Diabetes Brother      Social History     Tobacco Use    Smoking status: Former Smoker     Packs/day: 1.00     Years: 10.00     Pack years: 10.00     Quit date: 1998     Years since quittin.4    Smokeless tobacco: Never Used   Substance Use Topics    Alcohol use: Yes     Comment: once a week       Review of Systems   Constitutional: Negative for chills, diaphoresis and fatigue.    HENT: Positive for congestion, postnasal drip, rhinorrhea and sinus pressure. Negative for sore throat. Eyes: Negative for visual disturbance. Respiratory: Negative for apnea, cough, shortness of breath and wheezing. Cardiovascular: Negative for chest pain and palpitations. Gastrointestinal: Negative for abdominal pain, constipation, diarrhea, nausea and vomiting. Endocrine: Negative for polyuria. Genitourinary: Negative for dysuria, frequency, hematuria and urgency. Musculoskeletal: Negative for arthralgias. Skin: Negative for rash. Neurological: Negative for dizziness, weakness, numbness and headaches. Hematological: Negative for adenopathy. Objective   Physical Exam  Vitals and nursing note reviewed. Constitutional:       General: She is not in acute distress. Appearance: Normal appearance. She is not ill-appearing. HENT:      Head: Normocephalic and atraumatic. Right Ear: Tympanic membrane, ear canal and external ear normal.      Left Ear: Tympanic membrane, ear canal and external ear normal.   Eyes:      General:         Right eye: No discharge. Left eye: No discharge. Extraocular Movements: Extraocular movements intact. Pupils: Pupils are equal, round, and reactive to light. Cardiovascular:      Rate and Rhythm: Normal rate and regular rhythm. Heart sounds: No murmur heard. Pulmonary:      Effort: Pulmonary effort is normal.      Breath sounds: Normal breath sounds. Abdominal:      General: There is no distension. Tenderness: There is no abdominal tenderness. There is no rebound. Musculoskeletal:         General: No swelling or deformity. Skin:     Coloration: Skin is not jaundiced. Findings: No rash. Neurological:      General: No focal deficit present. Mental Status: She is alert and oriented to person, place, and time. Cranial Nerves: No cranial nerve deficit. Motor: No weakness.    Psychiatric:         Mood and Affect: Mood normal. Behavior: Behavior normal.         Thought Content:  Thought content normal.         Judgment: Judgment normal.                  An electronic signature was used to authenticate this note.    --Fan , DO

## 2022-03-03 NOTE — PROGRESS NOTES
Antione Conner (:  1955) is a 78 y.o. female,Established patient, here for evaluation of the following chief complaint(s):  Check-Up (hyperlipidemia, GERD, anxiety- patient denies any complaints at this time and request fasting lab order)    Antione Conner is a 78 y.o. female with the following history as recorded in [facility]:  Patient Active Problem List    Diagnosis Date Noted    Left inguinal hernia     Complete tear of right rotator cuff 2017    Hypercholesterolemia 2014    Dysphagia 2013    GERD (gastroesophageal reflux disease) 2012    Ulcer, esophagus     Depression     Anxiety      Current Outpatient Medications   Medication Sig Dispense Refill    citalopram (CELEXA) 20 MG tablet TAKE ONE TABLET BY MOUTH DAILY 90 tablet 1    omeprazole (PRILOSEC) 20 MG delayed release capsule TAKE ONE CAPSULE BY MOUTH DAILY 90 capsule 3    atorvastatin (LIPITOR) 40 MG tablet 1 po q hs 90 tablet 3    Calcium Carbonate-Vitamin D (CALTRATE 600+D PO) Take 1 tablet by mouth 2 times daily      Biotin 1 MG CAPS Take  by mouth daily.  fish oil-omega-3 fatty acids 1000 MG capsule Take 1 g by mouth 2 times daily       Multiple Vitamins-Minerals (CENTRUM SILVER PO) Take  by mouth daily.  aspirin 81 MG EC tablet Take 81 mg by mouth daily. No current facility-administered medications for this visit. Allergies: Patient has no known allergies.   Past Medical History:   Diagnosis Date    Acid reflux     Anxiety     Depression     Hyperlipidemia     controlled with meds    Ulcer, esophagus      Past Surgical History:   Procedure Laterality Date    BREAST LUMPECTOMY      RIGHT BREAST X 3 (BENIGN)    BUNIONECTOMY Left     BUNIONECTOMY Right     COLONOSCOPY      COSMETIC SURGERY      face lift    FOOT SURGERY      SCREWS PLACED IN RIGHT FOOT    HERNIA REPAIR Left 2019    LEFT INGUINAL HERNIA REPAIR WITH MESH and ON Q PUMP performed [facility] ARTHROSCOPY Right 2017    TUBAL LIGATION  1985     Family History   Problem Relation Age of Onset    Diabetes Mother     Hypertension Mother     Heart Disease Mother     Stroke Father     Hypertension Father     Heart Disease Father         mi    Cancer Sister 62        BREAST    Breast Cancer Sister     Heart Disease Brother     Diabetes Brother      Social History     Tobacco Use    Smoking status: Former Smoker     Packs/day: 1.00     Years: 10.00     Pack years: 10.00     Quit date: 1998     Years since quittin.1    Smokeless tobacco: Never Used   Substance Use Topics    Alcohol use: Yes     Comment: once a week          ASSESSMENT/PLAN:   Diagnosis Orders   1. Hypercholesterolemia  Lipid Panel    AST    ALT   2. Gastroesophageal reflux disease, unspecified whether esophagitis present     3. Anxiety     above stable     Outpatient Encounter Medications as of 2021   Medication Sig Dispense Refill    citalopram (CELEXA) 20 MG tablet TAKE ONE TABLET BY MOUTH DAILY 90 tablet 1    omeprazole (PRILOSEC) 20 MG delayed release capsule TAKE ONE CAPSULE BY MOUTH DAILY 90 capsule 3    atorvastatin (LIPITOR) 40 MG tablet 1 po q hs 90 tablet 3    Calcium Carbonate-Vitamin D (CALTRATE 600+D PO) Take 1 tablet by mouth 2 times daily      Biotin 1 MG CAPS Take  by mouth daily.  fish oil-omega-3 fatty acids 1000 MG capsule Take 1 g by mouth 2 times daily       Multiple Vitamins-Minerals (CENTRUM SILVER PO) Take  by mouth daily.  aspirin 81 MG EC tablet Take 81 mg by mouth daily.  [DISCONTINUED] methylPREDNISolone (MEDROL, PHILIP,) 4 MG tablet Take by mouth. 1 kit 0     No facility-administered encounter medications on file as of 2021.      Orders Placed This Encounter   Procedures    Lipid Panel     Standing Status:   Future     Standing Expiration Date:   2022     Order Specific Question:   Is Patient Fasting?/# of Hours Answer:   12    AST     Standing Status:   Future     Standing Expiration Date:   11/5/2022    ALT     Standing Status:   Future     Standing Expiration Date:   11/5/2022     Subjective   SUBJECTIVE/OBJECTIVE:  HPI    Review of Systems   Constitutional: Negative for chills, diaphoresis, fatigue and fever. HENT: Negative for congestion, postnasal drip, rhinorrhea, sinus pressure, sinus pain and sore throat. Eyes: Negative for visual disturbance. Respiratory: Negative for apnea, cough, shortness of breath and wheezing. Cardiovascular: Negative for chest pain and palpitations. Gastrointestinal: Negative for abdominal pain, blood in stool, constipation, diarrhea, nausea and vomiting. Endocrine: Negative for polyuria. Genitourinary: Negative for dysuria, frequency, hematuria and urgency. Musculoskeletal: Negative for arthralgias and myalgias. Skin: Negative for rash. Neurological: Negative for dizziness, syncope, weakness, light-headedness and headaches. Hematological: Negative for adenopathy. Objective   Physical Exam  Vitals and nursing note reviewed. Constitutional:       General: She is not in acute distress. Appearance: Normal appearance. She is not ill-appearing, toxic-appearing or diaphoretic. HENT:      Head: Normocephalic and atraumatic. Right Ear: Tympanic membrane normal.      Left Ear: Tympanic membrane normal.   Eyes:      Extraocular Movements: Extraocular movements intact. Pupils: Pupils are equal, round, and reactive to light. Cardiovascular:      Rate and Rhythm: Normal rate and regular rhythm. Pulmonary:      Effort: Pulmonary effort is normal. No respiratory distress. Breath sounds: Normal breath sounds. No wheezing or rhonchi. Abdominal:      General: There is no distension. Palpations: There is no mass. Tenderness: There is no abdominal tenderness. There is no rebound.    Musculoskeletal:         General: No swelling or deformity. Cervical back: No rigidity. Skin:     Findings: No erythema. Neurological:      General: No focal deficit present. Mental Status: She is alert. Cranial Nerves: No cranial nerve deficit. Motor: No weakness. Psychiatric:         Mood and Affect: Mood normal.         Thought Content:  Thought content normal.         Judgment: Judgment normal.                  An electronic signature was used to authenticate this note.    --Konrad Purvis, DO absent

## 2022-07-29 ENCOUNTER — HOSPITAL ENCOUNTER (OUTPATIENT)
Dept: WOMENS IMAGING | Age: 67
Discharge: HOME | End: 2022-07-29
Payer: MEDICARE

## 2022-07-29 DIAGNOSIS — Z12.31 BREAST CANCER SCREENING BY MAMMOGRAM: ICD-10-CM

## 2022-07-29 PROCEDURE — 77063 BREAST TOMOSYNTHESIS BI: CPT

## 2022-08-12 RX ORDER — CITALOPRAM 20 MG/1
TABLET ORAL
Qty: 90 TABLET | Refills: 1 | Status: SHIPPED | OUTPATIENT
Start: 2022-08-12

## 2022-08-19 ENCOUNTER — TELEMEDICINE (OUTPATIENT)
Dept: FAMILY MEDICINE CLINIC | Age: 67
End: 2022-08-19
Payer: MEDICARE

## 2022-08-19 DIAGNOSIS — B96.89 ACUTE BACTERIAL SINUSITIS: Primary | ICD-10-CM

## 2022-08-19 DIAGNOSIS — J01.90 ACUTE BACTERIAL SINUSITIS: Primary | ICD-10-CM

## 2022-08-19 PROCEDURE — 3017F COLORECTAL CA SCREEN DOC REV: CPT | Performed by: INTERNAL MEDICINE

## 2022-08-19 PROCEDURE — 99213 OFFICE O/P EST LOW 20 MIN: CPT | Performed by: INTERNAL MEDICINE

## 2022-08-19 PROCEDURE — G8427 DOCREV CUR MEDS BY ELIG CLIN: HCPCS | Performed by: INTERNAL MEDICINE

## 2022-08-19 PROCEDURE — 1090F PRES/ABSN URINE INCON ASSESS: CPT | Performed by: INTERNAL MEDICINE

## 2022-08-19 PROCEDURE — G8399 PT W/DXA RESULTS DOCUMENT: HCPCS | Performed by: INTERNAL MEDICINE

## 2022-08-19 PROCEDURE — 1123F ACP DISCUSS/DSCN MKR DOCD: CPT | Performed by: INTERNAL MEDICINE

## 2022-08-19 RX ORDER — CEFUROXIME AXETIL 250 MG/1
250 TABLET ORAL 2 TIMES DAILY
Qty: 20 TABLET | Refills: 0 | Status: SHIPPED | OUTPATIENT
Start: 2022-08-19 | End: 2022-08-29

## 2022-08-19 SDOH — ECONOMIC STABILITY: FOOD INSECURITY: WITHIN THE PAST 12 MONTHS, YOU WORRIED THAT YOUR FOOD WOULD RUN OUT BEFORE YOU GOT MONEY TO BUY MORE.: NEVER TRUE

## 2022-08-19 SDOH — ECONOMIC STABILITY: FOOD INSECURITY: WITHIN THE PAST 12 MONTHS, THE FOOD YOU BOUGHT JUST DIDN'T LAST AND YOU DIDN'T HAVE MONEY TO GET MORE.: NEVER TRUE

## 2022-08-19 ASSESSMENT — PATIENT HEALTH QUESTIONNAIRE - PHQ9
SUM OF ALL RESPONSES TO PHQ QUESTIONS 1-9: 0
5. POOR APPETITE OR OVEREATING: 0
9. THOUGHTS THAT YOU WOULD BE BETTER OFF DEAD, OR OF HURTING YOURSELF: 0
SUM OF ALL RESPONSES TO PHQ QUESTIONS 1-9: 0
SUM OF ALL RESPONSES TO PHQ QUESTIONS 1-9: 0
6. FEELING BAD ABOUT YOURSELF - OR THAT YOU ARE A FAILURE OR HAVE LET YOURSELF OR YOUR FAMILY DOWN: 0
SUM OF ALL RESPONSES TO PHQ9 QUESTIONS 1 & 2: 0
8. MOVING OR SPEAKING SO SLOWLY THAT OTHER PEOPLE COULD HAVE NOTICED. OR THE OPPOSITE, BEING SO FIGETY OR RESTLESS THAT YOU HAVE BEEN MOVING AROUND A LOT MORE THAN USUAL: 0
7. TROUBLE CONCENTRATING ON THINGS, SUCH AS READING THE NEWSPAPER OR WATCHING TELEVISION: 0
3. TROUBLE FALLING OR STAYING ASLEEP: 0
SUM OF ALL RESPONSES TO PHQ QUESTIONS 1-9: 0
2. FEELING DOWN, DEPRESSED OR HOPELESS: 0
1. LITTLE INTEREST OR PLEASURE IN DOING THINGS: 0
4. FEELING TIRED OR HAVING LITTLE ENERGY: 0
10. IF YOU CHECKED OFF ANY PROBLEMS, HOW DIFFICULT HAVE THESE PROBLEMS MADE IT FOR YOU TO DO YOUR WORK, TAKE CARE OF THINGS AT HOME, OR GET ALONG WITH OTHER PEOPLE: 0

## 2022-08-19 ASSESSMENT — SOCIAL DETERMINANTS OF HEALTH (SDOH): HOW HARD IS IT FOR YOU TO PAY FOR THE VERY BASICS LIKE FOOD, HOUSING, MEDICAL CARE, AND HEATING?: NOT HARD AT ALL

## 2022-08-19 NOTE — PROGRESS NOTES
2022    TELEHEALTH EVALUATION -- Audio/Visual (During ZHVWF-75 public health emergency)    HPI:  Chief Complaint   Patient presents with    Sinusitis     Ph. [phone]. Patient c/o clogged ears, sinus congestion, productive cough, fatigue, chest congestion, scratchy throat 8-9 days. Patient denies fever. Patient tested negative for Covid-19 yesterday. Patient has taken OTC Mucinex, DayQuil, Nyquil and sinus meds        Salopasqualetragn Stewart (:  1955) has requested an audio/video evaluation for the following concern(s):    Chief Complaint   Patient presents with    Sinusitis     Ph. [phone]. Patient c/o clogged ears, sinus congestion, productive cough, fatigue, chest congestion, scratchy throat 8-9 days. Patient denies fever. Patient tested negative for Covid-19 yesterday. Patient has taken OTC Mucinex, DayQuil, Nyquil and sinus meds        Review of Systems    Prior to Visit Medications    Medication Sig Taking? Authorizing Provider   citalopram (CELEXA) 20 MG tablet TAKE ONE TABLET BY MOUTH DAILY Yes Jamel Vega DO   atorvastatin (LIPITOR) 40 MG tablet TAKE ONE TABLET BY MOUTH EVERY NIGHT AT BEDTIME Yes Jamel Vega DO   omeprazole (PRILOSEC) 20 MG delayed release capsule TAKE ONE CAPSULE BY MOUTH DAILY Yes Jamel Vega DO   Calcium Carbonate-Vitamin D (CALTRATE 600+D PO) Take 1 tablet by mouth 2 times daily Yes Historical Provider, MD   Biotin 1 MG CAPS Take  by mouth daily. Yes Historical Provider, MD   fish oil-omega-3 fatty acids 1000 MG capsule Take 1 g by mouth 2 times daily  Yes Historical Provider, MD   Multiple Vitamins-Minerals (CENTRUM SILVER PO) Take  by mouth daily. Yes Historical Provider, MD   aspirin 81 MG EC tablet Take 81 mg by mouth daily.    Yes Historical Provider, MD       Social History     Tobacco Use    Smoking status: Former     Packs/day: 1.00     Years: 10.00     Pack years: 10.00     Types: Cigarettes     Quit date: 1998     Years since quitting: 23.9    Smokeless tobacco: Never   Vaping Use    Vaping Use: Never used   Substance Use Topics    Alcohol use: Yes     Comment: once a week    Drug use: No            PHYSICAL EXAMINATION:  [ INSTRUCTIONS:  \"[x]\" Indicates a positive item  \"[]\" Indicates a negative item  -- DELETE ALL ITEMS NOT EXAMINED]  Vital Signs: (As obtained by patient/caregiver or practitioner observation)    Blood pressure-  Heart rate-    Respiratory rate-  12  Temperature-  Pulse oximetry-     Constitutional: [x] Appears well-developed and well-nourished [] No apparent distress      [] Abnormal-   Mental status  [x] Alert and awake  [x] Oriented to person/place/time []Able to follow commands      Eyes:  EOM    [x]  Normal  [] Abnormal-  Sclera  [x]  Normal  [] Abnormal -         Discharge [x]  None visible  [] Abnormal -    HENT:   [x] Normocephalic, atraumatic. [] Abnormal   [] Mouth/Throat: Mucous membranes are moist.     External Ears [] Normal  [] Abnormal-     Neck: [] No visualized mass     Pulmonary/Chest: [x] Respiratory effort normal.  [x] No visualized signs of difficulty breathing or respiratory distress        [] Abnormal-      Musculoskeletal:   [] Normal gait with no signs of ataxia         [x] Normal range of motion of neck        [] Abnormal-       Neurological:        [x] No Facial Asymmetry (Cranial nerve 7 motor function) (limited exam to video visit)          [] No gaze palsy        [] Abnormal-         Skin:        [x] No significant exanthematous lesions or discoloration noted on facial skin         [] Abnormal-            Psychiatric:       [x] Normal Affect [] No Hallucinations        [] Abnormal-     Other pertinent observable physical exam findings-     ASSESSMENT/PLAN:   Diagnosis Orders   1.  Acute bacterial sinusitis           Outpatient Encounter Medications as of 8/19/2022   Medication Sig Dispense Refill    cefUROXime (CEFTIN) 250 MG tablet Take 1 tablet by mouth 2 times daily for 10 days 20 tablet 0 citalopram (CELEXA) 20 MG tablet TAKE ONE TABLET BY MOUTH DAILY 90 tablet 1    atorvastatin (LIPITOR) 40 MG tablet TAKE ONE TABLET BY MOUTH EVERY NIGHT AT BEDTIME 90 tablet 3    omeprazole (PRILOSEC) 20 MG delayed release capsule TAKE ONE CAPSULE BY MOUTH DAILY 90 capsule 3    Calcium Carbonate-Vitamin D (CALTRATE 600+D PO) Take 1 tablet by mouth 2 times daily      Biotin 1 MG CAPS Take  by mouth daily. fish oil-omega-3 fatty acids 1000 MG capsule Take 1 g by mouth 2 times daily       Multiple Vitamins-Minerals (CENTRUM SILVER PO) Take  by mouth daily. aspirin 81 MG EC tablet Take 81 mg by mouth daily. No facility-administered encounter medications on file as of 8/19/2022. No orders of the defined types were placed in this encounter. Pj Jaquez, was evaluated through a synchronous (real-time) audio-video encounter. The patient (or guardian if applicable) is aware that this is a billable service, which includes applicable co-pays. This Virtual Visit was conducted with patient's (and/or legal guardian's) consent. The visit was conducted pursuant to the emergency declaration under the [address] [place] [address], [address] [place] [address] authority and the [employer] Act. Patient identification was verified, and a caregiver was present when appropriate. The patient was located at Home: [address] [id number]. Provider was located at [facility] (Appt Dept): [address] [facility],[address],  [facility]. Total time spent on this encounter: Not billed by time    --Kole Garvin DO on 8/19/2022 at 2:38 PM    An electronic signature was used to authenticate this note.

## 2022-09-06 ENCOUNTER — TELEPHONE (OUTPATIENT)
Dept: FAMILY MEDICINE CLINIC | Age: 67
End: 2022-09-06

## 2022-09-06 DIAGNOSIS — J01.90 ACUTE BACTERIAL SINUSITIS: Primary | ICD-10-CM

## 2022-09-06 DIAGNOSIS — B96.89 ACUTE BACTERIAL SINUSITIS: Primary | ICD-10-CM

## 2022-09-12 ENCOUNTER — OFFICE VISIT (OUTPATIENT)
Dept: ENT CLINIC | Age: 67
End: 2022-09-12
Payer: MEDICARE

## 2022-09-12 VITALS
HEIGHT: 63 IN | SYSTOLIC BLOOD PRESSURE: 154 MMHG | DIASTOLIC BLOOD PRESSURE: 74 MMHG | BODY MASS INDEX: 22.15 KG/M2 | HEART RATE: 77 BPM | WEIGHT: 125 LBS

## 2022-09-12 DIAGNOSIS — R49.0 DYSPHONIA: ICD-10-CM

## 2022-09-12 DIAGNOSIS — J34.3 HYPERTROPHY OF INFERIOR NASAL TURBINATE: ICD-10-CM

## 2022-09-12 DIAGNOSIS — J34.2 DEVIATED SEPTUM: ICD-10-CM

## 2022-09-12 DIAGNOSIS — J32.4 CHRONIC PANSINUSITIS: ICD-10-CM

## 2022-09-12 DIAGNOSIS — B37.0 THRUSH: ICD-10-CM

## 2022-09-12 DIAGNOSIS — J04.0 LARYNGITIS: ICD-10-CM

## 2022-09-12 DIAGNOSIS — H61.23 BILATERAL IMPACTED CERUMEN: ICD-10-CM

## 2022-09-12 DIAGNOSIS — R09.81 NASAL CONGESTION: Primary | ICD-10-CM

## 2022-09-12 DIAGNOSIS — R43.0 ANOSMIA: ICD-10-CM

## 2022-09-12 PROCEDURE — 1090F PRES/ABSN URINE INCON ASSESS: CPT | Performed by: OTOLARYNGOLOGY

## 2022-09-12 PROCEDURE — G8399 PT W/DXA RESULTS DOCUMENT: HCPCS | Performed by: OTOLARYNGOLOGY

## 2022-09-12 PROCEDURE — 99204 OFFICE O/P NEW MOD 45 MIN: CPT | Performed by: OTOLARYNGOLOGY

## 2022-09-12 PROCEDURE — 31231 NASAL ENDOSCOPY DX: CPT | Performed by: OTOLARYNGOLOGY

## 2022-09-12 PROCEDURE — 3017F COLORECTAL CA SCREEN DOC REV: CPT | Performed by: OTOLARYNGOLOGY

## 2022-09-12 PROCEDURE — G8420 CALC BMI NORM PARAMETERS: HCPCS | Performed by: OTOLARYNGOLOGY

## 2022-09-12 PROCEDURE — 1036F TOBACCO NON-USER: CPT | Performed by: OTOLARYNGOLOGY

## 2022-09-12 PROCEDURE — 69210 REMOVE IMPACTED EAR WAX UNI: CPT | Performed by: OTOLARYNGOLOGY

## 2022-09-12 PROCEDURE — 1123F ACP DISCUSS/DSCN MKR DOCD: CPT | Performed by: OTOLARYNGOLOGY

## 2022-09-12 PROCEDURE — G8427 DOCREV CUR MEDS BY ELIG CLIN: HCPCS | Performed by: OTOLARYNGOLOGY

## 2022-09-12 RX ORDER — FLUTICASONE PROPIONATE 50 MCG
1 SPRAY, SUSPENSION (ML) NASAL DAILY
Qty: 32 G | Refills: 1 | Status: SHIPPED | OUTPATIENT
Start: 2022-09-12

## 2022-09-12 RX ORDER — FLUCONAZOLE 100 MG/1
100 TABLET ORAL DAILY
Qty: 7 TABLET | Refills: 0 | Status: SHIPPED | OUTPATIENT
Start: 2022-09-12 | End: 2022-09-19

## 2022-09-12 NOTE — PROGRESS NOTES
Tara      Patient Name: [address] [place] [address],  [address] Record Number:  [id number]  Primary Care Physician:  Georgina Sandoval DO  Date of Consultation: 9/12/2022    Chief Complaint: Ear and nasal issues        HISTORY OF PRESENT ILLNESS  Orvis Estela is a(n) 75 y.o. female who presents for evaluation of ear and nasal issues. The patient says that for the last several weeks has been having issues with her ears and nose. She says that her ears both feel very plugged up. She is never had ear surgery or other ear problems. The right is probably a little worse than the left. She also says that she has nasal issues. She has had a sinus infection and had an antibiotic recently. She says that her main problem is she feels as though she cannot breathe out of the nose. In addition she has no sense of smell. She says it been a long times that she can smell normally. She does not remember breaking her nose. She does not remember any  surgeries on her nose. Patient also says that her friends have been seeing her voice sounds different. She stopped smoking decades ago. She denies weight loss, coughing up blood or other symptoms. She says she drinks plenty of water. She thinks that maybe her voice got a little bit worse after taking antibiotics. REVIEW OF SYSTEMS      PHYSICAL EXAM  GENERAL: No Acute Distress, Alert and Oriented, hoarse voice, no stridor  EYES: EOMI, Anti-icteric  HENT:   Head: Normocephalic and atraumatic. Face:  Symmetric, facial nerve intact, no sinus tenderness  Ears: See below  Mouth/Oral Cavity: Significant white discoloration of the tongue. Oropharynx/Larynx:  normal oropharynx,   Nose:Normal external nasal appearance.   Anterior rhinoscopy shows a severe rightward septal deviation with very large turbinates, see below  NECK: Normal range of motion, no thyromegaly, trachea is midline, no lymphadenopathy, no neck masses, no crepitus          PROCEDURE  Flexible nasal endoscopy and laryngoscopy. Afrin lidocaine were applied the bilateral nasal cavity. A flexible scope was used to visualize left nasal cavity. She has diffuse mucosal edema without significant polyps or purulent drainage. On the right side she has a severe rightward septal deviation and it was somewhat difficult to see into the middle meatus. I passed the scope into the nasopharynx which was normal.  Base of tongue vallecula normal.  She had significant edema of the vocal cords and supraglottic structures without a elizabeth mass or lesion. Bilateral ear exam and cerumen removal  Right ear is visualized microscope. A dense cerumen impaction was removed with a Martinez suction and alligator forceps. Tympanic membrane was intact with an aerated middle ear    On the left side again a denser and impaction was removed with a Martinez suction and alligator forceps. Tympanic membrane was intact with an aerated middle ear. ASSESSMENT/PLAN  1. Nasal congestion  The patient was complaining a lot of nasal congestion. She has a severe septal deviation and large turbinates. She certainly would benefit from a septoplasty and turbinate reduction, but I would like to get a CT scan to rule out an associated chronic sinusitis given her other symptoms. We will discuss this further in follow-up  2. Deviated septum  We will discuss septoplasty and turbinate reduction on follow-up    3. Hypertrophy of inferior nasal turbinate  As above    4. Dysphonia  Patient has quite a bit of edema of the vocal cords. This is sometimes seen in smokers, but she has not done this in years. She is also already on a PPI. Another potential etiology is a fungal laryngitis. She does not have a lot of risk factors for this other than the fact she was on antibiotics recently. I Elieser Mcneill try a round of Diflucan to see if it helps. 5. Laryngitis  As above  6.  Thrush  The patient is mostly asymptomatic,

## 2022-09-17 SDOH — HEALTH STABILITY: PHYSICAL HEALTH: ON AVERAGE, HOW MANY DAYS PER WEEK DO YOU ENGAGE IN MODERATE TO STRENUOUS EXERCISE (LIKE A BRISK WALK)?: 3 DAYS

## 2022-09-17 SDOH — HEALTH STABILITY: PHYSICAL HEALTH: ON AVERAGE, HOW MANY MINUTES DO YOU ENGAGE IN EXERCISE AT THIS LEVEL?: 30 MIN

## 2022-09-19 ENCOUNTER — OFFICE VISIT (OUTPATIENT)
Dept: ORTHOPEDIC SURGERY | Age: 67
End: 2022-09-19
Payer: MEDICARE

## 2022-09-19 VITALS — HEIGHT: 63 IN | WEIGHT: 125 LBS | BODY MASS INDEX: 22.15 KG/M2 | RESPIRATION RATE: 16 BRPM

## 2022-09-19 DIAGNOSIS — M25.512 LEFT SHOULDER PAIN, UNSPECIFIED CHRONICITY: ICD-10-CM

## 2022-09-19 DIAGNOSIS — M75.82 TENDINITIS OF LEFT ROTATOR CUFF: Primary | ICD-10-CM

## 2022-09-19 PROCEDURE — G8420 CALC BMI NORM PARAMETERS: HCPCS | Performed by: STUDENT IN AN ORGANIZED HEALTH CARE EDUCATION/TRAINING PROGRAM

## 2022-09-19 PROCEDURE — 1090F PRES/ABSN URINE INCON ASSESS: CPT | Performed by: STUDENT IN AN ORGANIZED HEALTH CARE EDUCATION/TRAINING PROGRAM

## 2022-09-19 PROCEDURE — 99203 OFFICE O/P NEW LOW 30 MIN: CPT | Performed by: STUDENT IN AN ORGANIZED HEALTH CARE EDUCATION/TRAINING PROGRAM

## 2022-09-19 PROCEDURE — 20610 DRAIN/INJ JOINT/BURSA W/O US: CPT | Performed by: STUDENT IN AN ORGANIZED HEALTH CARE EDUCATION/TRAINING PROGRAM

## 2022-09-19 PROCEDURE — 1123F ACP DISCUSS/DSCN MKR DOCD: CPT | Performed by: STUDENT IN AN ORGANIZED HEALTH CARE EDUCATION/TRAINING PROGRAM

## 2022-09-19 PROCEDURE — G8399 PT W/DXA RESULTS DOCUMENT: HCPCS | Performed by: STUDENT IN AN ORGANIZED HEALTH CARE EDUCATION/TRAINING PROGRAM

## 2022-09-19 PROCEDURE — G8427 DOCREV CUR MEDS BY ELIG CLIN: HCPCS | Performed by: STUDENT IN AN ORGANIZED HEALTH CARE EDUCATION/TRAINING PROGRAM

## 2022-09-19 PROCEDURE — 3017F COLORECTAL CA SCREEN DOC REV: CPT | Performed by: STUDENT IN AN ORGANIZED HEALTH CARE EDUCATION/TRAINING PROGRAM

## 2022-09-19 PROCEDURE — 1036F TOBACCO NON-USER: CPT | Performed by: STUDENT IN AN ORGANIZED HEALTH CARE EDUCATION/TRAINING PROGRAM

## 2022-09-19 RX ORDER — BUPIVACAINE HYDROCHLORIDE 2.5 MG/ML
4 INJECTION, SOLUTION INFILTRATION; PERINEURAL ONCE
Status: COMPLETED | OUTPATIENT
Start: 2022-09-19 | End: 2022-09-19

## 2022-09-19 RX ORDER — TRIAMCINOLONE ACETONIDE 40 MG/ML
40 INJECTION, SUSPENSION INTRA-ARTICULAR; INTRAMUSCULAR ONCE
Status: COMPLETED | OUTPATIENT
Start: 2022-09-19 | End: 2022-09-19

## 2022-09-19 RX ORDER — LIDOCAINE HYDROCHLORIDE 10 MG/ML
4 INJECTION, SOLUTION INFILTRATION; PERINEURAL ONCE
Status: COMPLETED | OUTPATIENT
Start: 2022-09-19 | End: 2022-09-19

## 2022-09-19 RX ADMIN — LIDOCAINE HYDROCHLORIDE 4 ML: 10 INJECTION, SOLUTION INFILTRATION; PERINEURAL at 09:54

## 2022-09-19 RX ADMIN — TRIAMCINOLONE ACETONIDE 40 MG: 40 INJECTION, SUSPENSION INTRA-ARTICULAR; INTRAMUSCULAR at 09:54

## 2022-09-19 RX ADMIN — BUPIVACAINE HYDROCHLORIDE 10 MG: 2.5 INJECTION, SOLUTION INFILTRATION; PERINEURAL at 09:53

## 2022-09-19 NOTE — PROGRESS NOTES
CHIEF COMPLAINT: Left shoulder pain    DATE OF ONSET: 1 week    History:    Santy Watts is a 75 y.o. right handed female self-referred for evaluation and treatment of Left shoulder pain. This is evaluated as a personal injury. The pain began 1 week ago. She states it has been tender for about a year but worsened last week. Pain is rated as a 7/10. There was not an injury. She had a right rotator cuff repair in 2018 after which she did physical therapy for both shoulders. The left shoulder bothers her with reaching overhead and behind her back. The patient has had an injection in this shoulder by Dr. Nica Dumont in 2018 which helped considerably. The patient has tried NSAIDs. The patient has tried ice. 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 Outside reports reviewed:  Dr. Freeman Hemphill notes.     Past Medical History:   Diagnosis Date    Acid reflux     Anxiety     Depression     Hyperlipidemia     controlled with meds    Ulcer, esophagus        Past Surgical History:   Procedure Laterality Date    BREAST LUMPECTOMY      RIGHT BREAST X 3 (BENIGN)    BUNIONECTOMY Left 2008    BUNIONECTOMY Right     COLONOSCOPY      COSMETIC SURGERY  2014    face lift    FOOT SURGERY      SCREWS PLACED IN RIGHT FOOT    HERNIA REPAIR Left 12/20/2019    LEFT INGUINAL HERNIA REPAIR WITH MESH and ON Q PUMP performed by Janna Nur MD at [facility] ARTHROSCOPY Right 08/04/2017    TUBAL LIGATION  1985       Current Outpatient Medications on File Prior to Visit   Medication Sig Dispense Refill    fluconazole (DIFLUCAN) 100 MG tablet Take 1 tablet by mouth daily for 7 days 7 tablet 0    fluticasone (FLONASE) 50 MCG/ACT nasal spray 1 spray by Each Nostril route daily 32 g 1    citalopram (CELEXA) 20 MG tablet TAKE ONE TABLET BY MOUTH DAILY 90 tablet 1    atorvastatin (LIPITOR) 40 MG tablet TAKE ONE TABLET BY MOUTH EVERY NIGHT AT BEDTIME 90 tablet 3    omeprazole (PRILOSEC) 20 MG delayed release capsule TAKE ONE CAPSULE BY MOUTH DAILY 90 capsule 3    Calcium Carbonate-Vitamin D (CALTRATE 600+D PO) Take 1 tablet by mouth 2 times daily      Biotin 1 MG CAPS Take  by mouth daily. fish oil-omega-3 fatty acids 1000 MG capsule Take 1 g by mouth 2 times daily       Multiple Vitamins-Minerals (CENTRUM SILVER PO) Take  by mouth daily. aspirin 81 MG EC tablet Take 81 mg by mouth daily. No current facility-administered medications on file prior to visit.        No Known Allergies    Social History     Socioeconomic History    Marital status:      Spouse name: Not on file    Number of children: Not on file    Years of education: Not on file    Highest education level: Not on file   Occupational History    Not on file   Tobacco Use    Smoking status: Former     Packs/day: 1.00     Years: 10.00     Pack years: 10.00     Types: Cigarettes     Quit date: 1998     Years since quittin.9    Smokeless tobacco: Never   Vaping Use    Vaping Use: Never used   Substance and Sexual Activity    Alcohol use: Yes     Comment: once a week    Drug use: No    Sexual activity: Not Currently   Other Topics Concern    Not on file   Social History Narrative    Not on file     Social Determinants of Health     Financial Resource Strain: Low Risk     Difficulty of Paying Living Expenses: Not hard at all   Food Insecurity: No Food Insecurity    Worried About Running Out of Food in the Last Year: Never true    [address] in the Last Year: Never true   Transportation Needs: Not on file   Physical Activity: Insufficiently Active    Days of Exercise per Week: 3 days    Minutes of Exercise per Session: 30 min   Stress: Not on file   Social Connections: Not on file   Intimate Partner Violence: Not At Risk    Fear of Current or Ex-Partner: No    Emotionally Abused: No    Physically Abused: No    Sexually Abused: No   Housing Stability: Not on file       Family History   Problem Relation Age of Onset    Diabetes Mother Hypertension Mother     Heart Disease Mother     Stroke Father     Hypertension Father     Heart Disease Father         mi    Cancer Sister 62        BREAST    Breast Cancer Sister     Heart Disease Brother     Diabetes Brother        Review of Systems:   I have reviewed the clinically relevant past medical history, medications, allergies, family history, social history, and 13 point Review of Systems from the patient's recent history form & documented any details relevant to today's presenting complaints in the history above. The patient's self-reported past medical history, medications, allergies, family history, social history, and Review of Systems form from 9/19/22 have been scanned into the chart under the \"Media\" tab. Physical Examination:      Vital signs:    Vitals:    09/19/22 0931   Resp: 16   Weight: 125 lb (56.7 kg)   Height: 5' 3\" (1.6 m)        General:   alert, appears stated age, cooperative, and no distress   Left Shoulder   Active ROM:   forward flexion 165, external rotation 85, internal rotation L4. Right shoulder: forward flexion 180, external rotation 85, internal rotation L1. Passive ROM:  forward flexion 180   Right shoulder: forward flexion 180   Joint Tenderness:   lateral acromial   Neer:   positive   Gilliam:   positive   Strength:   4/5 supraspinatus, 4+/5 ER, 5/5 IR    Right shoulder: 5/5 supraspinatus, IR, ER    Drop-arm test:   negative   Belly-press test:   negative   Bear-hug test:   positive   Speed's test:   negative   Bicipital groove tenderness:  negative   Singer's test:   negative   Cross-body adduction test:   negative    AC joint tenderness:   negative   Scapular dyskinesis:   absent  Right shoulder: absent   Atrophy:   none noted     Right shoulder: none noted      There are no skin lesions, cellulitis, or extreme edema in the upper extremities. Sensation is grossly intact to light touch bilaterally upper extremity.  The patient has warm and well-perfused bilateral upper extremities with brisk capillary refill. Imaging   Left Shoulder X-Ray: 3 views obtained and reviewed  AC Joint: narrowing moderate  Glenohumeral joint: narrowing absent  Elevation humeral head: absent        Assessment:      Left shoulder rotator cuff tendonitis       Plan:      Natural history and expected course discussed. Questions answered. WE discussed this can be treated nonoperatively. PT referral provided. Discussed subacromial cortisone injection. The risks and benefits of an injection were discussed with the patient. The patient had full opportunity to ask questions and all were answered. The patient then provided verbal informed consent. The skin was then prepped with betadine solution and alcohol. Under aseptic conditions, the  left subacromial space was injected with 4cc of 1% xylocaine, 4cc of 0.25% marcaine, and 1cc of Kenalog (40mg/ml). There were no immediate complications following the injection. The patient was advised of the possibility of injection site reaction and instructed to apply ice to the area and take NSAIDs if able. Follow up in 3 months or sooner if symptoms worsen. Iwona Piek PA-C  Board Certified by the [employer] on Certification of [address] [employer] and [employer]: This note was generated with use of a verbal recognition program and an attempt was made to check for errors. It is possible that there are still dictated errors within this office note. If so, please bring any significant errors to my attention for an addendum. All efforts were made to ensure that this office note is accurate.

## 2022-09-26 ENCOUNTER — OFFICE VISIT (OUTPATIENT)
Dept: FAMILY MEDICINE CLINIC | Age: 67
End: 2022-09-26

## 2022-09-26 ENCOUNTER — HOSPITAL ENCOUNTER (OUTPATIENT)
Age: 67
Discharge: HOME | End: 2022-09-26
Payer: MEDICARE

## 2022-09-26 ENCOUNTER — HOSPITAL ENCOUNTER (OUTPATIENT)
Dept: CT IMAGING | Age: 67
Discharge: HOME | End: 2022-09-26
Payer: MEDICARE

## 2022-09-26 ENCOUNTER — OFFICE VISIT (OUTPATIENT)
Dept: ENT CLINIC | Age: 67
End: 2022-09-26
Payer: MEDICARE

## 2022-09-26 VITALS
BODY MASS INDEX: 22.01 KG/M2 | TEMPERATURE: 98.1 F | WEIGHT: 124.2 LBS | DIASTOLIC BLOOD PRESSURE: 68 MMHG | SYSTOLIC BLOOD PRESSURE: 116 MMHG | HEIGHT: 63 IN

## 2022-09-26 VITALS
WEIGHT: 122 LBS | HEART RATE: 66 BPM | HEIGHT: 63 IN | SYSTOLIC BLOOD PRESSURE: 129 MMHG | BODY MASS INDEX: 21.62 KG/M2 | DIASTOLIC BLOOD PRESSURE: 79 MMHG

## 2022-09-26 DIAGNOSIS — J32.4 CHRONIC PANSINUSITIS: Primary | ICD-10-CM

## 2022-09-26 DIAGNOSIS — J04.0 LARYNGITIS: ICD-10-CM

## 2022-09-26 DIAGNOSIS — J34.2 NASAL SEPTAL DEVIATION: ICD-10-CM

## 2022-09-26 DIAGNOSIS — J34.2 NASAL SEPTAL DEVIATION: Primary | ICD-10-CM

## 2022-09-26 DIAGNOSIS — F41.9 ANXIETY: ICD-10-CM

## 2022-09-26 DIAGNOSIS — K21.9 GASTROESOPHAGEAL REFLUX DISEASE, UNSPECIFIED WHETHER ESOPHAGITIS PRESENT: ICD-10-CM

## 2022-09-26 DIAGNOSIS — J32.4 CHRONIC PANSINUSITIS: ICD-10-CM

## 2022-09-26 DIAGNOSIS — J34.3 HYPERTROPHY OF INFERIOR NASAL TURBINATE: ICD-10-CM

## 2022-09-26 DIAGNOSIS — R09.81 NASAL CONGESTION: ICD-10-CM

## 2022-09-26 DIAGNOSIS — J34.2 DEVIATED SEPTUM: ICD-10-CM

## 2022-09-26 DIAGNOSIS — R43.0 ANOSMIA: ICD-10-CM

## 2022-09-26 DIAGNOSIS — E78.00 HYPERCHOLESTEROLEMIA: ICD-10-CM

## 2022-09-26 PROCEDURE — 93005 ELECTROCARDIOGRAM TRACING: CPT

## 2022-09-26 PROCEDURE — 99212 OFFICE O/P EST SF 10 MIN: CPT | Performed by: INTERNAL MEDICINE

## 2022-09-26 PROCEDURE — G8427 DOCREV CUR MEDS BY ELIG CLIN: HCPCS | Performed by: OTOLARYNGOLOGY

## 2022-09-26 PROCEDURE — 3017F COLORECTAL CA SCREEN DOC REV: CPT | Performed by: OTOLARYNGOLOGY

## 2022-09-26 PROCEDURE — 1090F PRES/ABSN URINE INCON ASSESS: CPT | Performed by: OTOLARYNGOLOGY

## 2022-09-26 PROCEDURE — 1123F ACP DISCUSS/DSCN MKR DOCD: CPT | Performed by: OTOLARYNGOLOGY

## 2022-09-26 PROCEDURE — 1123F ACP DISCUSS/DSCN MKR DOCD: CPT | Performed by: INTERNAL MEDICINE

## 2022-09-26 PROCEDURE — 70486 CT MAXILLOFACIAL W/O DYE: CPT

## 2022-09-26 PROCEDURE — 1090F PRES/ABSN URINE INCON ASSESS: CPT | Performed by: INTERNAL MEDICINE

## 2022-09-26 PROCEDURE — G8420 CALC BMI NORM PARAMETERS: HCPCS | Performed by: OTOLARYNGOLOGY

## 2022-09-26 PROCEDURE — G8427 DOCREV CUR MEDS BY ELIG CLIN: HCPCS | Performed by: INTERNAL MEDICINE

## 2022-09-26 PROCEDURE — 3017F COLORECTAL CA SCREEN DOC REV: CPT | Performed by: INTERNAL MEDICINE

## 2022-09-26 PROCEDURE — G8420 CALC BMI NORM PARAMETERS: HCPCS | Performed by: INTERNAL MEDICINE

## 2022-09-26 PROCEDURE — G8399 PT W/DXA RESULTS DOCUMENT: HCPCS | Performed by: INTERNAL MEDICINE

## 2022-09-26 PROCEDURE — 99214 OFFICE O/P EST MOD 30 MIN: CPT | Performed by: OTOLARYNGOLOGY

## 2022-09-26 PROCEDURE — 1036F TOBACCO NON-USER: CPT | Performed by: OTOLARYNGOLOGY

## 2022-09-26 PROCEDURE — G8399 PT W/DXA RESULTS DOCUMENT: HCPCS | Performed by: OTOLARYNGOLOGY

## 2022-09-26 PROCEDURE — 1036F TOBACCO NON-USER: CPT | Performed by: INTERNAL MEDICINE

## 2022-09-26 ASSESSMENT — ENCOUNTER SYMPTOMS
APNEA: 0
SHORTNESS OF BREATH: 0
RHINORRHEA: 1
COUGH: 0
ABDOMINAL PAIN: 0
SINUS PRESSURE: 1
WHEEZING: 0

## 2022-09-26 NOTE — PROGRESS NOTES
2022    Vania Paula (:  1955) is a 75 y.o. female, here for a preventive medicine evaluation. Chief Complaint   Patient presents with    Pre-op Exam     SEPTOPLASTY, INFERIOR TURBINATE REDUCTION with Dr. José Powers on 10/11/2022    Vania Paula is a 75 y.o. female with the following history as recorded in [facility]:  Patient Active Problem List    Diagnosis Date Noted    Left inguinal hernia     Complete tear of right rotator cuff 2017    Hypercholesterolemia 2014    Dysphagia 2013    GERD (gastroesophageal reflux disease) 2012    Ulcer, esophagus     Depression     Anxiety      Current Outpatient Medications   Medication Sig Dispense Refill    fluticasone (FLONASE) 50 MCG/ACT nasal spray 1 spray by Each Nostril route daily 32 g 1    citalopram (CELEXA) 20 MG tablet TAKE ONE TABLET BY MOUTH DAILY 90 tablet 1    atorvastatin (LIPITOR) 40 MG tablet TAKE ONE TABLET BY MOUTH EVERY NIGHT AT BEDTIME 90 tablet 3    omeprazole (PRILOSEC) 20 MG delayed release capsule TAKE ONE CAPSULE BY MOUTH DAILY 90 capsule 3    Calcium Carbonate-Vitamin D (CALTRATE 600+D PO) Take 1 tablet by mouth 2 times daily      Biotin 1 MG CAPS Take  by mouth daily. fish oil-omega-3 fatty acids 1000 MG capsule Take 1 g by mouth 2 times daily       Multiple Vitamins-Minerals (CENTRUM SILVER PO) Take  by mouth daily. aspirin 81 MG EC tablet Take 81 mg by mouth daily. No current facility-administered medications for this visit. Allergies: Patient has no known allergies.   Past Medical History:   Diagnosis Date    Acid reflux     Anxiety     Depression     Hyperlipidemia     controlled with meds    Ulcer, esophagus      Past Surgical History:   Procedure Laterality Date    BREAST LUMPECTOMY      RIGHT BREAST X 3 (BENIGN)    BUNIONECTOMY Left     BUNIONECTOMY Right     COLONOSCOPY      COSMETIC SURGERY      face lift    FOOT SURGERY      SCREWS PLACED IN RIGHT FOOT    HERNIA REPAIR Left 2019    LEFT INGUINAL HERNIA REPAIR WITH MESH and ON Q PUMP performed by Elvia Griffin MD at [facility] ARTHROSCOPY Right 2017    TUBAL LIGATION  1985     Family History   Problem Relation Age of Onset    Diabetes Mother     Hypertension Mother     Heart Disease Mother     Stroke Father     Hypertension Father     Heart Disease Father         mi    Cancer Sister 62        BREAST    Breast Cancer Sister     Heart Disease Brother     Diabetes Brother      Social History     Tobacco Use    Smoking status: Former     Packs/day: 1.00     Years: 10.00     Pack years: 10.00     Types: Cigarettes     Quit date: 1998     Years since quittin.0    Smokeless tobacco: Never   Substance Use Topics    Alcohol use: Yes     Comment: once a week      Patient Active Problem List   Diagnosis    Ulcer, esophagus    Depression    Anxiety    GERD (gastroesophageal reflux disease)    Dysphagia    Hypercholesterolemia    Complete tear of right rotator cuff    Left inguinal hernia       Review of Systems   Constitutional:  Negative for chills, diaphoresis, fatigue and fever. HENT:  Positive for congestion, postnasal drip, rhinorrhea and sinus pressure. Eyes:  Negative for visual disturbance. Respiratory:  Negative for apnea, cough, shortness of breath and wheezing. Cardiovascular:  Negative for chest pain and palpitations. Gastrointestinal:  Negative for abdominal pain. Endocrine: Negative for polyuria. Genitourinary:  Negative for dysuria, frequency, hematuria and urgency. Musculoskeletal:  Negative for arthralgias and myalgias. Skin:  Negative for rash. Neurological:  Negative for dizziness, syncope, weakness, numbness and headaches. Hematological:  Negative for adenopathy. Prior to Visit Medications    Medication Sig Taking?  Authorizing Provider   fluticasone (FLONASE) 50 MCG/ACT nasal spray 1 spray by Each Nostril route daily Yes Rylan Atwood MD   citalopram (CELEXA) 20 MG tablet TAKE ONE TABLET BY MOUTH DAILY Yes Balwinder Carnes, DO   atorvastatin (LIPITOR) 40 MG tablet TAKE ONE TABLET BY MOUTH EVERY NIGHT AT BEDTIME Yes Balwinder Carnes, DO   omeprazole (PRILOSEC) 20 MG delayed release capsule TAKE ONE CAPSULE BY MOUTH DAILY Yes Balwinder Carnes, DO   Calcium Carbonate-Vitamin D (CALTRATE 600+D PO) Take 1 tablet by mouth 2 times daily Yes Historical Provider, MD   Biotin 1 MG CAPS Take  by mouth daily. Yes Historical Provider, MD   fish oil-omega-3 fatty acids 1000 MG capsule Take 1 g by mouth 2 times daily  Yes Historical Provider, MD   Multiple Vitamins-Minerals (CENTRUM SILVER PO) Take  by mouth daily. Yes Historical Provider, MD   aspirin 81 MG EC tablet Take 81 mg by mouth daily.    Yes Historical Provider, MD        No Known Allergies    Past Medical History:   Diagnosis Date    Acid reflux     Anxiety     Depression     Hyperlipidemia     controlled with meds    Ulcer, esophagus        Past Surgical History:   Procedure Laterality Date    BREAST LUMPECTOMY      RIGHT BREAST X 3 (BENIGN)    BUNIONECTOMY Left     BUNIONECTOMY Right     COLONOSCOPY      COSMETIC SURGERY      face lift    FOOT SURGERY      SCREWS PLACED IN RIGHT FOOT    HERNIA REPAIR Left 2019    LEFT INGUINAL HERNIA REPAIR WITH MESH and ON Q PUMP performed by Sushant León MD at [facility] ARTHROSCOPY Right 2017    TUBAL LIGATION  1985       Social History     Socioeconomic History    Marital status:      Spouse name: Not on file    Number of children: Not on file    Years of education: Not on file    Highest education level: Not on file   Occupational History    Not on file   Tobacco Use    Smoking status: Former     Packs/day: 1.00     Years: 10.00     Pack years: 10.00     Types: Cigarettes     Quit date: 1998     Years since quittin.0    Smokeless tobacco: Never   Vaping Use    Vaping Use: Never used   Substance and Sexual Activity Alcohol use: Yes     Comment: once a week    Drug use: No    Sexual activity: Not Currently   Other Topics Concern    Not on file   Social History Narrative    Not on file     Social Determinants of Health     Financial Resource Strain: Low Risk     Difficulty of Paying Living Expenses: Not hard at all   Food Insecurity: No Food Insecurity    Worried About Running Out of Food in the Last Year: Never true    Ran Out of Food in the Last Year: Never true   Transportation Needs: Not on file   Physical Activity: Insufficiently Active    Days of Exercise per Week: 3 days    Minutes of Exercise per Session: 30 min   Stress: Not on file   Social Connections: Not on file   Intimate Partner Violence: Not At Risk    Fear of Current or Ex-Partner: No    Emotionally Abused: No    Physically Abused: No    Sexually Abused: No   Housing Stability: Not on file        Family History   Problem Relation Age of Onset    Diabetes Mother     Hypertension Mother     Heart Disease Mother     Stroke Father     Hypertension Father     Heart Disease Father         mi    Cancer Sister 62        BREAST    Breast Cancer Sister     Heart Disease Brother     Diabetes Brother        ADVANCE DIRECTIVE: N, <no information>    Vitals:    09/26/22 1521   Temp: 98.1 °F (36.7 °C)   TempSrc: Temporal   Weight: 124 lb 3.2 oz (56.3 kg)   Height: 5' 3\" (1.6 m)     Estimated body mass index is 22 kg/m² as calculated from the following:    Height as of this encounter: 5' 3\" (1.6 m). Weight as of this encounter: 124 lb 3.2 oz (56.3 kg). Physical Exam  Vitals and nursing note reviewed. Constitutional:       General: She is not in acute distress. Appearance: Normal appearance. She is not ill-appearing or diaphoretic. HENT:      Head: Normocephalic and atraumatic.       Right Ear: Tympanic membrane and ear canal normal.      Left Ear: Tympanic membrane and ear canal normal.      Nose:      Comments: Nasal septal deviation  Eyes:      General: No scleral icterus. Right eye: No discharge. Left eye: No discharge. Extraocular Movements: Extraocular movements intact. Pupils: Pupils are equal, round, and reactive to light. Cardiovascular:      Rate and Rhythm: Normal rate and regular rhythm. Heart sounds: No murmur heard. Pulmonary:      Effort: No respiratory distress. Breath sounds: No wheezing or rhonchi. Abdominal:      General: There is no distension. Palpations: There is no mass. Tenderness: There is no abdominal tenderness. There is no guarding or rebound. Musculoskeletal:         General: No swelling. Skin:     Coloration: Skin is not jaundiced. Findings: No rash. Neurological:      General: No focal deficit present. Mental Status: She is alert and oriented to person, place, and time. Cranial Nerves: No cranial nerve deficit. Motor: No weakness. Psychiatric:         Mood and Affect: Mood normal.         Behavior: Behavior normal.         Thought Content: Thought content normal.       No flowsheet data found.     Lab Results   Component Value Date/Time    CHOL 137 11/05/2021 08:38 AM    CHOL 133 03/25/2021 09:57 AM    CHOL 121 08/20/2020 08:17 AM    TRIG 75 11/05/2021 08:38 AM    TRIG 52 03/25/2021 09:57 AM    TRIG 66 08/20/2020 08:17 AM    HDL 69 11/05/2021 08:38 AM    HDL 69 03/25/2021 09:57 AM    HDL 58 08/20/2020 08:17 AM    HDL 68 01/20/2012 09:22 AM    LDLCALC 53 11/05/2021 08:38 AM    LDLCALC 54 03/25/2021 09:57 AM    LDLCALC 50 08/20/2020 08:17 AM    GLUCOSE 103 04/01/2019 09:55 AM    LABA1C 5.5 08/20/2020 08:17 AM    LABA1C 5.2 04/03/2018 09:58 AM       The 10-year ASCVD risk score (Robin GRAY, et al., 2019) is: 6.3%    Values used to calculate the score:      Age: 75 years      Sex: Female      Is Non- : No      Diabetic: No      Tobacco smoker: No      Systolic Blood Pressure: 164 mmHg      Is BP treated: No      HDL Cholesterol: 69 mg/dL Total Cholesterol: 137 mg/dL    Immunization History   Administered Date(s) Administered    COVID-19, PFIZER PURPLE top, DILUTE for use, (age 15 y+), 30mcg/0.3mL 03/19/2021, 04/09/2021, 12/19/2021    Influenza Vaccine, unspecified formulation 10/31/2017    Influenza Virus Vaccine 11/01/2014, 10/01/2015, 10/28/2016    Influenza Whole 11/01/2012, 11/01/2014, 10/01/2015, 10/28/2016    Influenza, FLUARIX, FLULAVAL, FLUZONE (age 10 mo+) AND AFLURIA, (age 1 y+), PF, 0.5mL 10/10/2018    Influenza, FLUBLOK, (age 25 y+), PF, 0.5mL 10/07/2020    Influenza, FLUZONE (age 72 y+), High Dose, 0.7mL 10/19/2021    Influenza, High Dose (Fluzone 65 yrs and older) 10/02/2019    Pneumococcal Polysaccharide ([id number]) 03/04/2016, 05/07/2021    Tdap (Boostrix, Adacel) 06/26/2007, 03/04/2016, 02/07/2021    Zoster Live (Zostavax) 04/23/2015       Health Maintenance   Topic Date Due    Shingles vaccine (2 of 3) 06/18/2015    Colorectal Cancer Screen  03/17/2019    COVID-19 Vaccine (4 - Booster for Pfizer series) 04/19/2022    Pneumococcal 65+ years Vaccine (2 - PCV) 05/07/2022    Flu vaccine (1) 08/01/2022    Lipids  11/05/2022    Annual Wellness Visit (AWV)  11/06/2022    Breast cancer screen  07/29/2023    Depression Monitoring  08/19/2023    DTaP/Tdap/Td vaccine (4 - Td or Tdap) 02/07/2031    DEXA (modify frequency per FRAX score)  Completed    Hepatitis C screen  Completed    Hepatitis A vaccine  Aged Out    Hepatitis B vaccine  Aged Out    Hib vaccine  Aged Out    Meningococcal (ACWY) vaccine  Aged Out       Assessment & Plan    Diagnosis Orders   1. Nasal septal deviation  EKG 12 Lead - Clinic Performed      2. Hypercholesterolemia        3. Gastroesophageal reflux disease, unspecified whether esophagitis present        4.  Anxiety           Chief Complaint   Patient presents with    Pre-op Exam     SEPTOPLASTY, INFERIOR TURBINATE REDUCTION with Dr. Gianfranco Kc on 10/11/2022    EKG is pending         --Geralyn Najjar, DO

## 2022-09-27 LAB
EKG ATRIAL RATE: 54 BPM
EKG DIAGNOSIS: NORMAL
EKG P AXIS: 71 DEGREES
EKG P-R INTERVAL: 134 MS
EKG Q-T INTERVAL: 442 MS
EKG QRS DURATION: 88 MS
EKG QTC CALCULATION (BAZETT): 419 MS
EKG R AXIS: 68 DEGREES
EKG T AXIS: 64 DEGREES
EKG VENTRICULAR RATE: 54 BPM

## 2022-09-27 PROCEDURE — 93010 ELECTROCARDIOGRAM REPORT: CPT | Performed by: INTERNAL MEDICINE

## 2022-10-10 ENCOUNTER — ANESTHESIA EVENT (OUTPATIENT)
Dept: OPERATING ROOM | Age: 67
End: 2022-10-10
Payer: MEDICARE

## 2022-10-10 NOTE — FLOWSHEET NOTE
Preoperative Screening for Elective Surgery/Invasive Procedures While COVID-19 present in the community     Have you tested positive or have been told to self-isolate for COVID-19 like symptoms within the past 28 days? Do you currently have any of the following symptoms? Fever >100.0 F or 99.9 F in immunocompromised patients? New onset cough, shortness of breath or difficulty breathing? New onset sore throat, myalgia (muscle aches and pains), headache, loss of taste/smell or diarrhea? Have you had a potential exposure to COVID-19 within the past 14 days by:  Close contact with a confirmed case? Close contact with a healthcare worker,  or essential infrastructure worker (grocery store, [employer], gas station, public utilities or transportation)? Do you reside in a congregate setting such as; skilled nursing facility, adult home, correctional facility, homeless shelter or other institutional setting? Have you had recent travel to a known COVID-19 hotspot? * Admitted with diarrhea? [] YES    [x]  NO     *Prior history of C-Diff. In last 3 months? [] YES    [x]  NO     *Antibiotic use in the past 6-8 weeks? [x]  NO    []  YES      If yes, which: REASON_________________     *Prior hospitalization or nursing home in the last month? []  YES    [x]  NO     SAFETY FIRST. .call before you fall    [address] [facility] time___10/11/22 1115_________        Surgery time____________  6465  Do not eat or drink anything after 12:00 midnight prior to your surgery. This includes water chewing gum, mints and ice chips- the Day of Surgery. You may brush your teeth and gargle the morning of your surgery, but do not swallow the water     Please see your family doctor/pediatrician for a history and physical and/or questions concerning medications. Bring any test results/reports from your physicians office.    If you are under the care of a heart doctor or specialist doctor, please be aware that you may be asked to them for clearance    You may be asked to stop blood thinners such as Coumadin, Plavix, Fragmin, Lovenox, etc., or any anti-inflammatories such as:  Aspirin, Ibuprofen, Advil, Naproxen prior to your surgery. We also ask that you stop any OTC medications such as fish oil, vitamin E, glucosamine, garlic, Multivitamins, COQ 10, etc.    We ask that you do not smoke 24 hours prior to surgery  We ask that you do not  drink any alcoholic beverages 24 hours prior to surgery     You must make arrangements for a responsible adult to take you home after your surgery. For your safety you will not be allowed to leave alone or drive yourself home. Your surgery will be cancelled if you do not have a ride home. Also for your safety, it is strongly suggested that someone stay with you the first 24 hours after your surgery. A parent or legal guardian must accompany a child scheduled for surgery and plan to stay at the hospital until the child is discharged. Please do not bring other children with you. For your comfort, please wear simple loose fitting clothing to the hospital.  Please do not bring valuables. Do not wear any make-up or nail polish on your fingers or toes. For your safety, please do not wear any jewelry or body piercing's on the day of surgery. All jewelry must be removed. If you have dentures, they will be removed before going to operating room. For your convenience, we will provide you with a container. If you wear contact lenses or glasses, they will be removed, please bring a case for them. If you have a living will and a durable power of  for healthcare, please bring in a copy. As part of our patient safety program to minimize surgical site infections, we ask you to do the following:    Please notify your surgeon if you develop any illness between         now and the day of your surgery. This includes a cough, cold, fever, sore throat, nausea,         or vomiting, and diarrhea, etc.   Please notify your surgeon if you experience dizziness, shortness         of breath or blurred vision between now and the time of your surgery. Do not shave your operative site 96 hours prior to surgery. For face and neck surgery, men may use an electric razor 48 hours   prior to surgery. You may shower the night before surgery or the morning of   your surgery with an antibacterial soap. You will need to bring a photo ID and insurance card     If you use a C-pap or Bi-pap machine, please bring your machine with you to the hospital     Our goal is to provide you with excellent care, therefore, visitors will be limited to so that we may focus on providing this care for you. Please contact your surgeon office, if you have any further questions. [facility] phone number:  [phone] [facility] fax number:  [phone]    Please note these are generalized instructions for all surgical cases, you may be provided with more specific instructions according to your surgery.

## 2022-10-11 ENCOUNTER — ANESTHESIA (OUTPATIENT)
Dept: OPERATING ROOM | Age: 67
End: 2022-10-11
Payer: MEDICARE

## 2022-10-11 ENCOUNTER — HOSPITAL ENCOUNTER (OUTPATIENT)
Age: 67
Setting detail: OUTPATIENT SURGERY
Discharge: HOME | End: 2022-10-11
Attending: OTOLARYNGOLOGY | Admitting: OTOLARYNGOLOGY
Payer: MEDICARE

## 2022-10-11 VITALS
OXYGEN SATURATION: 93 % | HEART RATE: 63 BPM | DIASTOLIC BLOOD PRESSURE: 72 MMHG | TEMPERATURE: 97.3 F | RESPIRATION RATE: 18 BRPM | BODY MASS INDEX: 22.17 KG/M2 | WEIGHT: 125.11 LBS | SYSTOLIC BLOOD PRESSURE: 143 MMHG | HEIGHT: 63 IN

## 2022-10-11 DIAGNOSIS — R09.81 NASAL CONGESTION: Primary | ICD-10-CM

## 2022-10-11 PROCEDURE — 7100000011 HC PHASE II RECOVERY - ADDTL 15 MIN: Performed by: OTOLARYNGOLOGY

## 2022-10-11 PROCEDURE — 3700000001 HC ADD 15 MINUTES (ANESTHESIA): Performed by: OTOLARYNGOLOGY

## 2022-10-11 PROCEDURE — 7100000000 HC PACU RECOVERY - FIRST 15 MIN: Performed by: OTOLARYNGOLOGY

## 2022-10-11 PROCEDURE — 6360000002 HC RX W HCPCS: Performed by: NURSE ANESTHETIST, CERTIFIED REGISTERED

## 2022-10-11 PROCEDURE — 7100000010 HC PHASE II RECOVERY - FIRST 15 MIN: Performed by: OTOLARYNGOLOGY

## 2022-10-11 PROCEDURE — 2720000010 HC SURG SUPPLY STERILE: Performed by: OTOLARYNGOLOGY

## 2022-10-11 PROCEDURE — 2580000003 HC RX 258: Performed by: ANESTHESIOLOGY

## 2022-10-11 PROCEDURE — 2500000003 HC RX 250 WO HCPCS: Performed by: OTOLARYNGOLOGY

## 2022-10-11 PROCEDURE — C1776 JOINT DEVICE (IMPLANTABLE): HCPCS | Performed by: OTOLARYNGOLOGY

## 2022-10-11 PROCEDURE — 3700000000 HC ANESTHESIA ATTENDED CARE: Performed by: OTOLARYNGOLOGY

## 2022-10-11 PROCEDURE — 6370000000 HC RX 637 (ALT 250 FOR IP): Performed by: OTOLARYNGOLOGY

## 2022-10-11 PROCEDURE — 2500000003 HC RX 250 WO HCPCS: Performed by: NURSE ANESTHETIST, CERTIFIED REGISTERED

## 2022-10-11 PROCEDURE — 30520 REPAIR OF NASAL SEPTUM: CPT | Performed by: OTOLARYNGOLOGY

## 2022-10-11 PROCEDURE — 3600000012 HC SURGERY LEVEL 2 ADDTL 15MIN: Performed by: OTOLARYNGOLOGY

## 2022-10-11 PROCEDURE — 7100000001 HC PACU RECOVERY - ADDTL 15 MIN: Performed by: OTOLARYNGOLOGY

## 2022-10-11 PROCEDURE — 3600000002 HC SURGERY LEVEL 2 BASE: Performed by: OTOLARYNGOLOGY

## 2022-10-11 PROCEDURE — 30140 RESECT INFERIOR TURBINATE: CPT | Performed by: OTOLARYNGOLOGY

## 2022-10-11 PROCEDURE — 2709999900 HC NON-CHARGEABLE SUPPLY: Performed by: OTOLARYNGOLOGY

## 2022-10-11 DEVICE — SHEET 1532530 10PK SILICONE 5X5CM 1.57MM: Type: IMPLANTABLE DEVICE | Site: NOSE | Status: FUNCTIONAL

## 2022-10-11 RX ORDER — MIDAZOLAM HYDROCHLORIDE 1 MG/ML
INJECTION INTRAMUSCULAR; INTRAVENOUS PRN
Status: DISCONTINUED | OUTPATIENT
Start: 2022-10-11 | End: 2022-10-11 | Stop reason: SDUPTHER

## 2022-10-11 RX ORDER — LIDOCAINE HYDROCHLORIDE 20 MG/ML
INJECTION, SOLUTION EPIDURAL; INFILTRATION; INTRACAUDAL; PERINEURAL PRN
Status: DISCONTINUED | OUTPATIENT
Start: 2022-10-11 | End: 2022-10-11 | Stop reason: SDUPTHER

## 2022-10-11 RX ORDER — FENTANYL CITRATE 50 UG/ML
INJECTION, SOLUTION INTRAMUSCULAR; INTRAVENOUS PRN
Status: DISCONTINUED | OUTPATIENT
Start: 2022-10-11 | End: 2022-10-11 | Stop reason: SDUPTHER

## 2022-10-11 RX ORDER — SODIUM CHLORIDE 0.9 % (FLUSH) 0.9 %
5-40 SYRINGE (ML) INJECTION PRN
Status: DISCONTINUED | OUTPATIENT
Start: 2022-10-11 | End: 2022-10-11 | Stop reason: HOSPADM

## 2022-10-11 RX ORDER — SODIUM CHLORIDE 0.9 % (FLUSH) 0.9 %
5-40 SYRINGE (ML) INJECTION EVERY 12 HOURS SCHEDULED
Status: DISCONTINUED | OUTPATIENT
Start: 2022-10-11 | End: 2022-10-11 | Stop reason: HOSPADM

## 2022-10-11 RX ORDER — HYDROCODONE BITARTRATE AND ACETAMINOPHEN 5; 325 MG/1; MG/1
1 TABLET ORAL EVERY 6 HOURS PRN
Qty: 20 TABLET | Refills: 0 | Status: SHIPPED | OUTPATIENT
Start: 2022-10-11 | End: 2022-10-16

## 2022-10-11 RX ORDER — LIDOCAINE HYDROCHLORIDE AND EPINEPHRINE BITARTRATE 20; .01 MG/ML; MG/ML
INJECTION, SOLUTION SUBCUTANEOUS
Status: COMPLETED | OUTPATIENT
Start: 2022-10-11 | End: 2022-10-11

## 2022-10-11 RX ORDER — ONDANSETRON 2 MG/ML
INJECTION INTRAMUSCULAR; INTRAVENOUS PRN
Status: DISCONTINUED | OUTPATIENT
Start: 2022-10-11 | End: 2022-10-11 | Stop reason: SDUPTHER

## 2022-10-11 RX ORDER — OXYMETAZOLINE HYDROCHLORIDE 0.05 G/100ML
SPRAY NASAL
Status: COMPLETED | OUTPATIENT
Start: 2022-10-11 | End: 2022-10-11

## 2022-10-11 RX ORDER — AMOXICILLIN AND CLAVULANATE POTASSIUM 875; 125 MG/1; MG/1
1 TABLET, FILM COATED ORAL 2 TIMES DAILY
Qty: 14 TABLET | Refills: 0 | Status: SHIPPED | OUTPATIENT
Start: 2022-10-11 | End: 2022-10-18

## 2022-10-11 RX ORDER — SODIUM CHLORIDE 9 MG/ML
INJECTION, SOLUTION INTRAVENOUS PRN
Status: DISCONTINUED | OUTPATIENT
Start: 2022-10-11 | End: 2022-10-11 | Stop reason: HOSPADM

## 2022-10-11 RX ORDER — DEXAMETHASONE SODIUM PHOSPHATE 4 MG/ML
INJECTION, SOLUTION INTRA-ARTICULAR; INTRALESIONAL; INTRAMUSCULAR; INTRAVENOUS; SOFT TISSUE PRN
Status: DISCONTINUED | OUTPATIENT
Start: 2022-10-11 | End: 2022-10-11 | Stop reason: SDUPTHER

## 2022-10-11 RX ORDER — ROCURONIUM BROMIDE 10 MG/ML
INJECTION, SOLUTION INTRAVENOUS PRN
Status: DISCONTINUED | OUTPATIENT
Start: 2022-10-11 | End: 2022-10-11 | Stop reason: SDUPTHER

## 2022-10-11 RX ORDER — EPHEDRINE SULFATE/0.9% NACL/PF 50 MG/5 ML
SYRINGE (ML) INTRAVENOUS PRN
Status: DISCONTINUED | OUTPATIENT
Start: 2022-10-11 | End: 2022-10-11 | Stop reason: SDUPTHER

## 2022-10-11 RX ORDER — GLYCOPYRROLATE 0.2 MG/ML
INJECTION INTRAMUSCULAR; INTRAVENOUS PRN
Status: DISCONTINUED | OUTPATIENT
Start: 2022-10-11 | End: 2022-10-11 | Stop reason: SDUPTHER

## 2022-10-11 RX ORDER — ONDANSETRON 2 MG/ML
4 INJECTION INTRAMUSCULAR; INTRAVENOUS
Status: DISCONTINUED | OUTPATIENT
Start: 2022-10-11 | End: 2022-10-11 | Stop reason: HOSPADM

## 2022-10-11 RX ORDER — FENTANYL CITRATE 50 UG/ML
25 INJECTION, SOLUTION INTRAMUSCULAR; INTRAVENOUS EVERY 5 MIN PRN
Status: DISCONTINUED | OUTPATIENT
Start: 2022-10-11 | End: 2022-10-11 | Stop reason: HOSPADM

## 2022-10-11 RX ORDER — SODIUM CHLORIDE 9 MG/ML
INJECTION, SOLUTION INTRAVENOUS CONTINUOUS
Status: DISCONTINUED | OUTPATIENT
Start: 2022-10-11 | End: 2022-10-11 | Stop reason: HOSPADM

## 2022-10-11 RX ORDER — PROPOFOL 10 MG/ML
INJECTION, EMULSION INTRAVENOUS PRN
Status: DISCONTINUED | OUTPATIENT
Start: 2022-10-11 | End: 2022-10-11 | Stop reason: SDUPTHER

## 2022-10-11 RX ADMIN — MIDAZOLAM 2 MG: 1 INJECTION INTRAMUSCULAR; INTRAVENOUS at 12:19

## 2022-10-11 RX ADMIN — LIDOCAINE HYDROCHLORIDE 100 MG: 20 INJECTION, SOLUTION EPIDURAL; INFILTRATION; INTRACAUDAL; PERINEURAL at 12:23

## 2022-10-11 RX ADMIN — SODIUM CHLORIDE: 9 INJECTION, SOLUTION INTRAVENOUS at 10:25

## 2022-10-11 RX ADMIN — FENTANYL CITRATE 100 MCG: 50 INJECTION INTRAMUSCULAR; INTRAVENOUS at 12:20

## 2022-10-11 RX ADMIN — ROCURONIUM BROMIDE 50 MG: 10 INJECTION INTRAVENOUS at 12:23

## 2022-10-11 RX ADMIN — GLYCOPYRROLATE 0.2 MG: 0.2 INJECTION, SOLUTION INTRAMUSCULAR; INTRAVENOUS at 12:37

## 2022-10-11 RX ADMIN — SUGAMMADEX 200 MG: 100 INJECTION, SOLUTION INTRAVENOUS at 13:21

## 2022-10-11 RX ADMIN — ONDANSETRON 4 MG: 2 INJECTION INTRAMUSCULAR; INTRAVENOUS at 13:15

## 2022-10-11 RX ADMIN — DEXAMETHASONE SODIUM PHOSPHATE 8 MG: 4 INJECTION, SOLUTION INTRAMUSCULAR; INTRAVENOUS at 12:48

## 2022-10-11 RX ADMIN — PROPOFOL 200 MG: 10 INJECTION, EMULSION INTRAVENOUS at 12:23

## 2022-10-11 RX ADMIN — Medication 15 MG: at 12:38

## 2022-10-11 RX ADMIN — Medication 15 MG: at 12:49

## 2022-10-11 ASSESSMENT — PAIN DESCRIPTION - DESCRIPTORS
DESCRIPTORS: DISCOMFORT
DESCRIPTORS: BURNING
DESCRIPTORS: DISCOMFORT;BURNING

## 2022-10-11 ASSESSMENT — PAIN DESCRIPTION - PAIN TYPE
TYPE: SURGICAL PAIN

## 2022-10-11 ASSESSMENT — PAIN - FUNCTIONAL ASSESSMENT
PAIN_FUNCTIONAL_ASSESSMENT: 0-10
PAIN_FUNCTIONAL_ASSESSMENT: PREVENTS OR INTERFERES SOME ACTIVE ACTIVITIES AND ADLS
PAIN_FUNCTIONAL_ASSESSMENT: PREVENTS OR INTERFERES SOME ACTIVE ACTIVITIES AND ADLS

## 2022-10-11 ASSESSMENT — PAIN DESCRIPTION - ONSET
ONSET: ON-GOING

## 2022-10-11 ASSESSMENT — LIFESTYLE VARIABLES: SMOKING_STATUS: 0

## 2022-10-11 ASSESSMENT — PAIN DESCRIPTION - FREQUENCY
FREQUENCY: INTERMITTENT
FREQUENCY: INTERMITTENT
FREQUENCY: CONTINUOUS

## 2022-10-11 ASSESSMENT — PAIN SCALES - GENERAL
PAINLEVEL_OUTOF10: 1
PAINLEVEL_OUTOF10: 1
PAINLEVEL_OUTOF10: 2

## 2022-10-11 ASSESSMENT — PAIN DESCRIPTION - LOCATION
LOCATION: NOSE

## 2022-10-11 ASSESSMENT — ENCOUNTER SYMPTOMS: SHORTNESS OF BREATH: 0

## 2022-10-11 NOTE — OP NOTE
[address] [place] [address]  OPERATIVE REPORT    Patient Name: Ashli Cheng  YOB: 1955  Medical Record Number:  [id number]  Billing Number:  [id number]  Date of Procedure: [unfilled]  Time: 3722    Pre Operative Diagnoses:   Nasal congestion  Deviated septum  Inferior turbinate hypertrophy    Post Operative Diagnoses:    Same    Procedure:  septoplasty  Inferior turbinate reduction       Surgeon: Mariel Patino MD    OR Staff/ Assistant:  Circulator: Aditya Turner RN  Relief Circulator: Deric Powers RN  Scrub Person First: Ricardo Jackson  Circulator Assist: Mukund Tai    Anesthesia:  General anesthesia. Findings:  1) severe rightward septal deviation  Bilateral inferior turbinate hypertrophy    Indications: This is a 75 y.o. female with nasal congestion secondary to a deviated septum and inferior turbinate hypertrophy. Risks and benefits discussed with the patient including alternate treatment options, Informed consent was obtained, the patient elected to proceed with the planned procedure. DETAILS OF PROCEDURE(S):    The patient was brought to the operating room placed in supine position the operating table. She underwent uncomplicated general anesthesia with endotracheal intubation. The head of bed was turned 180 degrees. 6 mL of 1% lidocaine with epinephrine was injected into the septum and inferior turbinates. She was then prepped and draped. A left-sided hemitransfixion incision was made. The left mucoperichondrial flap was raised. An L-shaped incision was then made into the quadrangular cartilage leaving at least 1.5 cm anteriorly and dorsally. The contralateral mucoperichondrial flap was then raised. The quadrangular cartilage was disarticulated from the maxillary crest and vomer. This piece of the cartilage was removed. This revealed a bit of a thick maxillary crest.  This was sharply removed.   This resulted in a significantly straighter septum. Incision was made into the anterior aspect of each inferior turbinate. A submucosal pocket was made bilaterally. A submucosal reduction was then performed using a microdebrider. Some of the inferior turbinate bone was removed with the microdebrider on drill mode. The bilateral inferior turbinates were then outfractured. Next a quilting 4-0 plain gut suture was used to a reapposed the mucoperichondrial flaps. The hemitransfixion incision was closed with simple interrupted 4-0 chromic sutures. Silastic splints were placed on each side of the septum and secured with a 3-0 Prolene suture. A small piece of Surgicel was then placed over the bilateral turbinate incisions. The head of bed was then rotated back 180 degrees. The patient was allowed to awaken, extubated and taken to recovery in stable condition    I attest that I was present for and did the entire procedure myself. Estimated Blood Loss: 25 mL                      Complications: There were no complications.     Sima Davis MD

## 2022-10-11 NOTE — DISCHARGE INSTRUCTIONS
Discharge Instructions    Steps to [address] [facility] [place]   While your sinuses are healing:   Do not blow your nose until your doctor  You may have bloody discharge from your nose. Create a drip pad using rolls of gauze. Hold the roll under your nose to soak up any discharge. Avoid air pollutants like dust and smoke. Physical Activity   During your recovery:   Light activity (no lifting more than 15 pound) for 1 week  Medications   Dr. Pb Butterfield   Antibiotic while splints are in place  Use nasal saline in each side of nose 4 times a day    Follow these general medication guidelines:   Take your medication as directed. Do not change the amount or schedule. Do not share your medication with anyone. Medications can be dangerous when mixed. Talk to your doctor if you are taking more than one medication, including over-the-counter products and supplements. If you had to stop medications before surgery, ask your doctor when you can start again. Follow-up  1 week for splint removal  Call Your Doctor If Any of the Following Occur ([phone])  Contact your doctor if your recovery is not progressing as expected or you develop complications, such as:  Signs of infection, including fever and chills  Pain that you cannot control with the medications that you have been given  Redness, swelling, increasing pain, excessive bleeding, or discharge from the nose  Lightheadedness  Nausea and vomiting  Vomiting blood or material that looks like coffee grounds  Bruising around the eye(s)  Swelling of the eye(s)  Changes in vision  A headache that lasts longer than 2 days after surgery    If you think you have an emergency, call for medical help right away.

## 2022-10-11 NOTE — ANESTHESIA PRE PROCEDURE
Department of Anesthesiology  Preprocedure Note       Name:  Jennifer Cool   Age:  75 y.o.  :  1955                                          MRN:  [id number]         Date:  10/11/2022      Surgeon: Deshaun Vu):  Miguel Li MD    Procedure: Procedure(s):  SEPTOPLASTY, INFERIOR TURBINATE REDUCTION    Medications prior to admission:   Prior to Admission medications    Medication Sig Start Date End Date Taking? Authorizing Provider   fluticasone (FLONASE) 50 MCG/ACT nasal spray 1 spray by Each Nostril route daily 22   Miguel Li MD   citalopram (CELEXA) 20 MG tablet TAKE ONE TABLET BY MOUTH DAILY 22   Bruce Backer, DO   atorvastatin (LIPITOR) 40 MG tablet TAKE ONE TABLET BY MOUTH EVERY NIGHT AT BEDTIME 22   Bruce Backer, DO   omeprazole (PRILOSEC) 20 MG delayed release capsule TAKE ONE CAPSULE BY MOUTH DAILY 22   Bruce Backer, DO   Calcium Carbonate-Vitamin D (CALTRATE 600+D PO) Take 1 tablet by mouth 2 times daily    Historical Provider, MD   Biotin 1 MG CAPS Take  by mouth daily. Historical Provider, MD   fish oil-omega-3 fatty acids 1000 MG capsule Take 1 g by mouth 2 times daily     Historical Provider, MD   Multiple Vitamins-Minerals (CENTRUM SILVER PO) Take  by mouth daily. Historical Provider, MD   aspirin 81 MG EC tablet Take 81 mg by mouth daily.       Historical Provider, MD       Current medications:    Current Facility-Administered Medications   Medication Dose Route Frequency Provider Last Rate Last Admin    0.9 % sodium chloride infusion   IntraVENous Continuous Laurita Townsend  mL/hr at 10/11/22 1025 New Bag at 10/11/22 1025    sodium chloride flush 0.9 % injection 5-40 mL  5-40 mL IntraVENous 2 times per day Laurita Townsend MD        sodium chloride flush 0.9 % injection 5-40 mL  5-40 mL IntraVENous PRN Laurita Townsend MD        0.9 % sodium chloride infusion   IntraVENous PRN Laurita Townsend MD           Allergies:  No Known Allergies    Problem List:    Patient Active Problem List   Diagnosis Code    Ulcer, esophagus K22.10    Depression F32. A    Anxiety F41.9    GERD (gastroesophageal reflux disease) K21.9    Dysphagia R13.10    Hypercholesterolemia E78.00    Complete tear of right rotator cuff M75.121    Left inguinal hernia K40.90       Past Medical History:        Diagnosis Date    Acid reflux     Anxiety     Depression     Deviated septum 10/2022    \"severe on right side\"    Hyperlipidemia     controlled with meds    Ulcer, esophagus 2022    healed with meds       Past Surgical History:        Procedure Laterality Date    BREAST LUMPECTOMY      RIGHT BREAST X 3 (BENIGN)    BUNIONECTOMY Left     BUNIONECTOMY Right     COLONOSCOPY      COSMETIC SURGERY  2014    face lift    FOOT SURGERY      SCREWS PLACED IN RIGHT FOOT    HERNIA REPAIR Left 2019    LEFT INGUINAL HERNIA REPAIR WITH MESH and ON Q PUMP performed by Yue Germain MD at [facility] ARTHROSCOPY Right 2017    TUBAL LIGATION  1985       Social History:    Social History     Tobacco Use    Smoking status: Former     Packs/day: 1.00     Years: 10.00     Pack years: 10.00     Types: Cigarettes     Quit date: 1998     Years since quittin.0    Smokeless tobacco: Never   Substance Use Topics    Alcohol use: Yes     Comment: once a week                                Counseling given: Not Answered      Vital Signs (Current):   Vitals:    10/04/22 1557 10/11/22 1014 10/11/22 1018   BP:   (!) 150/70   Pulse:   (!) 49   Resp:   18   Temp:   97.7 °F (36.5 °C)   TempSrc:   Temporal   SpO2:   95%   Weight: 125 lb (56.7 kg) 125 lb 1.8 oz (56.8 kg)    Height: 5' 3\" (1.6 m)                                                BP Readings from Last 3 Encounters:   10/11/22 (!) 150/70   22 116/68   22 129/79       NPO Status: Time of last liquid consumption: 2300                        Time of last solid consumption: 2300                        Date of last liquid consumption: 10/10/22                        Date of last solid food consumption: 10/10/22    BMI:   Wt Readings from Last 3 Encounters:   10/11/22 125 lb 1.8 oz (56.8 kg)   09/26/22 124 lb 3.2 oz (56.3 kg)   09/26/22 122 lb (55.3 kg)     Body mass index is 22.16 kg/m². CBC:   Lab Results   Component Value Date/Time    WBC 7.1 08/04/2017 09:09 AM    RBC 4.16 08/04/2017 09:09 AM    HGB 13.9 08/04/2017 09:09 AM    HCT 41.2 08/04/2017 09:09 AM    MCV 99.1 08/04/2017 09:09 AM    RDW 13.1 08/04/2017 09:09 AM     08/04/2017 09:09 AM       CMP:   Lab Results   Component Value Date/Time     04/01/2019 09:55 AM    K 4.3 04/01/2019 09:55 AM     04/01/2019 09:55 AM    CO2 27 04/01/2019 09:55 AM    BUN 7 04/01/2019 09:55 AM    CREATININE <0.5 04/01/2019 09:55 AM    GFRAA >60 04/01/2019 09:55 AM    GFRAA >60 01/25/2013 09:27 AM    AGRATIO 1.2 04/01/2019 09:55 AM    LABGLOM >60 04/01/2019 09:55 AM    GLUCOSE 103 04/01/2019 09:55 AM    PROT 7.4 04/01/2019 09:55 AM    CALCIUM 9.4 04/01/2019 09:55 AM    BILITOT 0.6 04/01/2019 09:55 AM    ALKPHOS 82 04/01/2019 09:55 AM    AST 23 11/05/2021 08:38 AM    ALT 16 11/05/2021 08:38 AM       POC Tests: No results for input(s): POCGLU, POCNA, POCK, POCCL, POCBUN, POCHEMO, POCHCT in the last 72 hours.     Coags: No results found for: PROTIME, INR, APTT    HCG (If Applicable): No results found for: PREGTESTUR, PREGSERUM, HCG, HCGQUANT     ABGs: No results found for: PHART, PO2ART, XIE9BRZ, GIM8AXK, BEART, B9ODYGLD     Type & Screen (If Applicable):  No results found for: LABABO, LABRH    Drug/Infectious Status (If Applicable):  No results found for: HIV, HEPCAB    COVID-19 Screening (If Applicable):   Lab Results   Component Value Date/Time    COVID19 NOT DETECTED 12/07/2020 10:42 AM           Anesthesia Evaluation  Patient summary reviewed no history of anesthetic complications:   Airway: Mallampati: II  TM distance: >3 FB   Neck ROM: full  Mouth opening: > = 3 FB   Dental:    (+) partials      Pulmonary: breath sounds clear to auscultation      (-) asthma, shortness of breath, recent URI, sleep apnea and not a current smoker                           Cardiovascular:    (+) hyperlipidemia    (-) hypertension, valvular problems/murmurs, past MI, CAD, CABG/stent, dysrhythmias,  angina and no pulmonary hypertension      Rhythm: regular  Rate: normal                    Neuro/Psych:   (+) psychiatric history:depression/anxiety    (-) seizures, TIA and CVA           GI/Hepatic/Renal:   (+) GERD: well controlled,      (-) hepatitis, liver disease, no renal disease and bowel prep       Endo/Other:        (-) diabetes mellitus, hypothyroidism, hyperthyroidism, blood dyscrasia               Abdominal:             Vascular: Other Findings:           Anesthesia Plan      general     ASA 2       Induction: intravenous. MIPS: Postoperative opioids intended and Prophylactic antiemetics administered. Anesthetic plan and risks discussed with patient and child/children. Plan discussed with CRNA. This pre-anesthesia assessment may be used as a history and physical.    DOS STAFF ADDENDUM:    Pt seen and examined, chart reviewed (including anesthesia, drug and allergy history). No interval changes to history and physical examination. Anesthetic plan, risks, benefits, alternatives, and personnel involved discussed with patient. Patient verbalized an understanding and agrees to proceed.       Carley Hawkins MD  October 11, 2022  10:34 AM        Carley Hawkins MD   10/11/2022

## 2022-10-11 NOTE — PROGRESS NOTES
Patient arrived to phase 2. Rates pain 1/10. Tolerable per patient. Dressing to nose, clean dry and intact.      Electronically signed by Reyne Boxer, RN on 10/11/2022 at 2:25 PM

## 2022-10-11 NOTE — ANESTHESIA POSTPROCEDURE EVALUATION
Department of Anesthesiology  Postprocedure Note    Patient: Gale Sensor  MRN: [id number]  YOB: 1955  Date of evaluation: 10/11/2022      Procedure Summary     Date: 10/11/22 Room / Location: [address]    Anesthesia Start: 1219 Anesthesia Stop: 9532    Procedure: SEPTOPLASTY, INFERIOR TURBINATE REDUCTION (Nose) Diagnosis:       Chronic pansinusitis      Anosmia      Nasal congestion      Deviated nasal septum      Hypertrophy of inferior nasal turbinate      (Anosmia, Chronic pansinusitis, Deviated nasal septum,Hypertrophy of inferior nasal turbinate)    Surgeons: Sima Davis MD Responsible Provider: Shelbie Westbrook MD    Anesthesia Type: general ASA Status: 2          Anesthesia Type: No value filed.     Kervin Phase I: Kervin Score: 10    Kervin Phase II: Kervin Score: 10      Anesthesia Post Evaluation    Patient location during evaluation: PACU  Level of consciousness: awake and alert  Airway patency: patent  Nausea & Vomiting: no nausea and no vomiting  Complications: no  Cardiovascular status: blood pressure returned to baseline  Respiratory status: acceptable  Hydration status: euvolemic  Comments: Postoperative Anesthesia Note    Name:    Gale Sensor  MRN:      [id number]    Patient Vitals in the past 12 hrs:  10/11/22 1430, BP:(!) 143/72, Pulse:63, Resp:18, SpO2:93 %  10/11/22 1424, BP:(!) 154/56, Temp:97.3 °F (36.3 °C), Temp src:Temporal, Pulse:65, Resp:18, SpO2:94 %  10/11/22 1414, Temp:97.8 °F (36.6 °C), Temp src:Temporal, Pulse:65, Resp:15, SpO2:95 %  10/11/22 1400, BP:(!) 144/71, Pulse:65, Resp:11, SpO2:97 %  10/11/22 1350, BP:118/70, Pulse:74, Resp:16, SpO2:90 %  10/11/22 1345, BP:125/68, Pulse:71, Resp:12, SpO2:95 %  10/11/22 1343, Pulse:78, Resp:14, SpO2:98 %  10/11/22 1335, BP:122/62, Pulse:74, Resp:14, SpO2:96 %  10/11/22 1330, BP:131/67, Temp:97.4 °F (36.3 °C), Temp src:Temporal, Pulse:86, Resp:16, SpO2:94 %  10/11/22 1018, BP:(!) 150/70, Temp:97.7 °F (36.5 °C), Temp src:Temporal, Pulse:(!) 49, Resp:18, SpO2:95 %  10/11/22 1014, Weight:125 lb 1.8 oz (56.8 kg)     LABS:    CBC  Lab Results       Component                Value               Date/Time                  WBC                      7.1                 08/04/2017 09:09 AM        HGB                      13.9                08/04/2017 09:09 AM        HCT                      41.2                08/04/2017 09:09 AM        PLT                      239                 08/04/2017 09:09 AM   RENAL  Lab Results       Component                Value               Date/Time                  NA                       143                 04/01/2019 09:55 AM        K                        4.3                 04/01/2019 09:55 AM        CL                       104                 04/01/2019 09:55 AM        CO2                      27                  04/01/2019 09:55 AM        BUN                      7                   04/01/2019 09:55 AM        CREATININE               <0.5 (L)            04/01/2019 09:55 AM        GLUCOSE                  103 (H)             04/01/2019 09:55 AM   COAGS  No results found for: PROTIME, INR, APTT    Intake & Output:  @24HRIO@    Nausea & Vomiting:  No    Level of Consciousness:  Awake    Pain Assessment:  Adequate analgesia    Anesthesia Complications:  No apparent anesthetic complications    SUMMARY      Vital signs stable  OK to discharge from Stage I post anesthesia care.   Care transferred from Anesthesiology department on discharge from perioperative area

## 2022-10-11 NOTE — H&P
I have reviewed this patient's history and physical.  There have been no significant changes. The patient understands the risk of a septoplasty and inferior turbinate reduction. She has agreed to proceed.

## 2022-10-11 NOTE — PROGRESS NOTES
Pt discharged to home. Transported with wheelchair. Accompanied by daughter. Transported in personal vehicle. Discharge instructions, Rx, and personal belongings given to pt. Explanation of discharge medications and instructions understood by verbal statement. No questions, comments or concerns at this time.        Electronically signed by Kay Pruett RN on 10/11/2022 at 3:04 PM

## 2022-10-12 DIAGNOSIS — R49.0 HOARSENESS: Primary | ICD-10-CM

## 2022-10-17 ENCOUNTER — OFFICE VISIT (OUTPATIENT)
Dept: ENT CLINIC | Age: 67
End: 2022-10-17

## 2022-10-17 VITALS — BODY MASS INDEX: 21.43 KG/M2 | WEIGHT: 121 LBS

## 2022-10-17 DIAGNOSIS — R09.81 NASAL CONGESTION: Primary | ICD-10-CM

## 2022-10-17 PROCEDURE — 99024 POSTOP FOLLOW-UP VISIT: CPT | Performed by: OTOLARYNGOLOGY

## 2022-10-17 NOTE — PROGRESS NOTES
Patient is following up from her septoplasty and inferior turbinate reduction that we did on October 11, 2022. Overall she is done okay. Exam  Bilateral Silastic splints removed. Afrin and lidocaine were applied to the nasal cavity. Debridement bilateral nasal cavity with a Martinez suction. Septum is significantly more midline. Well reduced turbinates. Everything appears to be healing well    Plan  Patient appears to be healing very well from surgery. Recommended 1 month follow-up.

## 2022-11-07 ENCOUNTER — PROCEDURE VISIT (OUTPATIENT)
Dept: SPEECH THERAPY | Age: 67
End: 2022-11-07
Payer: MEDICARE

## 2022-11-07 DIAGNOSIS — R49.0 HOARSENESS: ICD-10-CM

## 2022-11-07 DIAGNOSIS — J38.4 VOCAL CORD EDEMA: ICD-10-CM

## 2022-11-07 DIAGNOSIS — R49.0 DYSPHONIA: Primary | ICD-10-CM

## 2022-11-07 PROCEDURE — 92524 BEHAVRAL QUALIT ANALYS VOICE: CPT | Performed by: SPEECH-LANGUAGE PATHOLOGIST

## 2022-11-07 PROCEDURE — 31579 LARYNGOSCOPY TELESCOPIC: CPT | Performed by: SPEECH-LANGUAGE PATHOLOGIST

## 2022-11-07 PROCEDURE — 92507 TX SP LANG VOICE COMM INDIV: CPT | Performed by: SPEECH-LANGUAGE PATHOLOGIST

## 2022-11-07 RX ORDER — ATORVASTATIN CALCIUM 40 MG/1
TABLET, FILM COATED ORAL
Qty: 90 TABLET | Refills: 1 | Status: SHIPPED | OUTPATIENT
Start: 2022-11-07

## 2022-11-07 RX ORDER — CITALOPRAM 20 MG/1
TABLET ORAL
Qty: 90 TABLET | Refills: 1 | Status: SHIPPED | OUTPATIENT
Start: 2022-11-07

## 2022-11-07 RX ORDER — OMEPRAZOLE 20 MG/1
CAPSULE, DELAYED RELEASE ORAL
Qty: 90 CAPSULE | Refills: 1 | Status: SHIPPED | OUTPATIENT
Start: 2022-11-07

## 2022-11-07 NOTE — PROGRESS NOTES
[address]  ENT  Videostroboscopic Examination of the Larynx    BACKGROUND HISTORY:  Pt w/ hx of dysphonia and sinonasal complaints; placed on antibiotics for sinus infection and developed suspected fungal laryngitis in September '22. Started on Diflucan w/ improvement in vocal changes; septoplasty and turbinate reduction performed 10/11/22. Pt endorsed significant improvement in all symptoms since, including voice returning to baseline. Reported baseline lowered modal pitch; endorsed all females in family have low voice. Denied dysphagia or dyspnea. Referred by ENT to ensure full resolution of fungal laryngitis. Surgical/Medical History: See the above. Hydration: >64 oz of water  Smoking History: Previous for 10 yrs; quit 1998  Caffeine Intake: Coffee/pop    PER ENT NOTE, Dr. Anders Samuels, 9/26/22: The Diflucan seem to help with her voice. She still has a bit of hoarseness. I would like for her to see our speech therapy for further evaluation. Perceptual Quality: Pt presented with mild dysphonia characterized by lowered modal pitch and back-focused phonation; no audible roughness or strain present. Acoustic Analysis:  Maximum Sustained Phonation- 20 seconds  Avg Hz- 186  Max Hz- 610  Min Hz- 124    Flexible Stroboscopy Laryngoscopy  Procedure : Flexible Stroboscopy Laryngoscopy  Performed by: SANDRINE Adame  Anesthesia: Afrin with 4% lidocaine  Description:  The scope was passed along the floor of the right naris to the level of the larynx. There was no evidence of concerning masses or lesions of the base of tongue, vallecula, epiglottis, aryepiglottic folds, arytenoids, false vocal folds, true vocal folds, or pyriform sinuses. The scope was removed. The patient tolerated the procedure without difficulty. There were no complications.   Pertinent findings: See Assessment and Plan of Care              ASSESSMENT AND PLAN OF CARE:   75 y.o. female w/ previous hx of fungal laryngitis and dysphonia; endorsed voice returned to baseline. VLS revealed bilateral erythematous and edematous changes; inferior attachment of polypoid-like changes w/ similar appearance to that of Spring's edema. Glottic closure w/ posterior gap (considered functional in females) and mildly reduced superior-inferior wave of bilateral TVFs; grossly functional periodicity. Mild supraglottic compression across both planes (LM>AP). Mild-moderate interarytenoid pachydermia and posterior edema, erythema and thick mucus were observed, indicative of laryngopharyngeal reflux. Subglottis was patent without evidence of narrowing. No mass lesions, paresis or paralysis was noted. EDUCATION AND THERAPY:  Reviewed VLS w/ pt; educated to full resolution of fungal laryngitis and presence of edematous changes. Discussed most likely related to previous hx of smoking, but no further intervention required. Discussed to monitor for changes in breathing, as increased swelling could result in partial obstruction of airway w/ SOB, specifically during exertion. Pt expressed understanding and will RTC should changes occur. Introduced to [employer] to focus on vibratory movement of TVFs for possible reduction in edema; pt w/ understanding that full resolution unlikely given length of time. Able to complete w/o difficulties and endorsed sensation of forward resonance t/o. No pitch breaks or strain observed. Encouraged to complete consistently for 3-4 weeks and then use PRN; pt w/ good understanding to all the above and scheduled to f/u w/ ENT at end of week. SLP recommended: No    RECOMMENDATIONS:   Dr. Elise Comer was present and reviewed recorded evaluation to assist in diagnosis and provide assessment and plan for treatment. Follow good vocal hygiene behaviors and precautions, including increasing oral hydration.    Follow dietary precautions and behavioral lifestyle changes regarding laryngopharyngeal reflux, including taking PPI as prescribed by physician. Pt is a good candidate for further medical evaluation/intervention at the discretion of the treating physician. Pt to follow up with ENT/Dr. Elise Comer.       CPT Code Units Billed Time Billed Today Date of POC Start Re-Certification Date Referring Provider   77262, 29031, 12803, 36903 4 Unit Time in: 3206  Time out: 1000  Total time: 30 min 11/7/2022 60 days Dr. Elise Comer       Thank you,    Teodora Never) Yola Ludwig, [address] [place] [address]; [phone]  [employer] Speech-Language Pathologist

## 2022-11-11 ENCOUNTER — OFFICE VISIT (OUTPATIENT)
Dept: ENT CLINIC | Age: 67
End: 2022-11-11

## 2022-11-11 DIAGNOSIS — R09.81 NASAL CONGESTION: Primary | ICD-10-CM

## 2022-11-11 PROCEDURE — 99024 POSTOP FOLLOW-UP VISIT: CPT | Performed by: OTOLARYNGOLOGY

## 2022-11-11 NOTE — PROGRESS NOTES
The patient is following up from her septoplasty and turbinate reduction that I did on October 11, 2022. She is very happy with the results. She says she is breathing significantly better. She also saw her speech therapist    Exam  Slight rightward residual septal deviation. Well reduced turbinates    Plan  Significantly improved nasal airway. Subjectively the patient feels really good. I told her to follow-up with me as needed.

## 2022-11-23 ENCOUNTER — TELEPHONE (OUTPATIENT)
Dept: FAMILY MEDICINE CLINIC | Age: 67
End: 2022-11-23

## 2022-11-23 NOTE — TELEPHONE ENCOUNTER
----- Message from Jaison Azevedo sent at 11/23/2022 11:19 AM EST -----  Subject: Appointment Request    Reason for Call: Established Patient Appointment needed: Routine Medicare   AWV    QUESTIONS    Reason for appointment request? No appointments available during search     Additional Information for Provider? Pt is calling in to schedule her   annual wellness visit.  Please advise.   ---------------------------------------------------------------------------  --------------  Yvonne ADDISON  [phone]; OK to leave message on voicemail  ---------------------------------------------------------------------------  --------------  SCRIPT ANSWERS  COVID Screen: Raymond Morgan

## 2022-12-05 ENCOUNTER — HOSPITAL ENCOUNTER (OUTPATIENT)
Dept: WOMENS IMAGING | Age: 67
Discharge: HOME | End: 2022-12-05
Payer: MEDICARE

## 2022-12-05 DIAGNOSIS — Z78.0 MENOPAUSE: ICD-10-CM

## 2022-12-05 PROCEDURE — 77080 DXA BONE DENSITY AXIAL: CPT

## 2023-01-04 ENCOUNTER — TELEPHONE (OUTPATIENT)
Dept: FAMILY MEDICINE CLINIC | Age: 68
End: 2023-01-04

## 2023-01-04 NOTE — TELEPHONE ENCOUNTER
----- Message from [phone] sent at 1/4/2023  2:46 PM EST -----  Subject: Appointment Request    Reason for Call: Established Patient Appointment needed: Routine Medicare   AWV    QUESTIONS    Reason for appointment request? No appointments available during search     Additional Information for Provider? The patient is calling today and   states that she has notified the office several times for a well visit   appt and she has not heard back from anyone yet about an appt. She states   that she called 11/23/22 & 2 times in Dec 2022.  Can someone please call   the pt with an appt no appt's found during search.   ---------------------------------------------------------------------------  --------------  Maycol KAHN  [phone]; OK to leave message on voicemail  ---------------------------------------------------------------------------  --------------  SCRIPT ANSWERS  COVID Screen: Chandrakant Lama

## 2023-01-09 ENCOUNTER — OFFICE VISIT (OUTPATIENT)
Dept: FAMILY MEDICINE CLINIC | Age: 68
End: 2023-01-09
Payer: MEDICARE

## 2023-01-09 ENCOUNTER — TELEPHONE (OUTPATIENT)
Dept: ORTHOPEDIC SURGERY | Age: 68
End: 2023-01-09

## 2023-01-09 ENCOUNTER — OFFICE VISIT (OUTPATIENT)
Dept: ORTHOPEDIC SURGERY | Age: 68
End: 2023-01-09
Payer: MEDICARE

## 2023-01-09 VITALS — BODY MASS INDEX: 22.32 KG/M2 | RESPIRATION RATE: 16 BRPM | HEIGHT: 63 IN | WEIGHT: 126 LBS

## 2023-01-09 VITALS
SYSTOLIC BLOOD PRESSURE: 118 MMHG | TEMPERATURE: 97.3 F | HEIGHT: 63 IN | BODY MASS INDEX: 22.32 KG/M2 | WEIGHT: 126 LBS | DIASTOLIC BLOOD PRESSURE: 76 MMHG

## 2023-01-09 DIAGNOSIS — F40.240 CLAUSTROPHOBIA: Primary | ICD-10-CM

## 2023-01-09 DIAGNOSIS — K21.9 GASTROESOPHAGEAL REFLUX DISEASE, UNSPECIFIED WHETHER ESOPHAGITIS PRESENT: ICD-10-CM

## 2023-01-09 DIAGNOSIS — E78.00 HYPERCHOLESTEROLEMIA: Primary | ICD-10-CM

## 2023-01-09 DIAGNOSIS — E78.00 HYPERCHOLESTEROLEMIA: ICD-10-CM

## 2023-01-09 DIAGNOSIS — Z12.11 SCREENING FOR COLON CANCER: ICD-10-CM

## 2023-01-09 DIAGNOSIS — M25.511 ACUTE PAIN OF RIGHT SHOULDER: Primary | ICD-10-CM

## 2023-01-09 DIAGNOSIS — F41.9 ANXIETY: ICD-10-CM

## 2023-01-09 LAB
ALT SERPL-CCNC: 16 U/L (ref 10–40)
AST SERPL-CCNC: 23 U/L (ref 15–37)
CHOLESTEROL, TOTAL: 146 MG/DL (ref 0–199)
HDLC SERPL-MCNC: 75 MG/DL (ref 40–60)
LDL CHOLESTEROL CALCULATED: 57 MG/DL
TRIGL SERPL-MCNC: 70 MG/DL (ref 0–150)
VLDLC SERPL CALC-MCNC: 14 MG/DL

## 2023-01-09 PROCEDURE — G8420 CALC BMI NORM PARAMETERS: HCPCS | Performed by: INTERNAL MEDICINE

## 2023-01-09 PROCEDURE — G8484 FLU IMMUNIZE NO ADMIN: HCPCS | Performed by: INTERNAL MEDICINE

## 2023-01-09 PROCEDURE — 1036F TOBACCO NON-USER: CPT | Performed by: INTERNAL MEDICINE

## 2023-01-09 PROCEDURE — 3017F COLORECTAL CA SCREEN DOC REV: CPT | Performed by: INTERNAL MEDICINE

## 2023-01-09 PROCEDURE — G8427 DOCREV CUR MEDS BY ELIG CLIN: HCPCS | Performed by: INTERNAL MEDICINE

## 2023-01-09 PROCEDURE — 1124F ACP DISCUSS-NO DSCNMKR DOCD: CPT | Performed by: ORTHOPAEDIC SURGERY

## 2023-01-09 PROCEDURE — G8427 DOCREV CUR MEDS BY ELIG CLIN: HCPCS | Performed by: ORTHOPAEDIC SURGERY

## 2023-01-09 PROCEDURE — 1124F ACP DISCUSS-NO DSCNMKR DOCD: CPT | Performed by: INTERNAL MEDICINE

## 2023-01-09 PROCEDURE — 1090F PRES/ABSN URINE INCON ASSESS: CPT | Performed by: INTERNAL MEDICINE

## 2023-01-09 PROCEDURE — 99213 OFFICE O/P EST LOW 20 MIN: CPT | Performed by: ORTHOPAEDIC SURGERY

## 2023-01-09 PROCEDURE — 1090F PRES/ABSN URINE INCON ASSESS: CPT | Performed by: ORTHOPAEDIC SURGERY

## 2023-01-09 PROCEDURE — 99214 OFFICE O/P EST MOD 30 MIN: CPT | Performed by: INTERNAL MEDICINE

## 2023-01-09 PROCEDURE — G8420 CALC BMI NORM PARAMETERS: HCPCS | Performed by: ORTHOPAEDIC SURGERY

## 2023-01-09 PROCEDURE — 3017F COLORECTAL CA SCREEN DOC REV: CPT | Performed by: ORTHOPAEDIC SURGERY

## 2023-01-09 PROCEDURE — G8484 FLU IMMUNIZE NO ADMIN: HCPCS | Performed by: ORTHOPAEDIC SURGERY

## 2023-01-09 PROCEDURE — 1036F TOBACCO NON-USER: CPT | Performed by: ORTHOPAEDIC SURGERY

## 2023-01-09 PROCEDURE — G8399 PT W/DXA RESULTS DOCUMENT: HCPCS | Performed by: INTERNAL MEDICINE

## 2023-01-09 PROCEDURE — G8399 PT W/DXA RESULTS DOCUMENT: HCPCS | Performed by: ORTHOPAEDIC SURGERY

## 2023-01-09 RX ORDER — DIAZEPAM 5 MG/1
5 TABLET ORAL ONCE
Qty: 2 TABLET | Refills: 0 | Status: SHIPPED | OUTPATIENT
Start: 2023-01-09 | End: 2023-01-09

## 2023-01-09 ASSESSMENT — ENCOUNTER SYMPTOMS
BLOOD IN STOOL: 0
NAUSEA: 0
DIARRHEA: 0
APNEA: 0
CONSTIPATION: 0
VOMITING: 0
RHINORRHEA: 0
SINUS PRESSURE: 0
COUGH: 0
WHEEZING: 0
SHORTNESS OF BREATH: 0
SINUS PAIN: 0
SORE THROAT: 0
ABDOMINAL PAIN: 0

## 2023-01-09 ASSESSMENT — PATIENT HEALTH QUESTIONNAIRE - PHQ9
SUM OF ALL RESPONSES TO PHQ9 QUESTIONS 1 & 2: 0
2. FEELING DOWN, DEPRESSED OR HOPELESS: 0
SUM OF ALL RESPONSES TO PHQ QUESTIONS 1-9: 0
9. THOUGHTS THAT YOU WOULD BE BETTER OFF DEAD, OR OF HURTING YOURSELF: 0
3. TROUBLE FALLING OR STAYING ASLEEP: 0
1. LITTLE INTEREST OR PLEASURE IN DOING THINGS: 0
SUM OF ALL RESPONSES TO PHQ QUESTIONS 1-9: 0
10. IF YOU CHECKED OFF ANY PROBLEMS, HOW DIFFICULT HAVE THESE PROBLEMS MADE IT FOR YOU TO DO YOUR WORK, TAKE CARE OF THINGS AT HOME, OR GET ALONG WITH OTHER PEOPLE: 0
7. TROUBLE CONCENTRATING ON THINGS, SUCH AS READING THE NEWSPAPER OR WATCHING TELEVISION: 0
SUM OF ALL RESPONSES TO PHQ QUESTIONS 1-9: 0
4. FEELING TIRED OR HAVING LITTLE ENERGY: 0
5. POOR APPETITE OR OVEREATING: 0
6. FEELING BAD ABOUT YOURSELF - OR THAT YOU ARE A FAILURE OR HAVE LET YOURSELF OR YOUR FAMILY DOWN: 0
8. MOVING OR SPEAKING SO SLOWLY THAT OTHER PEOPLE COULD HAVE NOTICED. OR THE OPPOSITE, BEING SO FIGETY OR RESTLESS THAT YOU HAVE BEEN MOVING AROUND A LOT MORE THAN USUAL: 0
SUM OF ALL RESPONSES TO PHQ QUESTIONS 1-9: 0

## 2023-01-09 NOTE — TELEPHONE ENCOUNTER
Other PATIENT IS REQUESTING A CALL BACK. WOULD LIKE TO KNOW IF SHE NEEDS TO GET A PRE OP PHYSICAL.  [address] [phone]

## 2023-01-09 NOTE — PROGRESS NOTES
Immunization History   Administered Date(s) Administered    COVID-19, PFIZER Bivalent BOOSTER, DO NOT Dilute, (age 12y+), IM, 30 mcg/0.3 mL 10/25/2022    COVID-19, PFIZER PURPLE top, DILUTE for use, (age 15 y+), 30mcg/0.3mL 03/19/2021, 04/09/2021, 12/19/2021    Influenza Vaccine, unspecified formulation 10/31/2017    Influenza Virus Vaccine 11/01/2014, 10/01/2015, 10/28/2016, 10/10/2018    Influenza Whole 11/01/2012, 11/01/2014, 10/01/2015, 10/28/2016    Influenza, FLUARIX, FLULAVAL, FLUZONE (age 10 mo+) AND AFLURIA, (age 1 y+), PF, 0.5mL 10/10/2018    Influenza, FLUBLOK, (age 25 y+), PF, 0.5mL 10/07/2020    Influenza, FLUZONE (age 72 y+), High Dose, 0.7mL 10/19/2021, 10/25/2022    Influenza, High Dose (Fluzone 65 yrs and older) 10/02/2019    Pneumococcal Polysaccharide ([id number]) 03/04/2016, 05/07/2021    Tdap (Boostrix, Adacel) 06/26/2007, 03/04/2016, 02/07/2021    Zoster Live (Zostavax) 04/23/2015

## 2023-01-09 NOTE — TELEPHONE ENCOUNTER
S/W KRISTINE   She states she was confused and did not need a call back. However, while we were talking, she states she was able to schedule her MRI and would like to schedule her follow up appointment. She has been scheduled for 1/17/2023 to allow adequate time for MRI report completion as her MRI is scheduled for 1/13/2023 at 6:15pm.   Patient voiced understanding of the conversation and will contact the office with further questions or concerns.

## 2023-01-09 NOTE — TELEPHONE ENCOUNTER
S/W Tami Dean  regarding MRI RIGHT SHOULDER approval and authorization being valid until 2/8/2023. Patient was instructed that their MRI needs to be scheduled at [facility].     The patient was instructed to contact the facility to schedule  at [phone]. A follow up appointment will need to be scheduled to review the results and treatment plan. Will have Valium sent to pharmacy. Patient advised to have  for MRI due to side effects from Valium.

## 2023-01-09 NOTE — PROGRESS NOTES
CHIEF COMPLAINT: Right shoulder pain    History:    Pam De Oliveira is a 75 y.o. right handed female self-referred for evaluation and treatment of Right shoulder pain. She had right shoulder arthroscopy, subacromial decompression, and rotator cuff repair by me in 2017. This is evaluated as a personal injury. The pain began 6 weeks ago. Pain is rated as a 4/10. There was not an injury. It is located laterally, anteriorly, posteriorly, trapezius, scapula, triceps. Symptoms are worse with overhead activity, abduction. She does note progressive weakness. The patient has been doing home exercise program that was started when she was seen for left shoulder recently. This has not helped. The patient has not had an injection. The patient has tried NSAIDs with relief. She has tried heat with mild relief. The patient has not tried ice. Patient's occupation is retired.       Past Medical History:   Diagnosis Date    Acid reflux     Anxiety     Depression     Deviated septum 10/2022    \"severe on right side\"    Hyperlipidemia     controlled with meds    Ulcer, esophagus 09/2022    healed with meds       Past Surgical History:   Procedure Laterality Date    BREAST LUMPECTOMY      RIGHT BREAST X 3 (BENIGN)    BUNIONECTOMY Left 2008    BUNIONECTOMY Right     COLONOSCOPY      COSMETIC SURGERY  2014    face lift    FOOT SURGERY      SCREWS PLACED IN RIGHT FOOT    HERNIA REPAIR Left 12/20/2019    LEFT INGUINAL HERNIA REPAIR WITH MESH and ON Q PUMP performed by Lucien Grider MD at [facility] N/A 10/11/2022    SEPTOPLASTY, INFERIOR TURBINATE REDUCTION performed by Elsa Jovel MD at [facility] ARTHROSCOPY Right 08/04/2017    TUBAL LIGATION  1985       Current Outpatient Medications on File Prior to Visit   Medication Sig Dispense Refill    NONFORMULARY Indications: marijuana - no medicinal card      citalopram (CELEXA) 20 MG tablet TAKE ONE TABLET BY MOUTH DAILY 90 tablet 1 omeprazole (PRILOSEC) 20 MG delayed release capsule TAKE ONE CAPSULE BY MOUTH DAILY 90 capsule 1    atorvastatin (LIPITOR) 40 MG tablet TAKE ONE TABLET BY MOUTH EVERY NIGHT AT BEDTIME 90 tablet 1    fluticasone (FLONASE) 50 MCG/ACT nasal spray 1 spray by Each Nostril route daily 32 g 1    Calcium Carbonate-Vitamin D (CALTRATE 600+D PO) Take 1 tablet by mouth 2 times daily      Biotin 1 MG CAPS Take  by mouth daily. fish oil-omega-3 fatty acids 1000 MG capsule Take 1 g by mouth 2 times daily       Multiple Vitamins-Minerals (CENTRUM SILVER PO) Take  by mouth daily. No current facility-administered medications on file prior to visit.        No Known Allergies    Social History     Socioeconomic History    Marital status:      Spouse name: Not on file    Number of children: Not on file    Years of education: Not on file    Highest education level: Not on file   Occupational History     Employer: RETIRED   Tobacco Use    Smoking status: Former     Packs/day: 1.00     Years: 10.00     Pack years: 10.00     Types: Cigarettes     Start date:      Quit date: 1998     Years since quittin.2    Smokeless tobacco: Never   Vaping Use    Vaping Use: Never used   Substance and Sexual Activity    Alcohol use: Yes     Comment: once a week    Drug use: Yes     Frequency: 3.0 times per week     Types: Marijuana Amber Amble)    Sexual activity: Not Currently   Other Topics Concern    Not on file   Social History Narrative    Not on file     Social Determinants of Health     Financial Resource Strain: Low Risk     Difficulty of Paying Living Expenses: Not hard at all   Food Insecurity: No Food Insecurity    Worried About Running Out of Food in the Last Year: Never true    Ran Out of Food in the Last Year: Never true   Transportation Needs: Not on file   Physical Activity: Insufficiently Active    Days of Exercise per Week: 3 days    Minutes of Exercise per Session: 30 min   Stress: Not on file   Social Connections: Not on file   Intimate Partner Violence: Not At Risk    Fear of Current or Ex-Partner: No    Emotionally Abused: No    Physically Abused: No    Sexually Abused: No   Housing Stability: Not on file       Family History   Problem Relation Age of Onset    Diabetes Mother     Hypertension Mother     Heart Disease Mother     Stroke Father     Hypertension Father     Heart Disease Father         mi    Cancer Sister 62        BREAST    Breast Cancer Sister     Heart Disease Brother     Diabetes Brother            Physical Examination:      Vital signs:  Resp 16   Ht 5' 3\" (1.6 m)   Wt 126 lb (57.2 kg)   LMP 07/17/2004   BMI 22.32 kg/m²     General:   alert, appears stated age, cooperative, and no distress   Right Shoulder   Active ROM:   forward flexion 135, external rotation 20, internal rotation L4. Left shoulder: forward flexion 180, external rotation 80, internal rotation L4. Passive ROM:  forward flexion 180   Joint Tenderness:   lateral acromial   Neer:   positive   Gilliam:   positive   Strength:   4 to 5-/5 Supraspinatus, External rotation    Left shoulder: 5/5 Supraspinatus, External rotation    Drop-arm test:   equivocal   Belly-press test:   negative   Bear-hug test:   negative   Speed's test:   not tested   Bicipital groove tenderness:  negative   Singer's test:   not tested   Cross-body adduction test:   positive at triceps   AC joint tenderness:   positive     There are no skin lesions, cellulitis, or extreme edema in the upper extremities. Sensation is grossly intact to light touch bilaterally upper extremity. The patient has warm and well-perfused bilateral upper extremities with brisk capillary refill. Imaging   Right Shoulder X-Ray: 3 views obtained and reviewed  AC Joint: narrowing marked  Glenohumeral joint: no abnormalities noted. Eyelets for suture anchors noted in humeral head. Elevation humeral head: Moderate on AP view only.     Right Shoulder MRI: ordered, but not yet obtained      Assessment:      Right shoulder rotator cuff tear  GERD        Plan:      Natural history and expected course discussed. Questions answered. I discussed with the patient I suspect she may have a recurrent rotator cuff tear. She has weakness on rotator cuff testing. There is possible humeral head elevation on the AP view x-ray. MRI of right shoulder to evaluate cuff. Evaluate chronicity of tear and amount of fatty infiltration/muscle atrophy. Follow-up after MRI. Jovan Guerrero. Renay Purvis MD  Orthopaedic Surgery and Sports Medicine     Disclaimer: This note was generated with use of a verbal recognition program and an attempt was made to check for errors. It is possible that there are still dictated errors within this office note. If so, please bring any significant errors to my attention for an addendum. All efforts were made to ensure that this office note is accurate.

## 2023-01-09 NOTE — PATIENT INSTRUCTIONS
Thank you for choosing [facility]. Please bring a current list of medications to every appointment. Please contact your pharmacy for any prescription refill(s) that you are requesting.

## 2023-01-09 NOTE — PROGRESS NOTES
Steve Robin (:  1955) is a 75 y.o. female,Established patient, here for evaluation of the following chief complaint(s):  Check-Up (Hyperlipidemia, anxiety, GERD- patient denies any complaints at this time and request fasting lab order)  Steve Robin is a 75 y.o. female with the following history as recorded in [facility]:  Patient Active Problem List    Diagnosis Date Noted    Left inguinal hernia     Complete tear of right rotator cuff 2017    Hypercholesterolemia 2014    Dysphagia 2013    GERD (gastroesophageal reflux disease) 2012    Ulcer, esophagus     Depression     Anxiety      Current Outpatient Medications   Medication Sig Dispense Refill    citalopram (CELEXA) 20 MG tablet TAKE ONE TABLET BY MOUTH DAILY 90 tablet 1    omeprazole (PRILOSEC) 20 MG delayed release capsule TAKE ONE CAPSULE BY MOUTH DAILY 90 capsule 1    atorvastatin (LIPITOR) 40 MG tablet TAKE ONE TABLET BY MOUTH EVERY NIGHT AT BEDTIME 90 tablet 1    fluticasone (FLONASE) 50 MCG/ACT nasal spray 1 spray by Each Nostril route daily 32 g 1    Calcium Carbonate-Vitamin D (CALTRATE 600+D PO) Take 1 tablet by mouth 2 times daily      Biotin 1 MG CAPS Take  by mouth daily. fish oil-omega-3 fatty acids 1000 MG capsule Take 1 g by mouth 2 times daily       Multiple Vitamins-Minerals (CENTRUM SILVER PO) Take  by mouth daily. NONFORMULARY Indications: marijuana - no medicinal card       No current facility-administered medications for this visit. Allergies: Patient has no known allergies.   Past Medical History:   Diagnosis Date    Acid reflux     Anxiety     Depression     Deviated septum 10/2022    \"severe on right side\"    Hyperlipidemia     controlled with meds    Ulcer, esophagus 2022    healed with meds     Past Surgical History:   Procedure Laterality Date    BREAST LUMPECTOMY      RIGHT BREAST X 3 (BENIGN)    BUNIONECTOMY Left     BUNIONECTOMY Right     COLONOSCOPY      COSMETIC SURGERY   face lift    FOOT SURGERY      SCREWS PLACED IN RIGHT FOOT    HERNIA REPAIR Left 2019    LEFT INGUINAL HERNIA REPAIR WITH MESH and ON Q PUMP performed by Renata Neves MD at [facility] N/A 10/11/2022    SEPTOPLASTY, INFERIOR TURBINATE REDUCTION performed by Patricia Quinonez MD at [facility] ARTHROSCOPY Right 2017    TUBAL LIGATION  1985     Family History   Problem Relation Age of Onset    Diabetes Mother     Hypertension Mother     Heart Disease Mother     Stroke Father     Hypertension Father     Heart Disease Father         mi    Cancer Sister 62        BREAST    Breast Cancer Sister     Heart Disease Brother     Diabetes Brother      Social History     Tobacco Use    Smoking status: Former     Packs/day: 1.00     Years: 10.00     Pack years: 10.00     Types: Cigarettes     Start date: 79     Quit date: 1998     Years since quittin.2    Smokeless tobacco: Never   Substance Use Topics    Alcohol use: Yes     Comment: once a week           ASSESSMENT/PLAN:  1. Hypercholesterolemia  2. Screening for colon cancer   Diagnosis Orders   1. Hypercholesterolemia  Lipid Panel    AST    ALT      2. Screening for colon cancer  [facility] DO [place], Gastroenterology, [facility]      3. Gastroesophageal reflux disease, unspecified whether esophagitis present        4.  Anxiety         Above stable  Outpatient Encounter Medications as of 2023   Medication Sig Dispense Refill    citalopram (CELEXA) 20 MG tablet TAKE ONE TABLET BY MOUTH DAILY 90 tablet 1    omeprazole (PRILOSEC) 20 MG delayed release capsule TAKE ONE CAPSULE BY MOUTH DAILY 90 capsule 1    atorvastatin (LIPITOR) 40 MG tablet TAKE ONE TABLET BY MOUTH EVERY NIGHT AT BEDTIME 90 tablet 1    fluticasone (FLONASE) 50 MCG/ACT nasal spray 1 spray by Each Nostril route daily 32 g 1    Calcium Carbonate-Vitamin D (CALTRATE 600+D PO) Take 1 tablet by mouth 2 times daily      Biotin 1 MG CAPS Take by mouth daily. fish oil-omega-3 fatty acids 1000 MG capsule Take 1 g by mouth 2 times daily       Multiple Vitamins-Minerals (CENTRUM SILVER PO) Take  by mouth daily. NONFORMULARY Indications: marijuana - no medicinal card       No facility-administered encounter medications on file as of 1/9/2023. Orders Placed This Encounter   Procedures    Lipid Panel     Standing Status:   Future     Standing Expiration Date:   1/9/2024     Order Specific Question:   Is Patient Fasting?/# of Hours     Answer:   12    AST     Standing Status:   Future     Standing Expiration Date:   1/9/2024    ALT     Standing Status:   Future     Standing Expiration Date:   1/9/2024    [facility] - Randa Massey DO, Gastroenterology, [facility]     Referral Priority:   Routine     Referral Type:   Eval and Treat     Referral Reason:   Specialty Services Required     Referred to Provider:   Vignesh Townsend DO     Requested Specialty:   Gastroenterology     Number of Visits Requested:   1      No follow-ups on file. Subjective   SUBJECTIVE/OBJECTIVE:  HPI    Review of Systems   Constitutional:  Negative for chills, diaphoresis, fatigue and fever. HENT:  Negative for congestion, postnasal drip, rhinorrhea, sinus pressure, sinus pain and sore throat. Eyes:  Negative for visual disturbance. Respiratory:  Negative for apnea, cough, shortness of breath and wheezing. Cardiovascular:  Negative for chest pain and palpitations. Gastrointestinal:  Negative for abdominal pain, blood in stool, constipation, diarrhea, nausea and vomiting. Endocrine: Negative for polyuria. Genitourinary:  Negative for dysuria, frequency, hematuria and urgency. Musculoskeletal:  Negative for arthralgias and myalgias. Skin:  Negative for rash. Neurological:  Negative for dizziness, syncope, weakness and headaches. Hematological:  Negative for adenopathy.         Objective   Physical Exam  Vitals and nursing note reviewed. Constitutional:       General: She is not in acute distress. Appearance: Normal appearance. She is not ill-appearing or diaphoretic. HENT:      Head: Normocephalic. Right Ear: Tympanic membrane, ear canal and external ear normal.      Left Ear: Tympanic membrane, ear canal and external ear normal.   Eyes:      Extraocular Movements: Extraocular movements intact. Conjunctiva/sclera: Conjunctivae normal.      Pupils: Pupils are equal, round, and reactive to light. Cardiovascular:      Rate and Rhythm: Normal rate and regular rhythm. Heart sounds: No murmur heard. Pulmonary:      Effort: Pulmonary effort is normal. No respiratory distress. Breath sounds: Normal breath sounds. No wheezing or rhonchi. Abdominal:      General: There is no distension. Palpations: There is no mass. Tenderness: There is no abdominal tenderness. There is no guarding or rebound. Musculoskeletal:         General: No swelling or deformity. Cervical back: No rigidity. Lymphadenopathy:      Cervical: No cervical adenopathy. Skin:     Coloration: Skin is not jaundiced. Findings: No rash. Neurological:      General: No focal deficit present. Mental Status: She is alert and oriented to person, place, and time. Cranial Nerves: No cranial nerve deficit. Motor: No weakness.                 An electronic signature was used to authenticate this note.    --Miles Bolus, DO

## 2023-01-13 ENCOUNTER — HOSPITAL ENCOUNTER (OUTPATIENT)
Dept: MRI IMAGING | Age: 68
Discharge: HOME | End: 2023-01-13
Payer: MEDICARE

## 2023-01-13 DIAGNOSIS — M25.511 ACUTE PAIN OF RIGHT SHOULDER: ICD-10-CM

## 2023-01-13 PROCEDURE — 73221 MRI JOINT UPR EXTREM W/O DYE: CPT

## 2023-01-17 ENCOUNTER — OFFICE VISIT (OUTPATIENT)
Dept: ORTHOPEDIC SURGERY | Age: 68
End: 2023-01-17
Payer: MEDICARE

## 2023-01-17 VITALS — HEIGHT: 63 IN | BODY MASS INDEX: 22.32 KG/M2 | WEIGHT: 126 LBS

## 2023-01-17 DIAGNOSIS — M12.811 ROTATOR CUFF ARTHROPATHY OF RIGHT SHOULDER: Primary | ICD-10-CM

## 2023-01-17 PROCEDURE — 99213 OFFICE O/P EST LOW 20 MIN: CPT | Performed by: ORTHOPAEDIC SURGERY

## 2023-01-17 PROCEDURE — G8399 PT W/DXA RESULTS DOCUMENT: HCPCS | Performed by: ORTHOPAEDIC SURGERY

## 2023-01-17 PROCEDURE — 20610 DRAIN/INJ JOINT/BURSA W/O US: CPT | Performed by: ORTHOPAEDIC SURGERY

## 2023-01-17 PROCEDURE — G8484 FLU IMMUNIZE NO ADMIN: HCPCS | Performed by: ORTHOPAEDIC SURGERY

## 2023-01-17 PROCEDURE — 1124F ACP DISCUSS-NO DSCNMKR DOCD: CPT | Performed by: ORTHOPAEDIC SURGERY

## 2023-01-17 PROCEDURE — G8427 DOCREV CUR MEDS BY ELIG CLIN: HCPCS | Performed by: ORTHOPAEDIC SURGERY

## 2023-01-17 PROCEDURE — 3017F COLORECTAL CA SCREEN DOC REV: CPT | Performed by: ORTHOPAEDIC SURGERY

## 2023-01-17 PROCEDURE — 1036F TOBACCO NON-USER: CPT | Performed by: ORTHOPAEDIC SURGERY

## 2023-01-17 PROCEDURE — G8420 CALC BMI NORM PARAMETERS: HCPCS | Performed by: ORTHOPAEDIC SURGERY

## 2023-01-17 PROCEDURE — 1090F PRES/ABSN URINE INCON ASSESS: CPT | Performed by: ORTHOPAEDIC SURGERY

## 2023-01-17 RX ORDER — BUPIVACAINE HYDROCHLORIDE 2.5 MG/ML
4 INJECTION, SOLUTION INFILTRATION; PERINEURAL ONCE
Status: COMPLETED | OUTPATIENT
Start: 2023-01-17 | End: 2023-01-17

## 2023-01-17 RX ORDER — TRIAMCINOLONE ACETONIDE 40 MG/ML
40 INJECTION, SUSPENSION INTRA-ARTICULAR; INTRAMUSCULAR ONCE
Status: COMPLETED | OUTPATIENT
Start: 2023-01-17 | End: 2023-01-17

## 2023-01-17 RX ORDER — LIDOCAINE HYDROCHLORIDE 10 MG/ML
4 INJECTION, SOLUTION INFILTRATION; PERINEURAL ONCE
Status: COMPLETED | OUTPATIENT
Start: 2023-01-17 | End: 2023-01-17

## 2023-01-17 RX ADMIN — BUPIVACAINE HYDROCHLORIDE 10 MG: 2.5 INJECTION, SOLUTION INFILTRATION; PERINEURAL at 14:08

## 2023-01-17 RX ADMIN — LIDOCAINE HYDROCHLORIDE 4 ML: 10 INJECTION, SOLUTION INFILTRATION; PERINEURAL at 14:08

## 2023-01-17 RX ADMIN — TRIAMCINOLONE ACETONIDE 40 MG: 40 INJECTION, SUSPENSION INTRA-ARTICULAR; INTRAMUSCULAR at 14:08

## 2023-01-17 NOTE — PROGRESS NOTES
CHIEF COMPLAINT: Right shoulder pain    History:    Randell Schafer is a 75 y.o. right handed female here for right shoulder follow-up. Initial history: self-referred for evaluation and treatment of Right shoulder pain. She had right shoulder arthroscopy, subacromial decompression, and rotator cuff repair by me in 2017. This is evaluated as a personal injury. The pain began 6 weeks ago. Pain is rated as a 4/10. There was not an injury. It is located laterally, anteriorly, posteriorly, trapezius, scapula, triceps. Symptoms are worse with overhead activity, abduction. She does note progressive weakness. The patient has been doing home exercise program that was started when she was seen for left shoulder recently. This has not helped. The patient has not had an injection. The patient has tried NSAIDs with relief. She has tried heat with mild relief. The patient has not tried ice. Patient's occupation is retired. Interval History: Her shoulder is not improved. She rates pain 4/10.         Past Medical History:   Diagnosis Date    Acid reflux     Anxiety     Depression     Deviated septum 10/2022    \"severe on right side\"    Hyperlipidemia     controlled with meds    Ulcer, esophagus 09/2022    healed with meds       Past Surgical History:   Procedure Laterality Date    BREAST LUMPECTOMY      RIGHT BREAST X 3 (BENIGN)    BUNIONECTOMY Left 2008    BUNIONECTOMY Right     COLONOSCOPY      COSMETIC SURGERY  2014    face lift    FOOT SURGERY      SCREWS PLACED IN RIGHT FOOT    HERNIA REPAIR Left 12/20/2019    LEFT INGUINAL HERNIA REPAIR WITH MESH and ON Q PUMP performed by Reymundo Dancer, MD at [facility] N/A 10/11/2022    SEPTOPLASTY, INFERIOR TURBINATE REDUCTION performed by Matheus Varner MD at [facility] ARTHROSCOPY Right 08/04/2017    TUBAL LIGATION  1985       Current Outpatient Medications on File Prior to Visit   Medication Sig Dispense Refill    NONFORMULARY Indications: marijuana - no medicinal card      citalopram (CELEXA) 20 MG tablet TAKE ONE TABLET BY MOUTH DAILY 90 tablet 1    omeprazole (PRILOSEC) 20 MG delayed release capsule TAKE ONE CAPSULE BY MOUTH DAILY 90 capsule 1    atorvastatin (LIPITOR) 40 MG tablet TAKE ONE TABLET BY MOUTH EVERY NIGHT AT BEDTIME 90 tablet 1    fluticasone (FLONASE) 50 MCG/ACT nasal spray 1 spray by Each Nostril route daily 32 g 1    Calcium Carbonate-Vitamin D (CALTRATE 600+D PO) Take 1 tablet by mouth 2 times daily      Biotin 1 MG CAPS Take  by mouth daily. fish oil-omega-3 fatty acids 1000 MG capsule Take 1 g by mouth 2 times daily       Multiple Vitamins-Minerals (CENTRUM SILVER PO) Take  by mouth daily. No current facility-administered medications on file prior to visit.        No Known Allergies    Social History     Socioeconomic History    Marital status:      Spouse name: Not on file    Number of children: Not on file    Years of education: Not on file    Highest education level: Not on file   Occupational History     Employer: RETIRED   Tobacco Use    Smoking status: Former     Packs/day: 1.00     Years: 10.00     Pack years: 10.00     Types: Cigarettes     Start date:      Quit date: 1998     Years since quittin.3    Smokeless tobacco: Never   Vaping Use    Vaping Use: Never used   Substance and Sexual Activity    Alcohol use: Yes     Comment: once a week    Drug use: Yes     Frequency: 3.0 times per week     Types: Marijuana Norval Remak)    Sexual activity: Not Currently   Other Topics Concern    Not on file   Social History Narrative    Not on file     Social Determinants of Health     Financial Resource Strain: Low Risk     Difficulty of Paying Living Expenses: Not hard at all   Food Insecurity: No Food Insecurity    Worried About Running Out of Food in the Last Year: Never true    Ran Out of Food in the Last Year: Never true   Transportation Needs: Not on file   Physical Activity: Insufficiently Active    Days of Exercise per Week: 3 days    Minutes of Exercise per Session: 30 min   Stress: Not on file   Social Connections: Not on file   Intimate Partner Violence: Not At Risk    Fear of Current or Ex-Partner: No    Emotionally Abused: No    Physically Abused: No    Sexually Abused: No   Housing Stability: Not on file       Family History   Problem Relation Age of Onset    Diabetes Mother     Hypertension Mother     Heart Disease Mother     Stroke Father     Hypertension Father     Heart Disease Father         mi    Cancer Sister 62        BREAST    Breast Cancer Sister     Heart Disease Brother     Diabetes Brother            Physical Examination:      Vital signs:  Ht 5' 3\" (1.6 m)   Wt 126 lb (57.2 kg)   LMP 07/17/2004   BMI 22.32 kg/m²     General:   alert, appears stated age, cooperative, and no distress   Right Shoulder   Active ROM:   forward flexion 135, external rotation 20, internal rotation L4. Left shoulder: forward flexion 180, external rotation 80, internal rotation L4. Passive ROM:  forward flexion 180   Joint Tenderness:   lateral acromial   Neer:   positive   Gilliam:   positive   Strength:   4 to 5-/5 Supraspinatus, External rotation    Left shoulder: 5/5 Supraspinatus, External rotation        Imaging   Right Shoulder X-Ray:   AC Joint: narrowing marked  Glenohumeral joint: no abnormalities noted. Eyelets for suture anchors noted in humeral head. Elevation humeral head: Moderate on AP view only. Right Shoulder MRI: I independently reviewed the images, as well as the radiology report. Prior rotator cuff repair. Complete supraspinatus and essentially complete   infraspinatus tendon tears from the footprint, with dominant tendon   retraction to the level of the glenoid. Partial retracted subscapularis tendon tear from the footprint. Mild to moderate supraspinatus, infraspinatus, and subscapularis muscle   atrophy. High-riding humeral head. Mild to moderate glenohumeral joint osteoarthrosis. Mild AC joint osteoarthrosis. Goutalier grade 4 fatty infiltration of the supraspinatus, infraspinatus, and subscapularis        Assessment:      Right shoulder chronic rotator cuff tear/rotator cuff arthropathy  GERD        Plan:      Natural history and expected course discussed. Questions answered. We reviewed the MRI images and discussed the findings. Discussed that there is a high likelihood that she did not heal her prior rotator cuff repairs. She likely was able to compensate very well after she was doing postoperative physical therapy. Review of her prior MRI also did demonstrate some fatty infiltration of the supraspinatus, infraspinatus, and subscapularis at that time. I do not think she is a candidate for revision rotator cuff repair. PT referral provided. The risks and benefits of an injection were discussed with the patient. The patient had full opportunity to ask questions and all were answered. The patient then provided verbal informed consent. The skin was then prepped with betadine solution and alcohol. Under aseptic conditions, the  right subacromial space was injected with 4cc of 1% xylocaine, 4cc of 0.25% marcaine, and 1cc of Kenalog (40mg/ml). There were no immediate complications following the injection. The patient was advised of the possibility of injection site reaction and instructed to apply ice to the area and take NSAIDs if able. Ice as needed. NSAIDs as needed if no medical contraindication. Follow-up in 2 months. Simone Deluca. Nicho Torres MD  Orthopaedic Surgery and Sports Medicine     Disclaimer: This note was generated with use of a verbal recognition program and an attempt was made to check for errors. It is possible that there are still dictated errors within this office note. If so, please bring any significant errors to my attention for an addendum.   All efforts were made to ensure that this office note is accurate.

## 2023-01-25 NOTE — PLAN OF CARE
[facility]. [address]  Phone: [phone]   Fax:     [phone]                                                        Physical Therapy Certification    Dear Sheri Duncan MD,    We had the pleasure of evaluating the following patient for physical therapy services at [facility]. A summary of our findings can be found in the initial assessment below. This includes our plan of care. If you have any questions or concerns regarding these findings, please do not hesitate to contact me at the office phone number checked above. Thank you for the referral.       Physician Signature:_______________________________Date:__________________  By signing above (or electronic signature), therapists plan is approved by physician      Patient: Maurine Hodgkin   : 1955   MRN: [id number]  Referring Physician: Sheri Duncan MD      Evaluation Date: 2023      Medical Diagnosis Information:  Medical Diagnosis: Rotator cuff arthropathy of right shoulder [M12.811]   Treatment Diagnosis: decreased ability to use right ue for adl's                                Insurance information: PT Insurance Information: [employer]  Imaging   Right Shoulder X-Ray:   [facility] Joint: narrowing marked  Glenohumeral joint: no abnormalities noted. Eyelets for suture anchors noted in humeral head. Elevation humeral head: Moderate on AP view only. Right Shoulder MRI: I independently reviewed the images, as well as the radiology report. Prior rotator cuff repair. Complete supraspinatus and essentially complete   infraspinatus tendon tears from the footprint, with dominant tendon   retraction to the level of the glenoid. Partial retracted subscapularis tendon tear from the footprint.        Mild to moderate supraspinatus, infraspinatus, and subscapularis muscle atrophy. High-riding humeral head. Mild to moderate glenohumeral joint osteoarthrosis. Mild AC joint osteoarthrosis. Goutalier grade 4 fatty infiltration of the supraspinatus, infraspinatus, and subscapularis    Precautions/ Contra-indications:   Latex Allergy:   [x]  NO      []YES  Preferred Language for Healthcare:   [x]English       []other:    C-SSRS Triggered by Intake questionnaire (Past 2 wk assessment ):   [x] No, Questionnaire did not trigger screening.   [] Yes, Patient intake triggered C-SSRS Screening      [] C-SSRS Screening completed  [] PCP notified via Epic     SUBJECTIVE: Patient is a 76 y/o female with a hx of right sholder pain and decreased function since December. She did have a right rtc repair in 2017. She c/o constant aching pain in her right traps and shoulder that get worse with use of her arm especially overhead. She can't reach without increased pain. She wakes from pain at night. She got a cortisone shot that helped some.      Relevant Medical History:Additional Pertinent Hx: Right rtc repair 2017, anxiety, depression, lumpectomy x 3, hpl  Functional Outcomes Measure: UEFI = 68    Pain Scale: 3/10  Easing factors: rest  Provocative factors: overhead head activities     Type: [x]Constant   []Intermittent  []Radiating []Localized []other:     Numbness/Tingling: denies    Occupation/School:retired     Living Status/Prior Level of Function: Independent with ADLs and IADLs, ind    OBJECTIVE:   Posture: forward head tight pecs, rounded shoulder , slightly protracted scap right    Functional Mobility/Transfers: ind    Palpation: -tight and tender in rtc muscles, pecs, traps, decreased cerv facet jnt mob, upper thoracic jnt mob,           CERV ROM     Cervical Flexion 30    Cervical Extension 20    Cervical SB 15 bilat    Cervical rotation R-50, left-30         ROM Left Right   Shoulder Flex Wfl all  150   Shoulder Abd  130   Shoulder ER  50   Shoulder IR  50 Strength  Left Right   Shoulder Flex 5/5 3+/5 all   Shoulder Scap  3+   Shoulder ER  3+   Shoulder IR  3+   Scap strength  3+      Reflexes Normal Abnormal Comments   [x]ALL NORMAL            S1-2 Seated achilles [] []    S1-2 Prone knee bend [] []    L3-4 Patellar tendon [] []    C5-6 Biceps [x] []    C6 Brachioradialis [x] []    C7-8 Triceps [x] []    Clonus [] []    Babinski [] []    Bales's [] []      Reflexes/Sensation:    [x]Dermatomes/Myotomes intact    [x]Reflexes equal and normal bilaterally   []Other:    Joint mobility:    []Normal    [x]Hypo   []Hyper    Orthopedic Special Tests: Beth Drape slightly painful, No pain with Drop arm, Belly press, Hornblower, Speeds- neg                       [x] Patient history, allergies, meds reviewed. Medical chart reviewed. See intake form. Review Of Systems (ROS):  [x]Performed Review of systems (Integumentary, CardioPulmonary, Neurological) by intake and observation. Intake form has been scanned into medical record. Patient has been instructed to contact their primary care physician regarding ROS issues if not already being addressed at this time.       Co-morbidities/Complexities (which will affect course of rehabilitation):   []None           Arthritic conditions   []Rheumatoid arthritis (M05.9)  []Osteoarthritis (M19.91)   Cardiovascular conditions   []Hypertension (I10)  [x]Hyperlipidemia (E78.5)  []Angina pectoris (I20)  []Atherosclerosis (I70)  []CVA Musculoskeletal conditions   []Disc pathology   []Congenital spine pathologies   []Prior surgical intervention  []Osteoporosis (M81.8)  []Osteopenia (M85.8)   Endocrine conditions   []Hypothyroid (E03.9)  []Hyperthyroid Gastrointestinal conditions   []Constipation (H55.97)   Metabolic conditions   []Morbid obesity (E66.01)  []Diabetes type 1(E10.65) or 2 (E11.65)   []Neuropathy (G60.9)     Pulmonary conditions   []Asthma (J45)  []Coughing   []COPD (J44.9)   Psychological Disorders  [x]Anxiety (F41.9)  [x]Depression (F32.9)   []Other:   [x]Other:   right rtc repair 2017, lumpectomy,        Barriers to/and or personal factors that will affect rehab potential:              [x]Age  []Sex   []Smoker              []Motivation/Lack of Motivation                        []Co-Morbidities              []Cognitive Function, education/learning barriers              []Environmental, home barriers              []profession/work barriers  [x]past PT/medical experience  []other:  Justification:     Falls Risk Assessment (30 days):   [x] Falls Risk assessed and no intervention required.   [] Falls Risk assessed and Patient requires intervention due to being higher risk   TUG score (>12s at risk):     [] Falls education provided, including         ASSESSMENT:    Functional Impairments:     [x]Noted spinal or UE joint hypomobility   []Noted spinal or UE joint hypermobility   []Decreased spinal/UE functional ROM   []Abnormal reflexes/sensation/myotomal/dermatomal deficits   [x]Decreased RC/scapular/core strength and neuromuscular control    [x]Decreased UE functional strength   []other:      Functional Activity Limitations (from functional questionnaire and intake)   []Reduced ability to tolerate prolonged functional positions   []Reduced ability or difficulty with changes of positions or transfers between positions   [x]Reduced ability to maintain good posture and demonstrate good body mechanics with sitting, bending, and lifting   [x] Reduced ability or tolerance with driving and/or computer work   [x]Reduced ability to perform lifting, reaching, carrying tasks   [x]Reduced ability to reach behind back   [x]Reduced ability to sleep    [x]Reduced ability to tolerate any impact through UE or spine   [x]Reduced ability to  or hold objects   [x]Reduced ability to throw or toss an object   []other:    Participation Restrictions   [x]Reduced participation in self care activities   [x]Reduced participation in home management activities   [x]Reduced participation in work activities   [x]Reduced participation in social activities. [x]Reduced participation in sport/recreational activities. Classification/Subgrouping:   []signs/symptoms consistent with post-surgical status including decreased ROM, strength and function.     []signs/symptoms consistent with joint sprain/strain    [x]signs/symptoms consistent with shoulder impingement (internal, external, primary or secondary)   []signs/symptoms consistent with shoulder/elbow/wrist tendinopathy   [x]Signs/symptoms consistent with Rotator cuff tear   []sign/symptoms consistent with labral tear   []signs/symptoms consistent with rib dysfunction   []signs/symptoms consistent with postural dysfunction   []signs/symptoms consistent with Glenohumeral IR Deficit - <45 degrees   []signs/symptoms consistent with facet dysfunction of cervical/thoracic spine   [x]signs/symptoms consistent with pathology which may benefit from Dry Needling   []signs/symptoms which may limit the use of advanced manual therapy techniques: (Elevated CV risk profile, recent trauma, intolerance to end range positions, prior TIA, visual issues, UE neurological compromise )     Prognosis/Rehab Potential:      []Excellent   [x]Good    [x]Fair   []Poor    Tolerance of evaluation/treatment:    []Excellent   [x]Good    [x]Fair   []Poor    Physical Therapy Evaluation Complexity Justification  [x] A history of present problem with:  [] no personal factors and/or comorbidities that impact the plan of care;  [x]1-2 personal factors and/or comorbidities that impact the plan of care  []3 personal factors and/or comorbidities that impact the plan of care  [x] An examination of body systems using standardized tests and measures addressing any of the following: body structures and functions (impairments), activity limitations, and/or participation restrictions;:  [] a total of 1-2 or more elements   [] a total of 3 or more elements [x] a total of 4 or more elements   [x] A clinical presentation with:  [] stable and/or uncomplicated characteristics   [x] evolving clinical presentation with changing characteristics  [] unstable and unpredictable characteristics;   [x] Clinical decision making of [x] low, [] moderate, [] high complexity using standardized patient assessment instrument and/or measurable assessment of functional outcome. [x] EVAL (LOW) 56099 (typically 20 minutes face-to-face)  [] EVAL (MOD) 03715 (typically 30 minutes face-to-face)  [] EVAL (HIGH) 47743 (typically 45 minutes face-to-face)  [] RE-EVAL     PLAN: UE arom, aarom, prom, strengthening, scapular strength, myofascial release, joint mobs to gh, sc, ac, scapular thoracic, modalities for pain, hep    Frequency/Duration:  2 days per week for 12 Weeks:  Interventions:  [x]  Therapeutic exercise including: strength training, ROM, for scapula, core and Upper extremity, including postural re-education. [x]  NMR activation and proprioception for UE, periscapular and RC muscles and Core, including postural re-education. [x]  Manual therapy as indicated for shoulder, scapula, spine and associated soft tissue including: Dry Needling/IASTM, STM, PROM, Gr I-IV mobilizations, manipulation. [x] Modalities as needed that may include: thermal agents, E-stim, Biofeedback, US, iontophoresis as indicated  [x] Patient education on joint protection, postural re-education, activity modification, progression of HEP. HEP instruction: (see scanned forms)    GOALS:  Patient stated goal: \" I want to be able to use my arm better \"  [] Progressing: [] Met: [] Not Met: [] Adjusted    Therapist goals for Patient:   Short Term Goals: To be achieved in: 2 weeks  1. Independent in HEP and progression per patient tolerance, in order to prevent re-injury. [] Progressing: [] Met: [] Not Met: [] Adjusted  2.  Patient will have a decrease in pain to facilitate improvement in movement, function, and ADLs as indicated by Functional Deficits. [] Progressing: [] Met: [] Not Met: [] Adjusted    Long Term Goals: To be achieved in: 12 weeks  1. Increase FOTO functional outcome score from 68 to 72  to assist with reaching prior level of function. [] Progressing: [] Met: [] Not Met: [] Adjusted  2. Patient will demonstrate increased AROM to 75% wfl to allow for proper joint functioning as indicated by Functional Deficits. [] Progressing: [] Met: [] Not Met: [] Adjusted  3. Patient will demonstrate an increase in NM recruitment/activation and overall GH and scapular strength to within n5lbs HHD or WNL for proper functional mobility as indicated by patients Functional Deficits. [] Progressing: [] Met: [] Not Met: [] Adjusted  4. Patient will return to adl's  activities without increased symptoms or restriction. [] Progressing: [] Met: [] Not Met: [] Adjusted  5. (patient specific functional goal)     [] Progressing: [] Met: [] Not Met: [] Adjusted    Electronically signed by:  Delvin Rutherford PT      Note: If patient does not return for scheduled/recommended follow up visits, this note will serve as a discharge from care along with the most recent update on progress.

## 2023-01-31 ENCOUNTER — HOSPITAL ENCOUNTER (OUTPATIENT)
Dept: PHYSICAL THERAPY | Age: 68
Setting detail: THERAPIES SERIES
Discharge: HOME | End: 2023-01-31
Payer: MEDICARE

## 2023-01-31 PROCEDURE — 97110 THERAPEUTIC EXERCISES: CPT

## 2023-01-31 PROCEDURE — 97140 MANUAL THERAPY 1/> REGIONS: CPT

## 2023-01-31 PROCEDURE — 97161 PT EVAL LOW COMPLEX 20 MIN: CPT

## 2023-02-03 ENCOUNTER — APPOINTMENT (OUTPATIENT)
Dept: PHYSICAL THERAPY | Age: 68
End: 2023-02-03
Payer: MEDICARE

## 2023-02-03 ENCOUNTER — HOSPITAL ENCOUNTER (OUTPATIENT)
Dept: PHYSICAL THERAPY | Age: 68
Setting detail: THERAPIES SERIES
Discharge: HOME | End: 2023-02-03
Payer: MEDICARE

## 2023-02-03 PROCEDURE — 97110 THERAPEUTIC EXERCISES: CPT

## 2023-02-03 PROCEDURE — 97140 MANUAL THERAPY 1/> REGIONS: CPT

## 2023-02-03 PROCEDURE — 97161 PT EVAL LOW COMPLEX 20 MIN: CPT

## 2023-02-03 NOTE — FLOWSHEET NOTE
[address] [facility] [address]  Phone: [phone]   Fax:     [phone]        Physical Therapy Treatment Note/ Progress Report:       Date:  2023    Patient Name:  Johan Moore    :  1955  MRN: [id number]    Pertinent Medical History:Additional Pertinent Hx: Right rtc repair 2017, anxiety, depression, lumpectomy x 3, hpl    Medical/Treatment Diagnosis Information:  Medical Diagnosis: Other specific arthropathies, not elsewhere classified, right shoulder [M12.811]  Treatment Diagnosis: decreased ability to use right ue for adl's    Insurance/Certification information:  PT Insurance Information: [employer]  Physician Information:  Alejandra Hoover MD  Plan of care signed (Y/N): routed    Date of Patient follow up with Physician:      Progress Report: []  Yes  [x]  No     Date Range for reporting period:  Beginnin/3/2023  Ending:      Progress report due (10 Rx/or 30 days whichever is less): 1/3/02     Recertification due (POC duration/ or 90 days whichever is less):      Visit # POC/Insurance Allowable Auth Needed     []Yes    []No     Functional Outcomes Measure:   Date Assessed:  Test: uefi-68  Score:     Pain level:  3/10     History of Injury: Patient is a 76 y/o female with a hx of right sholder pain and decreased function since December. She did have a right rtc repair in . She c/o constant aching pain in her right traps and shoulder that get worse with use of her arm especially overhead. She can't reach without increased pain. She wakes from pain at night. She got a cortisone shot that helped some.      SUBJECTIVE:  Pt states, \" I can't reach overhead \"    OBJECTIVE:      CERV ROM       Cervical Flexion 30     Cervical Extension 20     Cervical SB 15 bilat     Cervical rotation R-50, left-30             ROM Left Right   Shoulder Flex Wfl all  150   Shoulder Abd   130   Shoulder ER   50   Shoulder IR   50                   Strength  Left Right   Shoulder Flex 5/5 3+/5 all   Shoulder Scap   3+   Shoulder ER   3+   Shoulder IR   3+   Scap strength   3+        RESTRICTIONS/PRECAUTIONS:     Exercises/Interventions:   Therapeutic Ex (52302)  Min: Resistance/Reps Cues/Notes   hep initiated    Nu step     Isos all                                                  Manual Intervention  (17847)  Min:15 Cerv left rot mobs gr 3, prom left cerv rot, mfr to rtc muscles , traps, levator,               NMR re-education (31212)  Min:     Tband bilat ext     Tband row     w                                        Therapeutic Activity (46297)  Min: Discussed mechanism of injury, anatomy, physiology, biomechanics, sleeping positions,  and strategies to accelerate the healing process. Answered all of patients questions regarding injury. Gave necessary information to get any equipment needed. Modalities:  Min                 Other Therapeutic Activities:  Pt was educated on PT POC, Diagnosis, Prognosis, pathomechanics as well as frequency and duration of scheduling future physical therapy appointments. Time was also taken on this day to answer all patient questions and participation in PT. Reviewed appointment policy in detail with patient and patient verbalized understanding. Home Exercise Program:Patient demonstrated proper technique, good tolerance,  and was given written instructions for the above exercises  Access Code: [email or web address]  URL: [email or web address]  Date: 02/03/2023  Prepared by: Rebecca Webb    Exercises  Seated Scapular Retraction - 1 x daily - 7 x weekly - 3 sets - 10 reps  Seated Cervical Rotation AROM - 1 x daily - 7 x weekly - 3 sets - 10 reps  Scaption Wall Slide with Towel - 1 x daily - 7 x weekly - 3 sets - 10 reps  Isometric Shoulder Flexion with Ball at [address] - 1 x daily - 7 x weekly - 3 sets - 10 reps      Therapeutic Exercise and NMR EXR  [] (47230) Provided verbal/tactile cueing for activities related to strengthening, flexibility, endurance, ROM  for improvements in scapular, scapulothoracic and UE control with self care, reaching, carrying, lifting, house/yardwork, driving/computer work.    [] (73127) Provided verbal/tactile cueing for activities related to improving balance, coordination, kinesthetic sense, posture, motor skill, proprioception  to assist with  scapular, scapulothoracic and UE control with self care, reaching, carrying, lifting, house/yardwork, driving/computer work. Therapeutic Activities:    [] (67027 or 72326) Provided verbal/tactile cueing for activities related to improving balance, coordination, kinesthetic sense, posture, motor skill, proprioception and motor activation to allow for proper function of scapular, scapulothoracic and UE control with self care, carrying, lifting, driving/computer work.      Home Exercise Program:    [x] (26635) Reviewed/Progressed HEP activities related to strengthening, flexibility, endurance, ROM of scapular, scapulothoracic and UE control with self care, reaching, carrying, lifting, house/yardwork, driving/computer work  [] (72155) Reviewed/Progressed HEP activities related to improving balance, coordination, kinesthetic sense, posture, motor skill, proprioception of scapular, scapulothoracic and UE control with self care, reaching, carrying, lifting, house/yardwork, driving/computer work      Manual Treatments:  PROM / STM / Oscillations-Mobs:  G-I, II, III, IV (PA's, Inf., Post.)  [] (24772) Provided manual therapy to mobilize soft tissue/joints of cervical/CT, scapular GHJ and UE for the purpose of modulating pain, promoting relaxation,  increasing ROM, reducing/eliminating soft tissue swelling/inflammation/restriction, improving soft tissue extensibility and allowing for proper ROM for normal function with self care, reaching, carrying, lifting, house/yardwork, driving/computer work    Charges:  Timed Code Treatment Minutes: 30   Total Treatment Minutes: 50       [x] EVAL (LOW) 11702 (typically 20 minutes face-to-face)  [] EVAL (MOD) 70257 (typically 30 minutes face-to-face)  [] EVAL (HIGH) 25548 (typically 45 minutes face-to-face)  [] RE-EVAL     [x] RK(73100) x  1   [] Dry needle 1 or 2 Muscles (06321)  [] NMR (86310) x     [] Dry needle 3+ Muscles (16220)  [x] Manual (94250) x1     [] Ultrasound (37816) x  [] TA (15706) x     [] Mech Traction (79810)  [] ES(attended) (10340)     [] ES (un) (47562):   [] Vasopump (68115) [] Ionto (69196)   [] Other:    If BW Please Indicate Time In/Out  CPT Code Time in Time out                                   Approval Dates:  CPT Code Units Approved Units Used  Date Updated:                     GOALS:  Patient stated goal: \" I want to be able to use my arm better \"  [] Progressing: [] Met: [] Not Met: [] Adjusted     Therapist goals for Patient:   Short Term Goals: To be achieved in: 2 weeks  1. Independent in HEP and progression per patient tolerance, in order to prevent re-injury. [] Progressing: [] Met: [] Not Met: [] Adjusted  2. Patient will have a decrease in pain to facilitate improvement in movement, function, and ADLs as indicated by Functional Deficits. [] Progressing: [] Met: [] Not Met: [] Adjusted     Long Term Goals: To be achieved in: 12 weeks  1. Increase FOTO functional outcome score from 68 to 72  to assist with reaching prior level of function. [] Progressing: [] Met: [] Not Met: [] Adjusted  2. Patient will demonstrate increased AROM to 75% wfl to allow for proper joint functioning as indicated by Functional Deficits. [] Progressing: [] Met: [] Not Met: [] Adjusted  3. Patient will demonstrate an increase in NM recruitment/activation and overall GH and scapular strength to within n5lbs HHD or WNL for proper functional mobility as indicated by patients Functional Deficits.    [] Progressing: [] Met: [] Not Met: [] Adjusted  4. Patient will return to adl's  activities without increased symptoms or restriction. [] Progressing: [] Met: [] Not Met: [] Adjusted  5. (patient specific functional goal)       [] Progressing: [] Met: [] Not Met: [] Adjusted    ASSESSMENT:  See eval    Treatment/Activity Tolerance:  [x] Patient tolerated treatment well [] Patient limited by fatique  [] Patient limited by pain  [] Patient limited by other medical complications  [] Other:     Overall Progression Towards Functional goals/ Treatment Progress Update:  [] Patient is progressing as expected towards functional goals listed. [] Progression is slowed due to complexities/Impairments listed. [] Progression has been slowed due to co-morbidities. [x] Plan just implemented, too soon to assess goals progression <30days   [] Goals require adjustment due to lack of progress  [] Patient is not progressing as expected and requires additional follow up with physician  [] Other    Prognosis for POC: [x] Good [] Fair  [] Poor    Patient requires continued skilled intervention: [x] Yes  [] No      PLAN:UE arom, aarom, prom, strengthening, scapular strength, myofascial release, joint mobs to gh, sc, ac, scapular thoracic, modalities for pain, hep, rom is good overall, needs a lot of work on scap and rtc/shoulder strength, limited left rot in cerv    [] Continue per plan of care [] Alter current plan (see comments)  [x] Plan of care initiated [] Hold pending MD visit [] Discharge    Electronically signed by: Kevan Kaur PT     Note: If patient does not return for scheduled/recommended follow up visits, this note will serve as a discharge from care along with the most recent update on progress.

## 2023-02-07 ENCOUNTER — HOSPITAL ENCOUNTER (OUTPATIENT)
Dept: PHYSICAL THERAPY | Age: 68
Setting detail: THERAPIES SERIES
Discharge: HOME | End: 2023-02-07
Payer: MEDICARE

## 2023-02-07 PROCEDURE — 97112 NEUROMUSCULAR REEDUCATION: CPT

## 2023-02-07 PROCEDURE — 97140 MANUAL THERAPY 1/> REGIONS: CPT

## 2023-02-07 PROCEDURE — 97110 THERAPEUTIC EXERCISES: CPT

## 2023-02-07 NOTE — FLOWSHEET NOTE
[address] [facility] [address]  Phone: [phone]   Fax:     [phone]        Physical Therapy Treatment Note/ Progress Report:       Date:  2023    Patient Name:  Linda Greene    :  1955  MRN: [id number]    Pertinent Medical History:Additional Pertinent Hx: Right rtc repair 2017, anxiety, depression, lumpectomy x 3, hpl    Medical/Treatment Diagnosis Information:  Medical Diagnosis: Other specific arthropathies, not elsewhere classified, right shoulder [M12.811]  Treatment Diagnosis: decreased ability to use right ue for adl's    Insurance/Certification information:  PT Insurance Information: [employer]  Physician Information:  Flo Sanchez MD  Plan of care signed (Y/N): routed    Date of Patient follow up with Physician:      Progress Report: []  Yes  [x]  No     Date Range for reporting period:  Beginnin2023  Ending:      Progress report due (10 Rx/or 30 days whichever is less): 84     Recertification due (POC duration/ or 90 days whichever is less):      Visit # POC/Insurance Allowable Auth Needed    12 visits by  []Yes    []No     Functional Outcomes Measure:   Date Assessed:  Test: uefi-68  Score:     Pain level:  1-3/10     History of Injury: Patient is a 74 y/o female with a hx of right sholder pain and decreased function since December. She did have a right rtc repair in . She c/o constant aching pain in her right traps and shoulder that get worse with use of her arm especially overhead. She can't reach without increased pain. She wakes from pain at night. She got a cortisone shot that helped some.      SUBJECTIVE:  Pt states, \" I can't reach overhead \"  23  Pt states, \" Moving better \"    OBJECTIVE:      CERV ROM       Cervical Flexion 30     Cervical Extension 20     Cervical SB 15 bilat     Cervical rotation R-50, left-30 ROM Left Right   Shoulder Flex Wfl all  150   Shoulder Abd   130   Shoulder ER   50   Shoulder IR   50                   Strength  Left Right   Shoulder Flex 5/5 3+/5 all   Shoulder Scap   3+   Shoulder ER   3+   Shoulder IR   3+   Scap strength   3+        RESTRICTIONS/PRECAUTIONS:     Exercises/Interventions:   Therapeutic Ex (51710)  Min:15 Resistance/Reps Cues/Notes   hep initiated    Nu step X 5 min    Isos all Towards end range x 10 ea    Sl er 2# x 30    Sl scap retract X 30                                       Manual Intervention  (36874)  Min:15 Cerv left rot mobs gr 3, prom left cerv rot, mfr to rtc muscles , traps, levator, prom all shoulder motions              NMR re-education (67132)  Min:15     Tband bilat ext Blue x 30    Tband row Blue x 20    w     lawnmower 5# x 30    Er wall walk Yellow x 2 min    Ball on wall X 2 min at 90    scaption X 30                   Therapeutic Activity (41038)  Min: Discussed mechanism of injury, anatomy, physiology, biomechanics, sleeping positions,  and strategies to accelerate the healing process. Answered all of patients questions regarding injury. Gave necessary information to get any equipment needed. Modalities:  Min                 Other Therapeutic Activities:  Pt was educated on PT POC, Diagnosis, Prognosis, pathomechanics as well as frequency and duration of scheduling future physical therapy appointments. Time was also taken on this day to answer all patient questions and participation in PT. Reviewed appointment policy in detail with patient and patient verbalized understanding. Home Exercise Program:Patient demonstrated proper technique, good tolerance,  and was given written instructions for the above exercises  Access Code: [email or web address]  URL: [email or web address]  Date: 02/03/2023  Prepared by: Katie Yates    Exercises  Seated Scapular Retraction - 1 x daily - 7 x weekly - 3 sets - 10 reps  Seated Cervical Rotation AROM - 1 x daily - 7 x weekly - 3 sets - 10 reps  Scaption Wall Slide with Towel - 1 x daily - 7 x weekly - 3 sets - 10 reps  Isometric Shoulder Flexion with Ball at [address] - 1 x daily - 7 x weekly - 3 sets - 10 reps  Access Code: [email or web address]  URL: [email or web address]  Date: 02/07/2023  Prepared by: Yue Leonardo    Exercises  Forearm Walks on Wall with Resistance Band - 1 x daily - 7 x weekly - 3 sets - 10 reps      Therapeutic Exercise and NMR EXR  [] (27551) Provided verbal/tactile cueing for activities related to strengthening, flexibility, endurance, ROM  for improvements in scapular, scapulothoracic and UE control with self care, reaching, carrying, lifting, house/yardwork, driving/computer work.    [] (73167) Provided verbal/tactile cueing for activities related to improving balance, coordination, kinesthetic sense, posture, motor skill, proprioception  to assist with  scapular, scapulothoracic and UE control with self care, reaching, carrying, lifting, house/yardwork, driving/computer work. Therapeutic Activities:    [] (97753 or 32652) Provided verbal/tactile cueing for activities related to improving balance, coordination, kinesthetic sense, posture, motor skill, proprioception and motor activation to allow for proper function of scapular, scapulothoracic and UE control with self care, carrying, lifting, driving/computer work.      Home Exercise Program:    [x] (74127) Reviewed/Progressed HEP activities related to strengthening, flexibility, endurance, ROM of scapular, scapulothoracic and UE control with self care, reaching, carrying, lifting, house/yardwork, driving/computer work  [] (03151) Reviewed/Progressed HEP activities related to improving balance, coordination, kinesthetic sense, posture, motor skill, proprioception of scapular, scapulothoracic and UE control with self care, reaching, carrying, lifting, house/yardwork, driving/computer work      Manual Treatments:  PROM / STM / Oscillations-Mobs:  G-I, II, III, IV (Jaxon, Inf., Post.)  [] (94875) Provided manual therapy to mobilize soft tissue/joints of cervical/CT, scapular GHJ and UE for the purpose of modulating pain, promoting relaxation,  increasing ROM, reducing/eliminating soft tissue swelling/inflammation/restriction, improving soft tissue extensibility and allowing for proper ROM for normal function with self care, reaching, carrying, lifting, house/yardwork, driving/computer work    Charges:  Timed Code Treatment Minutes: 45   Total Treatment Minutes: 50       [] EVAL (LOW) 62144 (typically 20 minutes face-to-face)  [] EVAL (MOD) 44540 (typically 30 minutes face-to-face)  [] EVAL (HIGH) 82218 (typically 45 minutes face-to-face)  [] RE-EVAL     [x] EK(96460) x  1  [] Dry needle 1 or 2 Muscles (46519)  [x] NMR (56861) x 1    [] Dry needle 3+ Muscles (34836)  [x] Manual (45630) x1     [] Ultrasound (72040) x  [] TA (51159) x     [] Mech Traction (98395)  [] ES(attended) (23726)     [] ES (un) (42466):   [] Vasopump (83001) [] Ionto (39539)   [] Other:    If [facility] Please Indicate Time In/Out  CPT Code Time in Time out                                   Approval Dates:  CPT Code Units Approved Units Used  Date Updated:                     GOALS:  Patient stated goal: \" I want to be able to use my arm better \"  [] Progressing: [] Met: [] Not Met: [] Adjusted     Therapist goals for Patient:   Short Term Goals: To be achieved in: 2 weeks  1. Independent in HEP and progression per patient tolerance, in order to prevent re-injury. [] Progressing: [] Met: [] Not Met: [] Adjusted  2. Patient will have a decrease in pain to facilitate improvement in movement, function, and ADLs as indicated by Functional Deficits. [] Progressing: [] Met: [] Not Met: [] Adjusted     Long Term Goals: To be achieved in: 12 weeks  1. Increase FOTO functional outcome score from 68 to 72  to assist with reaching prior level of function. [] Progressing: [] Met: [] Not Met: [] Adjusted  2. Patient will demonstrate increased AROM to 75% wfl to allow for proper joint functioning as indicated by Functional Deficits. [] Progressing: [] Met: [] Not Met: [] Adjusted  3. Patient will demonstrate an increase in NM recruitment/activation and overall GH and scapular strength to within n5lbs HHD or WNL for proper functional mobility as indicated by patients Functional Deficits. [] Progressing: [] Met: [] Not Met: [] Adjusted  4. Patient will return to adl's  activities without increased symptoms or restriction. [] Progressing: [] Met: [] Not Met: [] Adjusted  5. (patient specific functional goal)       [] Progressing: [] Met: [] Not Met: [] Adjusted    ASSESSMENT:  See eval    Treatment/Activity Tolerance:  [x] Patient tolerated treatment well [] Patient limited by fatique  [] Patient limited by pain  [] Patient limited by other medical complications  [] Other:     Overall Progression Towards Functional goals/ Treatment Progress Update:  [] Patient is progressing as expected towards functional goals listed. [] Progression is slowed due to complexities/Impairments listed. [] Progression has been slowed due to co-morbidities.   [x] Plan just implemented, too soon to assess goals progression <30days   [] Goals require adjustment due to lack of progress  [] Patient is not progressing as expected and requires additional follow up with physician  [] Other    Prognosis for POC: [x] Good [] Fair  [] Poor    Patient requires continued skilled intervention: [x] Yes  [] No      PLAN:UE arom, aarom, prom, strengthening, scapular strength, myofascial release, joint mobs to gh, sc, ac, scapular thoracic, modalities for pain, hep, rom is good overall, needs a lot of work on scap and rtc/shoulder strength, limited left rot in cerv    [] Continue per plan of care [] Alter current plan (see comments)  [x] Plan of care initiated [] Hold pending MD visit [] Discharge    Electronically signed by: Juvencio Higgins, PT Note: If patient does not return for scheduled/recommended follow up visits, this note will serve as a discharge from care along with the most recent update on progress.

## 2023-02-09 ENCOUNTER — HOSPITAL ENCOUNTER (OUTPATIENT)
Dept: PHYSICAL THERAPY | Age: 68
Setting detail: THERAPIES SERIES
Discharge: HOME | End: 2023-02-09
Payer: MEDICARE

## 2023-02-09 PROCEDURE — 97110 THERAPEUTIC EXERCISES: CPT

## 2023-02-09 PROCEDURE — 97112 NEUROMUSCULAR REEDUCATION: CPT

## 2023-02-09 PROCEDURE — 97140 MANUAL THERAPY 1/> REGIONS: CPT

## 2023-02-09 NOTE — FLOWSHEET NOTE
[address] [facility] [address]  Phone: [phone]   Fax:     [phone]        Physical Therapy Treatment Note/ Progress Report:       Date:  2023    Patient Name:  Dylan Galvan    :  1955  MRN: [id number]    Pertinent Medical History:Additional Pertinent Hx: Right rtc repair 2017, anxiety, depression, lumpectomy x 3, hpl    Medical/Treatment Diagnosis Information:  Medical Diagnosis: Other specific arthropathies, not elsewhere classified, right shoulder [M12.811]  Treatment Diagnosis: decreased ability to use right ue for adl's    Insurance/Certification information:  PT Insurance Information: [employer]  Physician Information:  Steve Lawler MD  Plan of care signed (Y/N): routed    Date of Patient follow up with Physician:      Progress Report: []  Yes  [x]  No     Date Range for reporting period:  Beginnin2023  Ending:      Progress report due (10 Rx/or 30 days whichever is less): 1/3/18     Recertification due (POC duration/ or 90 days whichever is less):      Visit # POC/Insurance Allowable Auth Needed   3/12 12 visits by  [x]Yes    []No     Functional Outcomes Measure:   Date Assessed:  Test: uefi-68  Score:     Pain level:  1-2/10     History of Injury: Patient is a 74 y/o female with a hx of right sholder pain and decreased function since December. She did have a right rtc repair in . She c/o constant aching pain in her right traps and shoulder that get worse with use of her arm especially overhead. She can't reach without increased pain. She wakes from pain at night. She got a cortisone shot that helped some. SUBJECTIVE:  Pt states, \" I can't reach overhead \"  23  Pt states, \" Moving better \"  23 Patient report shoulder is improving able to reach with less pain. Doing well with HEP.     OBJECTIVE:      CERV ROM       Cervical Flexion 30     Cervical Extension 20     Cervical SB 15 bilat     Cervical rotation R-50, left-30             ROM Left Right   Shoulder Flex Wfl all  150   Shoulder Abd   130   Shoulder ER   50   Shoulder IR   50                   Strength  Left Right   Shoulder Flex 5/5 3+/5 all   Shoulder Scap   3+   Shoulder ER   3+   Shoulder IR   3+   Scap strength   3+        RESTRICTIONS/PRECAUTIONS:     Exercises/Interventions:   Therapeutic Ex (00921)  Min:15 Resistance/Reps Cues/Notes   hep initiated    Nu step X 5 min    Isos all Towards end range x 10 ea    Sl er 1# x 30    Sl scap retract X 30                                       Manual Intervention  (29176)  Min:15 Cerv left rot mobs gr 3, prom left cerv rot, mfr to rtc muscles , traps, levator, prom all shoulder motions              NMR re-education (47431)  Min:15     Tband bilat ext Blue x 30    Tband row Blue x 20    w Red 2 x 10    lawnmower 5# x 30    Er wall walk Yellow x 2 min    Ball on wall X 2 min at 90    scaption X 30                   Therapeutic Activity (04795)  Min: Discussed mechanism of injury, anatomy, physiology, biomechanics, sleeping positions,  and strategies to accelerate the healing process. Answered all of patients questions regarding injury. Gave necessary information to get any equipment needed. Modalities:  Min                 Other Therapeutic Activities:  Pt was educated on PT POC, Diagnosis, Prognosis, pathomechanics as well as frequency and duration of scheduling future physical therapy appointments. Time was also taken on this day to answer all patient questions and participation in PT. Reviewed appointment policy in detail with patient and patient verbalized understanding. Home Exercise Program:Patient demonstrated proper technique, good tolerance,  and was given written instructions for the above exercises  Access Code: [email or web address]  URL: [email or web address]  Date: 02/03/2023  Prepared by: Elio Andrews Eastman    Exercises  Seated Scapular Retraction - 1 x daily - 7 x weekly - 3 sets - 10 reps  Seated Cervical Rotation AROM - 1 x daily - 7 x weekly - 3 sets - 10 reps  Scaption Wall Slide with Towel - 1 x daily - 7 x weekly - 3 sets - 10 reps  Isometric Shoulder Flexion with Ball at Wall - 1 x daily - 7 x weekly - 3 sets - 10 reps  Access Code: [email or web address]  URL: [email or web address]  Date: 02/07/2023  Prepared by: Shaheen Royal    Exercises  Forearm Walks on Wall with Resistance Band - 1 x daily - 7 x weekly - 3 sets - 10 reps      Therapeutic Exercise and NMR EXR  [] (82530) Provided verbal/tactile cueing for activities related to strengthening, flexibility, endurance, ROM  for improvements in scapular, scapulothoracic and UE control with self care, reaching, carrying, lifting, house/yardwork, driving/computer work.    [] (07811) Provided verbal/tactile cueing for activities related to improving balance, coordination, kinesthetic sense, posture, motor skill, proprioception  to assist with  scapular, scapulothoracic and UE control with self care, reaching, carrying, lifting, house/yardwork, driving/computer work. Therapeutic Activities:    [] (47873 or 90571) Provided verbal/tactile cueing for activities related to improving balance, coordination, kinesthetic sense, posture, motor skill, proprioception and motor activation to allow for proper function of scapular, scapulothoracic and UE control with self care, carrying, lifting, driving/computer work.      Home Exercise Program:    [x] (61758) Reviewed/Progressed HEP activities related to strengthening, flexibility, endurance, ROM of scapular, scapulothoracic and UE control with self care, reaching, carrying, lifting, house/yardwork, driving/computer work  [] (40407) Reviewed/Progressed HEP activities related to improving balance, coordination, kinesthetic sense, posture, motor skill, proprioception of scapular, scapulothoracic and UE control with self care, reaching, carrying, lifting, house/yardwork, driving/computer work      Manual Treatments:  PROM / STM / Oscillations-Mobs:  G-I, II, III, IV (PA's, Inf., Post.)  [] (15593) Provided manual therapy to mobilize soft tissue/joints of cervical/CT, scapular GHJ and UE for the purpose of modulating pain, promoting relaxation,  increasing ROM, reducing/eliminating soft tissue swelling/inflammation/restriction, improving soft tissue extensibility and allowing for proper ROM for normal function with self care, reaching, carrying, lifting, house/yardwork, driving/computer work    Charges:  Timed Code Treatment Minutes: 45   Total Treatment Minutes: 50       [] EVAL (LOW) 22592 (typically 20 minutes face-to-face)  [] EVAL (MOD) 81974 (typically 30 minutes face-to-face)  [] EVAL (HIGH) 17941 (typically 45 minutes face-to-face)  [] RE-EVAL     [x] BT(69030) x  1  [] Dry needle 1 or 2 Muscles (02960)  [x] NMR (86625) x 1    [] Dry needle 3+ Muscles (37734)  [x] Manual (67216) x1     [] Ultrasound (33883) x  [] TA (03681) x     [] Mech Traction (10268)  [] ES(attended) (19338)     [] ES (un) (97547):   [] Vasopump (76573) [] Ionto (59081)   [] Other:    If [facility] Please Indicate Time In/Out  CPT Code Time in Time out                                   Approval Dates:  CPT Code Units Approved Units Used  Date Updated:                     GOALS:  Patient stated goal: \" I want to be able to use my arm better \"  [] Progressing: [] Met: [] Not Met: [] Adjusted     Therapist goals for Patient:   Short Term Goals: To be achieved in: 2 weeks  1. Independent in HEP and progression per patient tolerance, in order to prevent re-injury. [] Progressing: [] Met: [] Not Met: [] Adjusted  2. Patient will have a decrease in pain to facilitate improvement in movement, function, and ADLs as indicated by Functional Deficits. [] Progressing: [] Met: [] Not Met: [] Adjusted     Long Term Goals: To be achieved in: 12 weeks  1.   Increase FOTO functional outcome score from 68 to 72  to assist with reaching prior level of function. [] Progressing: [] Met: [] Not Met: [] Adjusted  2. Patient will demonstrate increased AROM to 75% wfl to allow for proper joint functioning as indicated by Functional Deficits. [] Progressing: [] Met: [] Not Met: [] Adjusted  3. Patient will demonstrate an increase in NM recruitment/activation and overall GH and scapular strength to within n5lbs HHD or WNL for proper functional mobility as indicated by patients Functional Deficits. [] Progressing: [] Met: [] Not Met: [] Adjusted  4. Patient will return to adl's  activities without increased symptoms or restriction. [] Progressing: [] Met: [] Not Met: [] Adjusted  5. (patient specific functional goal)       [] Progressing: [] Met: [] Not Met: [] Adjusted    ASSESSMENT:  Tolerated treatment well. Moderate fatigue with ER strengthening,improving cervical ROM. Treatment/Activity Tolerance:  [x] Patient tolerated treatment well [] Patient limited by fatique  [] Patient limited by pain  [] Patient limited by other medical complications  [] Other:     Overall Progression Towards Functional goals/ Treatment Progress Update:  [] Patient is progressing as expected towards functional goals listed. [] Progression is slowed due to complexities/Impairments listed. [] Progression has been slowed due to co-morbidities.   [x] Plan just implemented, too soon to assess goals progression <30days   [] Goals require adjustment due to lack of progress  [] Patient is not progressing as expected and requires additional follow up with physician  [] Other    Prognosis for POC: [x] Good [] Fair  [] Poor    Patient requires continued skilled intervention: [x] Yes  [] No      PLAN:UE arom, aarom, prom, strengthening, scapular strength, myofascial release, joint mobs to gh, sc, ac, scapular thoracic, modalities for pain, hep, rom is good overall, needs a lot of work on scap and rtc/shoulder strength, limited left rot in cerv    [] Continue per plan of care [] Alter current plan (see comments)  [x] Plan of care initiated [] Hold pending MD visit [] Discharge    Electronically signed by: Doris Olmstead PTA     Note: If patient does not return for scheduled/recommended follow up visits, this note will serve as a discharge from care along with the most recent update on progress.

## 2023-02-14 ENCOUNTER — HOSPITAL ENCOUNTER (OUTPATIENT)
Dept: PHYSICAL THERAPY | Age: 68
Setting detail: THERAPIES SERIES
Discharge: HOME | End: 2023-02-14
Payer: MEDICARE

## 2023-02-14 PROCEDURE — 97140 MANUAL THERAPY 1/> REGIONS: CPT

## 2023-02-14 PROCEDURE — 97112 NEUROMUSCULAR REEDUCATION: CPT

## 2023-02-14 PROCEDURE — 97110 THERAPEUTIC EXERCISES: CPT

## 2023-02-14 NOTE — FLOWSHEET NOTE
[address] [facility] [address]  Phone: [phone]   Fax:     [phone]        Physical Therapy Treatment Note/ Progress Report:       Date:  2023    Patient Name:  Velia Gr    :  1955  MRN: [id number]    Pertinent Medical History:Additional Pertinent Hx: Right rtc repair 2017, anxiety, depression, lumpectomy x 3, hpl    Medical/Treatment Diagnosis Information:  Medical Diagnosis: Other specific arthropathies, not elsewhere classified, right shoulder [M12.811]  Treatment Diagnosis: decreased ability to use right ue for adl's    Insurance/Certification information:  PT Insurance Information: [employer]  Physician Information:  Sierra Villalta MD  Plan of care signed (Y/N): routed    Date of Patient follow up with Physician:      Progress Report: []  Yes  [x]  No     Date Range for reporting period:  Beginnin2023  Ending:      Progress report due (10 Rx/or 30 days whichever is less): 08     Recertification due (POC duration/ or 90 days whichever is less):      Visit # POC/Insurance Allowable Auth Needed    12 visits by  [x]Yes    []No     Functional Outcomes Measure:   Date Assessed:  Test: uefi-68  Score:     Pain level:  1-2/10     History of Injury: Patient is a 74 y/o female with a hx of right sholder pain and decreased function since December. She did have a right rtc repair in . She c/o constant aching pain in her right traps and shoulder that get worse with use of her arm especially overhead. She can't reach without increased pain. She wakes from pain at night. She got a cortisone shot that helped some. SUBJECTIVE:  Pt states, \" I can't reach overhead \"  23  Pt states, \" Moving better \"  23 Patient report shoulder is improving able to reach with less pain. Doing well with HEP.  23  Pt states, \" A little sore from working out and doing things around the house \"    OBJECTIVE:      CERV ROM       Cervical Flexion 30     Cervical Extension 20     Cervical SB 15 bilat     Cervical rotation R-50, left-30             ROM Left Right   Shoulder Flex Wfl all  150   Shoulder Abd   130   Shoulder ER   50   Shoulder IR   50                   Strength  Left Right   Shoulder Flex 5/5 3+/5 all   Shoulder Scap   3+   Shoulder ER   3+   Shoulder IR   3+   Scap strength   3+        RESTRICTIONS/PRECAUTIONS:     Exercises/Interventions:   Therapeutic Ex (56998)  Min:15 Resistance/Reps Cues/Notes   hep initiated    Nu step X 5 min    Isos all Towards end range x 10 ea    Sl er 2# x 30    Sl scap retract X 30    Prone scap retraction X 30    Prone scap add X 30    Prone ue raises X 30                        Manual Intervention  (12625)  Min:15 Cerv left rot mobs gr 3, prom left cerv rot, mfr to rtc muscles , traps, levator, prom all shoulder motions              NMR re-education (30129)  Min:15     Tband bilat ext Blue x 30    Tband row Blue x 20    Shoulder flex Yellow x 30    w Red 2 x 10    lawnmower 5# x 30    Er wall walk Yellow x 2 min    Ball on wall X 2 min at 90    scaption X 30    Body blade X 2 min              Therapeutic Activity (51152)  Min: Discussed mechanism of injury, anatomy, physiology, biomechanics, sleeping positions,  and strategies to accelerate the healing process. Answered all of patients questions regarding injury. Gave necessary information to get any equipment needed. Modalities:  Min                 Other Therapeutic Activities:  Pt was educated on PT POC, Diagnosis, Prognosis, pathomechanics as well as frequency and duration of scheduling future physical therapy appointments. Time was also taken on this day to answer all patient questions and participation in PT. Reviewed appointment policy in detail with patient and patient verbalized understanding.      Home Exercise Program:Patient demonstrated proper technique, good tolerance,  and was given written instructions for the above exercises  Access Code: [email or web address]  URL: [email or web address]  Date: 02/03/2023  Prepared by: Mahala Kanner    Exercises  Seated Scapular Retraction - 1 x daily - 7 x weekly - 3 sets - 10 reps  Seated Cervical Rotation AROM - 1 x daily - 7 x weekly - 3 sets - 10 reps  Scaption Wall Slide with Towel - 1 x daily - 7 x weekly - 3 sets - 10 reps  Isometric Shoulder Flexion with Ball at Wall - 1 x daily - 7 x weekly - 3 sets - 10 reps  Access Code: [email or web address]  URL: [employer]/  Date: 02/07/2023  Prepared by: Mahala Kanner    Exercises  Forearm Walks on Wall with Resistance Band - 1 x daily - 7 x weekly - 3 sets - 10 reps      Therapeutic Exercise and NMR EXR  [] (33451) Provided verbal/tactile cueing for activities related to strengthening, flexibility, endurance, ROM  for improvements in scapular, scapulothoracic and UE control with self care, reaching, carrying, lifting, house/yardwork, driving/computer work.    [] (23938) Provided verbal/tactile cueing for activities related to improving balance, coordination, kinesthetic sense, posture, motor skill, proprioception  to assist with  scapular, scapulothoracic and UE control with self care, reaching, carrying, lifting, house/yardwork, driving/computer work. Therapeutic Activities:    [] (78958 or 45384) Provided verbal/tactile cueing for activities related to improving balance, coordination, kinesthetic sense, posture, motor skill, proprioception and motor activation to allow for proper function of scapular, scapulothoracic and UE control with self care, carrying, lifting, driving/computer work.      Home Exercise Program:    [x] (52026) Reviewed/Progressed HEP activities related to strengthening, flexibility, endurance, ROM of scapular, scapulothoracic and UE control with self care, reaching, carrying, lifting, house/yardwork, driving/computer work  [] (62024) Reviewed/Progressed HEP activities related to improving balance, coordination, kinesthetic sense, posture, motor skill, proprioception of scapular, scapulothoracic and UE control with self care, reaching, carrying, lifting, house/yardwork, driving/computer work      Manual Treatments:  PROM / STM / Oscillations-Mobs:  G-I, II, III, IV (PA's, Inf., Post.)  [] (76686) Provided manual therapy to mobilize soft tissue/joints of cervical/CT, scapular GHJ and UE for the purpose of modulating pain, promoting relaxation,  increasing ROM, reducing/eliminating soft tissue swelling/inflammation/restriction, improving soft tissue extensibility and allowing for proper ROM for normal function with self care, reaching, carrying, lifting, house/yardwork, driving/computer work    Charges:  Timed Code Treatment Minutes: 45   Total Treatment Minutes: 50       [] EVAL (LOW) 00228 (typically 20 minutes face-to-face)  [] EVAL (MOD) 95457 (typically 30 minutes face-to-face)  [] EVAL (HIGH) 19861 (typically 45 minutes face-to-face)  [] RE-EVAL     [x] PQ(56989) x  1  [] Dry needle 1 or 2 Muscles (22973)  [x] NMR (57476) x 1    [] Dry needle 3+ Muscles (54112)  [x] Manual (80871) x1     [] Ultrasound (43624) x  [] TA (27915) x     [] Mech Traction (35901)  [] ES(attended) (58082)     [] ES (un) (60475):   [] Vasopump (77258) [] Ionto (43960)   [] Other:    If [facility] Please Indicate Time In/Out  CPT Code Time in Time out                                   Approval Dates:  CPT Code Units Approved Units Used  Date Updated:                     GOALS:  Patient stated goal: \" I want to be able to use my arm better \"  [x] Progressing: [] Met: [] Not Met: [] Adjusted     Therapist goals for Patient:   Short Term Goals: To be achieved in: 2 weeks  1. Independent in HEP and progression per patient tolerance, in order to prevent re-injury. [x] Progressing: [] Met: [] Not Met: [] Adjusted  2.  Patient will have a decrease in pain to facilitate improvement in movement, function, and ADLs as indicated by Functional Deficits. [x] Progressing: [] Met: [] Not Met: [] Adjusted     Long Term Goals: To be achieved in: 12 weeks  1. Increase FOTO functional outcome score from 68 to 72  to assist with reaching prior level of function. [x] Progressing: [] Met: [] Not Met: [] Adjusted  2. Patient will demonstrate increased AROM to 75% wfl to allow for proper joint functioning as indicated by Functional Deficits. [x] Progressing: [] Met: [] Not Met: [] Adjusted  3. Patient will demonstrate an increase in NM recruitment/activation and overall GH and scapular strength to within n5lbs HHD or WNL for proper functional mobility as indicated by patients Functional Deficits. [x] Progressing: [] Met: [] Not Met: [] Adjusted  4. Patient will return to adl's  activities without increased symptoms or restriction. [x] Progressing: [] Met: [] Not Met: [] Adjusted  5. (patient specific functional goal)       [] Progressing: [] Met: [] Not Met: [] Adjusted    ASSESSMENT:  Tolerated treatment well. Moderate fatigue with ER strengthening,improving cervical ROM. Treatment/Activity Tolerance:  [x] Patient tolerated treatment well [] Patient limited by fatique  [] Patient limited by pain  [] Patient limited by other medical complications  [] Other:     Overall Progression Towards Functional goals/ Treatment Progress Update:  [x] Patient is progressing as expected towards functional goals listed. [] Progression is slowed due to complexities/Impairments listed. [] Progression has been slowed due to co-morbidities.   [] Plan just implemented, too soon to assess goals progression <30days   [] Goals require adjustment due to lack of progress  [] Patient is not progressing as expected and requires additional follow up with physician  [] Other    Prognosis for POC: [x] Good [] Fair  [] Poor    Patient requires continued skilled intervention: [x] Yes  [] No      PLAN:UE arom, aarom, prom, strengthening, scapular strength, myofascial release, joint mobs to gh, sc, ac, scapular thoracic, modalities for pain, hep, rom is good overall, needs a lot of work on scap and rtc/shoulder strength, limited left rot in cerv    [] Continue per plan of care [] Alter current plan (see comments)  [x] Plan of care initiated [] Hold pending MD visit [] Discharge    Electronically signed by: Alirio Dawkins PT     Note: If patient does not return for scheduled/recommended follow up visits, this note will serve as a discharge from care along with the most recent update on progress.

## 2023-02-16 ENCOUNTER — HOSPITAL ENCOUNTER (OUTPATIENT)
Dept: PHYSICAL THERAPY | Age: 68
Setting detail: THERAPIES SERIES
Discharge: HOME | End: 2023-02-16
Payer: MEDICARE

## 2023-02-16 PROCEDURE — 97110 THERAPEUTIC EXERCISES: CPT

## 2023-02-16 PROCEDURE — 97112 NEUROMUSCULAR REEDUCATION: CPT

## 2023-02-16 PROCEDURE — 97140 MANUAL THERAPY 1/> REGIONS: CPT

## 2023-02-16 NOTE — FLOWSHEET NOTE
[address] [facility] [address] [address]  Phone: [phone]   Fax:     [phone]        Physical Therapy Treatment Note/ Progress Report:       Date:  2023    Patient Name:  Pierce Hurtado    :  1955  MRN: [id number]    Pertinent Medical History:Additional Pertinent Hx: Right rtc repair 2017, anxiety, depression, lumpectomy x 3, hpl    Medical/Treatment Diagnosis Information:  Medical Diagnosis: Other specific arthropathies, not elsewhere classified, right shoulder [M12.811]  Treatment Diagnosis: decreased ability to use right ue for adl's    Insurance/Certification information:  PT Insurance Information: [employer]  Physician Information:  George Salcedo MD  Plan of care signed (Y/N): routed    Date of Patient follow up with Physician:      Progress Report: []  Yes  [x]  No     Date Range for reporting period:  Beginnin2023  Ending:      Progress report due (10 Rx/or 30 days whichever is less): 29     Recertification due (POC duration/ or 90 days whichever is less):      Visit # POC/Insurance Allowable Auth Needed   512 12 visits by  [x]Yes    []No     Functional Outcomes Measure:   Date Assessed:  Test: uefi-68  Score: 76 on 23    Pain level:  1-2/10     History of Injury: Patient is a 74 y/o female with a hx of right sholder pain and decreased function since December. She did have a right rtc repair in . She c/o constant aching pain in her right traps and shoulder that get worse with use of her arm especially overhead. She can't reach without increased pain. She wakes from pain at night. She got a cortisone shot that helped some. SUBJECTIVE:  Pt states, \" I can't reach overhead \"  23  Pt states, \" Moving better \"  23 Patient report shoulder is improving able to reach with less pain. Doing well with HEP.  23  Pt states, \" A little sore from working out and doing things around the house \"  2/16/23  Pt states, \" I am able to clean the house \"    OBJECTIVE:      CERV ROM       Cervical Flexion 30     Cervical Extension 20     Cervical SB 15 bilat     Cervical rotation R-50, left-30             ROM Left Right   Shoulder Flex Wfl all  150   Shoulder Abd   130   Shoulder ER   50   Shoulder IR   50                   Strength  Left Right   Shoulder Flex 5/5 3+/5 all   Shoulder Scap   3+   Shoulder ER   3+   Shoulder IR   3+   Scap strength   3+        RESTRICTIONS/PRECAUTIONS:     Exercises/Interventions:   Therapeutic Ex (35879)  Min:15 Resistance/Reps Cues/Notes   hep initiated    Nu step X 5 min    Isos all Towards end range x 10 ea    Sl er 2# x 30    Ceiling punch 3# x 30    Sl scap retract X 30    Prone scap retraction X 30    Prone scap add X 30    Prone ue raises X 30                        Manual Intervention  (15700)  Min:15 Cerv left rot mobs gr 3, prom left cerv rot, mfr to rtc muscles , traps, levator, prom all shoulder motions              NMR re-education (22955)  Min:15     Tband bilat ext Blue x 30    Tband row Blue x 20    Shoulder flex Yellow x 30    w Red 2 x 10    lawnmower 5# x 30    Er wall walk Yellow x 2 min    Ball on wall X 2 min at 90    scaption 1#X 30    Body blade X 2 min    Wall push up X 30         Therapeutic Activity (92430)  Min: Discussed mechanism of injury, anatomy, physiology, biomechanics, sleeping positions,  and strategies to accelerate the healing process. Answered all of patients questions regarding injury. Gave necessary information to get any equipment needed. Modalities:  Min                 Other Therapeutic Activities:  Pt was educated on PT POC, Diagnosis, Prognosis, pathomechanics as well as frequency and duration of scheduling future physical therapy appointments. Time was also taken on this day to answer all patient questions and participation in PT.  Reviewed appointment policy in detail with patient and patient verbalized understanding. Home Exercise Program:Patient demonstrated proper technique, good tolerance,  and was given written instructions for the above exercises  Access Code: [email or web address]  URL: [employer]/  Date: 02/03/2023  Prepared by: Smita Latesha    Exercises  Seated Scapular Retraction - 1 x daily - 7 x weekly - 3 sets - 10 reps  Seated Cervical Rotation AROM - 1 x daily - 7 x weekly - 3 sets - 10 reps  Scaption Wall Slide with Towel - 1 x daily - 7 x weekly - 3 sets - 10 reps  Isometric Shoulder Flexion with Ball at Wall - 1 x daily - 7 x weekly - 3 sets - 10 reps  Access Code: [email or web address]  URL: [employer]/  Date: 02/07/2023  Prepared by: Smita Latesha    Exercises  Forearm Walks on Wall with Resistance Band - 1 x daily - 7 x weekly - 3 sets - 10 reps      Therapeutic Exercise and NMR EXR  [] (26433) Provided verbal/tactile cueing for activities related to strengthening, flexibility, endurance, ROM  for improvements in scapular, scapulothoracic and UE control with self care, reaching, carrying, lifting, house/yardwork, driving/computer work.    [] (82879) Provided verbal/tactile cueing for activities related to improving balance, coordination, kinesthetic sense, posture, motor skill, proprioception  to assist with  scapular, scapulothoracic and UE control with self care, reaching, carrying, lifting, house/yardwork, driving/computer work. Therapeutic Activities:    [] (94593 or 40056) Provided verbal/tactile cueing for activities related to improving balance, coordination, kinesthetic sense, posture, motor skill, proprioception and motor activation to allow for proper function of scapular, scapulothoracic and UE control with self care, carrying, lifting, driving/computer work.      Home Exercise Program:    [x] (40380) Reviewed/Progressed HEP activities related to strengthening, flexibility, endurance, ROM of scapular, scapulothoracic and UE control with self care, reaching, carrying, lifting, house/yardwork, driving/computer work  [] (20697) Reviewed/Progressed HEP activities related to improving balance, coordination, kinesthetic sense, posture, motor skill, proprioception of scapular, scapulothoracic and UE control with self care, reaching, carrying, lifting, house/yardwork, driving/computer work      Manual Treatments:  PROM / STM / Oscillations-Mobs:  G-I, II, III, IV (PA's, Inf., Post.)  [] ([address]) Provided manual therapy to mobilize soft tissue/joints of cervical/CT, scapular GHJ and UE for the purpose of modulating pain, promoting relaxation,  increasing ROM, reducing/eliminating soft tissue swelling/inflammation/restriction, improving soft tissue extensibility and allowing for proper ROM for normal function with self care, reaching, carrying, lifting, house/yardwork, driving/computer work    Charges:  Timed Code Treatment Minutes: 45   Total Treatment Minutes: 50       [] EVAL (LOW) 57848 (typically 20 minutes face-to-face)  [] EVAL (MOD) 03886 (typically 30 minutes face-to-face)  [] EVAL (HIGH) 18353 (typically 45 minutes face-to-face)  [] RE-EVAL     [x] FH(96195) x  1  [] Dry needle 1 or 2 Muscles (22734)  [x] NMR (61945) x 1    [] Dry needle 3+ Muscles (36764)  [x] Manual (08593) x1     [] Ultrasound (20131) x  [] TA (97448) x     [] Mech Traction (74710)  [] ES(attended) (67125)     [] ES (un) (78629):   [] Vasopump (57248) [] Ionto (95295)   [] Other:    If [facility] Please Indicate Time In/Out  CPT Code Time in Time out                                   Approval Dates:  CPT Code Units Approved Units Used  Date Updated:                     GOALS:  Patient stated goal: \" I want to be able to use my arm better \"  [] Progressing: [x] Met: [] Not Met: [] Adjusted     Therapist goals for Patient:   Short Term Goals: To be achieved in: 2 weeks  1. Independent in HEP and progression per patient tolerance, in order to prevent re-injury.    [] Progressing: [x] Met: [] Not Met: [] Adjusted  2. Patient will have a decrease in pain to facilitate improvement in movement, function, and ADLs as indicated by Functional Deficits. [] Progressing: [x] Met: [] Not Met: [] Adjusted     Long Term Goals: To be achieved in: 12 weeks  1. Increase FOTO functional outcome score from 68 to 72  to assist with reaching prior level of function. [] Progressing: [x] Met: [] Not Met: [] Adjusted  2. Patient will demonstrate increased AROM to 75% wfl to allow for proper joint functioning as indicated by Functional Deficits. [] Progressing: [x] Met: [] Not Met: [] Adjusted  3. Patient will demonstrate an increase in NM recruitment/activation and overall GH and scapular strength to within n5lbs HHD or WNL for proper functional mobility as indicated by patients Functional Deficits. [] Progressing: [x] Met: [] Not Met: [] Adjusted  4. Patient will return to adl's  activities without increased symptoms or restriction. [] Progressing: [x] Met: [] Not Met: [] Adjusted  5. (patient specific functional goal)       [] Progressing: [] Met: [] Not Met: [] Adjusted    ASSESSMENT:  Tolerated treatment well. Moderate fatigue with ER strengthening,improving cervical ROM. Treatment/Activity Tolerance:  [x] Patient tolerated treatment well [] Patient limited by fatique  [] Patient limited by pain  [] Patient limited by other medical complications  [] Other:     Overall Progression Towards Functional goals/ Treatment Progress Update:  [x] Patient is progressing as expected towards functional goals listed. [] Progression is slowed due to complexities/Impairments listed. [] Progression has been slowed due to co-morbidities.   [] Plan just implemented, too soon to assess goals progression <30days   [] Goals require adjustment due to lack of progress  [] Patient is not progressing as expected and requires additional follow up with physician  [] Other    Prognosis for POC: [x] Good [] Fair  [] Poor    Patient requires continued skilled intervention: [x] Yes  [] No      PLAN:UE arom, aarom, prom, strengthening, scapular strength, myofascial release, joint mobs to gh, sc, ac, scapular thoracic, modalities for pain, hep, rom is good overall, needs a lot of work on scap and rtc/shoulder strength, limited left rot in cerv  2/16/23  Progressing well, will continue at home    [] Continue per plan of care [] Alter current plan (see comments)  [] Plan of care initiated [] Hold pending MD visit [x] Discharge    Electronically signed by: Shweta Man PT     Note: If patient does not return for scheduled/recommended follow up visits, this note will serve as a discharge from care along with the most recent update on progress.

## 2023-02-28 NOTE — FLOWSHEET NOTE
S/p right reverse TSA DOS 9/13/22    LOV 1/26/23 satisfactory progress following TSA, referred to hand specialist for ulnar neuropathy    S/p cubital tunnel release right in-situ DOS 2/21/23 with Dr. Del Castillo    Called patient to discuss PT and shoulder pain. Patient states last Monday 2/20 she was cleaning her floors, developed increased shoulder pain and weakness, reports it is sore and tender, occasional burning sensation around the scar, no swelling, bruising, numbness, or tingling. She has used ice, tried some of her exercises without much relief. Patient would like to go back to PT. Informed patient will check with both Dr. Egan and Dr. Del Castillo for guidance on when she can start, given that she recently had elbow surgery, will call back with update. Patient verbalized understanding. No other needs at this time.   Punches            Cable Column   Rows                                 Ext.                                  Lat. Pulldown                                 Biceps                                 Triceps            Modality ES/CP 15' ES/CP 15' Declined   Initials SA  SA  DB

## 2023-03-20 ENCOUNTER — OFFICE VISIT (OUTPATIENT)
Dept: ORTHOPEDIC SURGERY | Age: 68
End: 2023-03-20
Payer: MEDICARE

## 2023-03-20 VITALS — BODY MASS INDEX: 22.32 KG/M2 | WEIGHT: 126 LBS | RESPIRATION RATE: 16 BRPM | HEIGHT: 63 IN

## 2023-03-20 DIAGNOSIS — M12.811 ROTATOR CUFF ARTHROPATHY OF RIGHT SHOULDER: Primary | ICD-10-CM

## 2023-03-20 PROCEDURE — 1036F TOBACCO NON-USER: CPT | Performed by: ORTHOPAEDIC SURGERY

## 2023-03-20 PROCEDURE — G8420 CALC BMI NORM PARAMETERS: HCPCS | Performed by: ORTHOPAEDIC SURGERY

## 2023-03-20 PROCEDURE — 1124F ACP DISCUSS-NO DSCNMKR DOCD: CPT | Performed by: ORTHOPAEDIC SURGERY

## 2023-03-20 PROCEDURE — G8484 FLU IMMUNIZE NO ADMIN: HCPCS | Performed by: ORTHOPAEDIC SURGERY

## 2023-03-20 PROCEDURE — 99213 OFFICE O/P EST LOW 20 MIN: CPT | Performed by: ORTHOPAEDIC SURGERY

## 2023-03-20 PROCEDURE — 1090F PRES/ABSN URINE INCON ASSESS: CPT | Performed by: ORTHOPAEDIC SURGERY

## 2023-03-20 PROCEDURE — G8399 PT W/DXA RESULTS DOCUMENT: HCPCS | Performed by: ORTHOPAEDIC SURGERY

## 2023-03-20 PROCEDURE — G8427 DOCREV CUR MEDS BY ELIG CLIN: HCPCS | Performed by: ORTHOPAEDIC SURGERY

## 2023-03-20 PROCEDURE — 3017F COLORECTAL CA SCREEN DOC REV: CPT | Performed by: ORTHOPAEDIC SURGERY

## 2023-03-20 NOTE — PROGRESS NOTES
CHIEF COMPLAINT: Right shoulder pain    History:    Dylan Galvan is a 75 y.o. right handed female here for right shoulder follow-up. Initial history: self-referred for evaluation and treatment of Right shoulder pain. She had right shoulder arthroscopy, subacromial decompression, and rotator cuff repair by me in 2017. This is evaluated as a personal injury. The pain began 6 weeks ago. Pain is rated as a 4/10. There was not an injury. It is located laterally, anteriorly, posteriorly, trapezius, scapula, triceps. Symptoms are worse with overhead activity, abduction. She does note progressive weakness. The patient has been doing home exercise program that was started when she was seen for left shoulder recently. This has not helped. The patient has not had an injection. The patient has tried NSAIDs with relief. She has tried heat with mild relief. The patient has not tried ice. Patient's occupation is retired. Interval History: Her shoulder is feeling better. She rates pain 2/10. Pain is more just dullness and soreness. Heat does help. She has been to PT at the [place] office. Injection worked very well.         Past Medical History:   Diagnosis Date    Acid reflux     Anxiety     Depression     Deviated septum 10/2022    \"severe on right side\"    Hyperlipidemia     controlled with meds    Ulcer, esophagus 09/2022    healed with meds       Past Surgical History:   Procedure Laterality Date    BREAST LUMPECTOMY      RIGHT BREAST X 3 (BENIGN)    BUNIONECTOMY Left 2008    BUNIONECTOMY Right     COLONOSCOPY      COSMETIC SURGERY  2014    face lift    FOOT SURGERY      SCREWS PLACED IN RIGHT FOOT    HERNIA REPAIR Left 12/20/2019    LEFT INGUINAL HERNIA REPAIR WITH MESH and ON Q PUMP performed by Amanda Wynne MD at [facility] N/A 10/11/2022    SEPTOPLASTY, INFERIOR TURBINATE REDUCTION performed by Mikaela Santiago MD at [facility] ARTHROSCOPY Right 08/04/2017

## 2023-05-31 RX ORDER — CITALOPRAM 20 MG/1
TABLET ORAL
Qty: 90 TABLET | Refills: 1 | Status: SHIPPED | OUTPATIENT
Start: 2023-05-31

## 2023-05-31 RX ORDER — OMEPRAZOLE 20 MG/1
CAPSULE, DELAYED RELEASE ORAL
Qty: 90 CAPSULE | Refills: 1 | Status: SHIPPED | OUTPATIENT
Start: 2023-05-31

## 2023-05-31 RX ORDER — ATORVASTATIN CALCIUM 40 MG/1
TABLET, FILM COATED ORAL
Qty: 90 TABLET | Refills: 1 | Status: SHIPPED | OUTPATIENT
Start: 2023-05-31

## 2023-07-31 ENCOUNTER — HOSPITAL ENCOUNTER (OUTPATIENT)
Dept: WOMENS IMAGING | Age: 68
Discharge: HOME | End: 2023-07-31
Payer: MEDICARE

## 2023-07-31 DIAGNOSIS — Z12.31 VISIT FOR SCREENING MAMMOGRAM: ICD-10-CM

## 2023-07-31 PROCEDURE — 77063 BREAST TOMOSYNTHESIS BI: CPT

## 2023-12-26 ENCOUNTER — OFFICE VISIT (OUTPATIENT)
Dept: FAMILY MEDICINE CLINIC | Age: 68
End: 2023-12-26
Payer: MEDICARE

## 2023-12-26 VITALS
TEMPERATURE: 97.4 F | HEIGHT: 63 IN | WEIGHT: 128 LBS | DIASTOLIC BLOOD PRESSURE: 70 MMHG | SYSTOLIC BLOOD PRESSURE: 120 MMHG | BODY MASS INDEX: 22.68 KG/M2

## 2023-12-26 DIAGNOSIS — B96.89 ACUTE BACTERIAL SINUSITIS: Primary | ICD-10-CM

## 2023-12-26 DIAGNOSIS — J01.90 ACUTE BACTERIAL SINUSITIS: Primary | ICD-10-CM

## 2023-12-26 PROCEDURE — G8484 FLU IMMUNIZE NO ADMIN: HCPCS | Performed by: INTERNAL MEDICINE

## 2023-12-26 PROCEDURE — G8420 CALC BMI NORM PARAMETERS: HCPCS | Performed by: INTERNAL MEDICINE

## 2023-12-26 PROCEDURE — 99213 OFFICE O/P EST LOW 20 MIN: CPT | Performed by: INTERNAL MEDICINE

## 2023-12-26 PROCEDURE — 1090F PRES/ABSN URINE INCON ASSESS: CPT | Performed by: INTERNAL MEDICINE

## 2023-12-26 PROCEDURE — 1036F TOBACCO NON-USER: CPT | Performed by: INTERNAL MEDICINE

## 2023-12-26 PROCEDURE — 3017F COLORECTAL CA SCREEN DOC REV: CPT | Performed by: INTERNAL MEDICINE

## 2023-12-26 PROCEDURE — G8427 DOCREV CUR MEDS BY ELIG CLIN: HCPCS | Performed by: INTERNAL MEDICINE

## 2023-12-26 PROCEDURE — G8399 PT W/DXA RESULTS DOCUMENT: HCPCS | Performed by: INTERNAL MEDICINE

## 2023-12-26 PROCEDURE — 1124F ACP DISCUSS-NO DSCNMKR DOCD: CPT | Performed by: INTERNAL MEDICINE

## 2023-12-26 RX ORDER — CEFUROXIME AXETIL 250 MG/1
250 TABLET ORAL 2 TIMES DAILY
Qty: 20 TABLET | Refills: 0 | Status: SHIPPED | OUTPATIENT
Start: 2023-12-26 | End: 2024-01-05

## 2023-12-26 NOTE — PROGRESS NOTES
Rebel Long (:  1955) is a 70 y.o. female,Established patient, here for evaluation of the following chief complaint(s):  Sinusitis (Patient c/o head congestion, sore throat x 10 days; cough, sneezing. Patient has taken OTC Mucinex, DayQuil, NyQuil and Advil Cold & Sinus. Patient tested negative for Covid-19 today)  Rebel Long is a 70 y.o. female with the [facility]:  Patient Active Problem List    Diagnosis Date Noted    Left inguinal hernia     Complete tear of right rotator cuff 2017    Hypercholesterolemia 2014    Dysphagia 2013    GERD (gastroesophageal reflux disease) 2012    Ulcer, esophagus     Depression     Anxiety      Current Outpatient Medications   Medication Sig Dispense Refill    omeprazole (PRILOSEC) 20 MG delayed release capsule TAKE 1 CAPSULE EVERY DAY 90 capsule 1    atorvastatin (LIPITOR) 40 MG tablet TAKE 1 TABLET EVERY NIGHT AT BEDTIME 90 tablet 1    citalopram (CELEXA) 20 MG tablet TAKE 1 TABLET EVERY DAY 90 tablet 1    NONFORMULARY Indications: marijuana - no medicinal card      fluticasone (FLONASE) 50 MCG/ACT nasal spray 1 spray by Each Nostril route daily 32 g 1    Calcium Carbonate-Vitamin D (CALTRATE 600+D PO) Take 1 tablet by mouth 2 times daily      Biotin 1 MG CAPS Take  by mouth daily. fish oil-omega-3 fatty acids 1000 MG capsule Take 1 capsule by mouth 2 times daily      Multiple Vitamins-Minerals (CENTRUM SILVER PO) Take  by mouth daily. No current facility-administered medications for this visit. Allergies: Patient has no known allergies.   Past Medical History:   Diagnosis Date    Acid reflux     Anxiety     Depression     Deviated septum 10/2022    \"severe on right side\"    Hyperlipidemia     controlled with meds    Ulcer, esophagus 2022    healed with meds     Past Surgical History:   Procedure Laterality Date    BREAST LUMPECTOMY      RIGHT BREAST X 3 (BENIGN)    BUNIONECTOMY Left

## 2024-01-12 ENCOUNTER — OFFICE VISIT (OUTPATIENT)
Dept: FAMILY MEDICINE CLINIC | Age: 69
End: 2024-01-12
Payer: MEDICARE

## 2024-01-12 VITALS
DIASTOLIC BLOOD PRESSURE: 68 MMHG | HEIGHT: 63 IN | BODY MASS INDEX: 22.68 KG/M2 | WEIGHT: 128 LBS | SYSTOLIC BLOOD PRESSURE: 124 MMHG | TEMPERATURE: 98.2 F

## 2024-01-12 DIAGNOSIS — E78.00 HYPERCHOLESTEROLEMIA: ICD-10-CM

## 2024-01-12 DIAGNOSIS — F41.9 ANXIETY: ICD-10-CM

## 2024-01-12 DIAGNOSIS — K21.9 GASTROESOPHAGEAL REFLUX DISEASE, UNSPECIFIED WHETHER ESOPHAGITIS PRESENT: ICD-10-CM

## 2024-01-12 DIAGNOSIS — Z12.11 SCREENING FOR COLON CANCER: ICD-10-CM

## 2024-01-12 DIAGNOSIS — E78.00 HYPERCHOLESTEROLEMIA: Primary | ICD-10-CM

## 2024-01-12 LAB
ALT SERPL-CCNC: 15 U/L (ref 10–40)
AST SERPL-CCNC: 24 U/L (ref 15–37)
CHOLEST SERPL-MCNC: 144 MG/DL (ref 0–199)
HDLC SERPL-MCNC: 66 MG/DL (ref 40–60)
LDLC SERPL CALC-MCNC: 62 MG/DL
TRIGL SERPL-MCNC: 82 MG/DL (ref 0–150)
VLDLC SERPL CALC-MCNC: 16 MG/DL

## 2024-01-12 PROCEDURE — G8420 CALC BMI NORM PARAMETERS: HCPCS | Performed by: INTERNAL MEDICINE

## 2024-01-12 PROCEDURE — 1036F TOBACCO NON-USER: CPT | Performed by: INTERNAL MEDICINE

## 2024-01-12 PROCEDURE — 1124F ACP DISCUSS-NO DSCNMKR DOCD: CPT | Performed by: INTERNAL MEDICINE

## 2024-01-12 PROCEDURE — G8427 DOCREV CUR MEDS BY ELIG CLIN: HCPCS | Performed by: INTERNAL MEDICINE

## 2024-01-12 PROCEDURE — G8399 PT W/DXA RESULTS DOCUMENT: HCPCS | Performed by: INTERNAL MEDICINE

## 2024-01-12 PROCEDURE — G8484 FLU IMMUNIZE NO ADMIN: HCPCS | Performed by: INTERNAL MEDICINE

## 2024-01-12 PROCEDURE — 1090F PRES/ABSN URINE INCON ASSESS: CPT | Performed by: INTERNAL MEDICINE

## 2024-01-12 PROCEDURE — 99214 OFFICE O/P EST MOD 30 MIN: CPT | Performed by: INTERNAL MEDICINE

## 2024-01-12 PROCEDURE — 3017F COLORECTAL CA SCREEN DOC REV: CPT | Performed by: INTERNAL MEDICINE

## 2024-01-12 ASSESSMENT — PATIENT HEALTH QUESTIONNAIRE - PHQ9
4. FEELING TIRED OR HAVING LITTLE ENERGY: 0
SUM OF ALL RESPONSES TO PHQ QUESTIONS 1-9: 0
6. FEELING BAD ABOUT YOURSELF - OR THAT YOU ARE A FAILURE OR HAVE LET YOURSELF OR YOUR FAMILY DOWN: 0
SUM OF ALL RESPONSES TO PHQ QUESTIONS 1-9: 0
2. FEELING DOWN, DEPRESSED OR HOPELESS: 0
SUM OF ALL RESPONSES TO PHQ9 QUESTIONS 1 & 2: 0
8. MOVING OR SPEAKING SO SLOWLY THAT OTHER PEOPLE COULD HAVE NOTICED. OR THE OPPOSITE, BEING SO FIGETY OR RESTLESS THAT YOU HAVE BEEN MOVING AROUND A LOT MORE THAN USUAL: 0
5. POOR APPETITE OR OVEREATING: 0
SUM OF ALL RESPONSES TO PHQ QUESTIONS 1-9: 0
SUM OF ALL RESPONSES TO PHQ QUESTIONS 1-9: 0
9. THOUGHTS THAT YOU WOULD BE BETTER OFF DEAD, OR OF HURTING YOURSELF: 0
10. IF YOU CHECKED OFF ANY PROBLEMS, HOW DIFFICULT HAVE THESE PROBLEMS MADE IT FOR YOU TO DO YOUR WORK, TAKE CARE OF THINGS AT HOME, OR GET ALONG WITH OTHER PEOPLE: 0
1. LITTLE INTEREST OR PLEASURE IN DOING THINGS: 0
7. TROUBLE CONCENTRATING ON THINGS, SUCH AS READING THE NEWSPAPER OR WATCHING TELEVISION: 0
3. TROUBLE FALLING OR STAYING ASLEEP: 0

## 2024-01-12 ASSESSMENT — ENCOUNTER SYMPTOMS
ABDOMINAL PAIN: 0
BLOOD IN STOOL: 0
NAUSEA: 0
SINUS PAIN: 0
SORE THROAT: 0
DIARRHEA: 0
VOMITING: 0
COUGH: 0
RHINORRHEA: 0
APNEA: 0
SINUS PRESSURE: 0
CONSTIPATION: 0
WHEEZING: 0
SHORTNESS OF BREATH: 0

## 2024-01-12 NOTE — PROGRESS NOTES
Ronna Baltazar (:  1955) is a 68 y.o. female,Established patient, here for evaluation of the following chief complaint(s):  Check-Up (Hyperlipidemia, anxiety, GERD- patient request fasting lab order and 90 day medication refills with an additional refill)  Ronna Baltazar is a 68 y.o. female with the following history as recorded in [facility]:  Patient Active Problem List    Diagnosis Date Noted    Left inguinal hernia     Complete tear of right rotator cuff 2017    Hypercholesterolemia 2014    Dysphagia 2013    GERD (gastroesophageal reflux disease) 2012    Ulcer, esophagus     Depression     Anxiety      Current Outpatient Medications   Medication Sig Dispense Refill    COLLAGEN PO Take by mouth daily      omeprazole (PRILOSEC) 20 MG delayed release capsule TAKE 1 CAPSULE EVERY DAY 90 capsule 1    atorvastatin (LIPITOR) 40 MG tablet TAKE 1 TABLET EVERY NIGHT AT BEDTIME 90 tablet 1    citalopram (CELEXA) 20 MG tablet TAKE 1 TABLET EVERY DAY 90 tablet 1    NONFORMULARY Indications: marijuana - no medicinal card      Calcium Carbonate-Vitamin D (CALTRATE 600+D PO) Take 1 tablet by mouth 2 times daily      Biotin 1 MG CAPS Take  by mouth daily.      fish oil-omega-3 fatty acids 1000 MG capsule Take 1 capsule by mouth 2 times daily      Multiple Vitamins-Minerals (CENTRUM SILVER PO) Take  by mouth daily.        fluticasone (FLONASE) 50 MCG/ACT nasal spray 1 spray by Each Nostril route daily (Patient not taking: Reported on 2024) 32 g 1     No current facility-administered medications for this visit.     Allergies: Patient has no known allergies.  Past Medical History:   Diagnosis Date    Acid reflux     Anxiety     Depression     Deviated septum 10/2022    \"severe on right side\"    Hyperlipidemia     controlled with meds    Ulcer, esophagus 2022    healed with meds     Past Surgical History:   Procedure Laterality Date    BREAST LUMPECTOMY      RIGHT BREAST X 3 (BENIGN)    BUNIONECTOMY

## 2024-01-12 NOTE — PROGRESS NOTES
Immunization History   Administered Date(s) Administered    COVID-19, PFIZER Bivalent, DO NOT Dilute, (age 12y+), IM, 30 mcg/0.3 mL 10/25/2022    COVID-19, PFIZER PURPLE top, DILUTE for use, (age 12 y+), 30mcg/0.3mL 03/19/2021, 04/09/2021, 12/19/2021    COVID-19, PFIZER, (2023-24 formula), (age 12y+), IM, 30mcg/0.3mL 09/29/2023    Influenza Vaccine, unspecified formulation 10/31/2017    Influenza Virus Vaccine 11/01/2014, 10/01/2015, 10/28/2016, 10/10/2018    Influenza Whole 11/01/2012, 11/01/2014, 10/01/2015, 10/28/2016    Influenza, FLUARIX, FLULAVAL, FLUZONE (age 6 mo+) AND AFLURIA, (age 3 y+), PF, 0.5mL 10/10/2018    Influenza, FLUBLOK, (age 18 y+), PF, 0.5mL 10/07/2020    Influenza, FLUZONE (age 65 y+), High Dose, 0.7mL 10/19/2021, 10/25/2022, 09/21/2023    Influenza, High Dose (Fluzone 65 yrs and older) 10/02/2019    Pneumococcal, PCV20, PREVNAR 20, (age 6w+), IM, 0.5mL 09/21/2023    Pneumococcal, PPSV23, PNEUMOVAX 23, (age 2y+), SC/IM, 0.5mL 03/04/2016, 05/07/2021    RSV, ABRYSVO, (age 60y+), PF, IM, 0.5mL 09/21/2023    TDaP, ADACEL (age 10y-64y), BOOSTRIX (age 10y+), IM, 0.5mL 06/26/2007, 03/04/2016, 02/07/2021    Zoster Live (Zostavax) 04/23/2015    Zoster Recombinant (Shingrix) 09/21/2023, 01/08/2024

## 2024-01-12 NOTE — PATIENT INSTRUCTIONS
Thank you for choosing [facility], [place].    Please bring a current list of medications to every appointment.    Please contact your pharmacy for any prescription refill(s) that you are requesting.

## 2024-03-14 RX ORDER — ACETAMINOPHEN 500 MG
500 TABLET ORAL EVERY 6 HOURS PRN
COMMUNITY

## 2024-03-14 RX ORDER — CETIRIZINE HYDROCHLORIDE 10 MG/1
10 TABLET ORAL PRN
COMMUNITY

## 2024-03-14 NOTE — PROGRESS NOTES
[facility] ENDOSCOPY COLONOSCOPY PRE-OPERATIVE INSTRUCTIONS    Procedure date__3/20/2024_______  Arrival time___0930_________          Surgery time__1030__________       Clear liquids the day before the procedure. Do not eat or drink anything within 5 hours of your procedure.    This includes water chewing gum, mints and ice chips.   You may brush your teeth and gargle the morning of your surgery, but do not swallow the water    You may be asked to stop blood thinners such as Coumadin, Plavix, Fragmin, Lovenox, etc., or any anti-inflammatories such as:  Aspirin, Ibuprofen, Advil, Naproxen prior to your procedure.   We also ask that you stop any OTC medications such as fish oil, vitamin E, glucosamine, garlic, Multivitamins, COQ 10, etc.    You must make arrangements for a responsible adult to arrive with you and stay in our waiting area during your procedure.  They will also need to take you home after your procedure.    For your safety you will not be allowed to leave alone or drive yourself home.    Also for your safety, it is strongly suggested that someone stay with you the first 24 hours after your procedure.    For your comfort, please wear simple loose fitting clothing to the center.  Please do not bring valuables.      If you have a living will and a durable power of  for healthcare, please bring in a copy.     You will need to bring a photo ID and insurance card    Our goal is to provide you with excellent care so if you have any questions, please contact us at the [facility] at [phone]         Please note these are generalized instructions for all colonoscopy cases, you may be provided with more specific instructions if necessary

## 2024-03-19 ENCOUNTER — ANESTHESIA EVENT (OUTPATIENT)
Dept: ENDOSCOPY | Age: 69
End: 2024-03-19
Payer: MEDICARE

## 2024-03-20 ENCOUNTER — HOSPITAL ENCOUNTER (OUTPATIENT)
Age: 69
Setting detail: OUTPATIENT SURGERY
Discharge: HOME | End: 2024-03-20
Attending: INTERNAL MEDICINE | Admitting: INTERNAL MEDICINE
Payer: MEDICARE

## 2024-03-20 ENCOUNTER — ANESTHESIA (OUTPATIENT)
Dept: ENDOSCOPY | Age: 69
End: 2024-03-20
Payer: MEDICARE

## 2024-03-20 VITALS
TEMPERATURE: 96.5 F | BODY MASS INDEX: 24.24 KG/M2 | OXYGEN SATURATION: 98 % | HEIGHT: 61 IN | WEIGHT: 128.4 LBS | RESPIRATION RATE: 16 BRPM | SYSTOLIC BLOOD PRESSURE: 129 MMHG | HEART RATE: 59 BPM | DIASTOLIC BLOOD PRESSURE: 82 MMHG

## 2024-03-20 DIAGNOSIS — Z12.11 COLON CANCER SCREENING: ICD-10-CM

## 2024-03-20 PROCEDURE — 88305 TISSUE EXAM BY PATHOLOGIST: CPT

## 2024-03-20 PROCEDURE — 7100000011 HC PHASE II RECOVERY - ADDTL 15 MIN: Performed by: INTERNAL MEDICINE

## 2024-03-20 PROCEDURE — 3609010600 HC COLONOSCOPY POLYPECTOMY SNARE/COLD BIOPSY: Performed by: INTERNAL MEDICINE

## 2024-03-20 PROCEDURE — 6360000002 HC RX W HCPCS: Performed by: NURSE ANESTHETIST, CERTIFIED REGISTERED

## 2024-03-20 PROCEDURE — 2580000003 HC RX 258: Performed by: ANESTHESIOLOGY

## 2024-03-20 PROCEDURE — 2709999900 HC NON-CHARGEABLE SUPPLY: Performed by: INTERNAL MEDICINE

## 2024-03-20 PROCEDURE — 3700000001 HC ADD 15 MINUTES (ANESTHESIA): Performed by: INTERNAL MEDICINE

## 2024-03-20 PROCEDURE — 3700000000 HC ANESTHESIA ATTENDED CARE: Performed by: INTERNAL MEDICINE

## 2024-03-20 PROCEDURE — 2500000003 HC RX 250 WO HCPCS: Performed by: NURSE ANESTHETIST, CERTIFIED REGISTERED

## 2024-03-20 PROCEDURE — 7100000010 HC PHASE II RECOVERY - FIRST 15 MIN: Performed by: INTERNAL MEDICINE

## 2024-03-20 RX ORDER — NALOXONE HYDROCHLORIDE 0.4 MG/ML
INJECTION, SOLUTION INTRAMUSCULAR; INTRAVENOUS; SUBCUTANEOUS PRN
Status: CANCELLED | OUTPATIENT
Start: 2024-03-20

## 2024-03-20 RX ORDER — SODIUM CHLORIDE 9 MG/ML
INJECTION, SOLUTION INTRAVENOUS PRN
Status: DISCONTINUED | OUTPATIENT
Start: 2024-03-20 | End: 2024-03-20 | Stop reason: HOSPADM

## 2024-03-20 RX ORDER — SODIUM CHLORIDE 0.9 % (FLUSH) 0.9 %
5-40 SYRINGE (ML) INJECTION PRN
Status: DISCONTINUED | OUTPATIENT
Start: 2024-03-20 | End: 2024-03-20 | Stop reason: HOSPADM

## 2024-03-20 RX ORDER — ONDANSETRON 2 MG/ML
4 INJECTION INTRAMUSCULAR; INTRAVENOUS
Status: CANCELLED | OUTPATIENT
Start: 2024-03-20 | End: 2024-03-21

## 2024-03-20 RX ORDER — LIDOCAINE HYDROCHLORIDE 20 MG/ML
INJECTION, SOLUTION EPIDURAL; INFILTRATION; INTRACAUDAL; PERINEURAL PRN
Status: DISCONTINUED | OUTPATIENT
Start: 2024-03-20 | End: 2024-03-20 | Stop reason: SDUPTHER

## 2024-03-20 RX ORDER — SODIUM CHLORIDE 9 MG/ML
INJECTION, SOLUTION INTRAVENOUS PRN
Status: CANCELLED | OUTPATIENT
Start: 2024-03-20

## 2024-03-20 RX ORDER — SODIUM CHLORIDE 0.9 % (FLUSH) 0.9 %
5-40 SYRINGE (ML) INJECTION EVERY 12 HOURS SCHEDULED
Status: CANCELLED | OUTPATIENT
Start: 2024-03-20

## 2024-03-20 RX ORDER — SODIUM CHLORIDE 0.9 % (FLUSH) 0.9 %
5-40 SYRINGE (ML) INJECTION PRN
Status: CANCELLED | OUTPATIENT
Start: 2024-03-20

## 2024-03-20 RX ORDER — DIPHENHYDRAMINE HYDROCHLORIDE 50 MG/ML
12.5 INJECTION INTRAMUSCULAR; INTRAVENOUS
Status: CANCELLED | OUTPATIENT
Start: 2024-03-20 | End: 2024-03-21

## 2024-03-20 RX ORDER — SODIUM CHLORIDE 0.9 % (FLUSH) 0.9 %
5-40 SYRINGE (ML) INJECTION EVERY 12 HOURS SCHEDULED
Status: DISCONTINUED | OUTPATIENT
Start: 2024-03-20 | End: 2024-03-20 | Stop reason: HOSPADM

## 2024-03-20 RX ORDER — PROPOFOL 10 MG/ML
INJECTION, EMULSION INTRAVENOUS PRN
Status: DISCONTINUED | OUTPATIENT
Start: 2024-03-20 | End: 2024-03-20 | Stop reason: SDUPTHER

## 2024-03-20 RX ADMIN — LIDOCAINE HYDROCHLORIDE 100 MG: 20 INJECTION, SOLUTION EPIDURAL; INFILTRATION; INTRACAUDAL; PERINEURAL at 10:45

## 2024-03-20 RX ADMIN — PROPOFOL 150 MCG/KG/MIN: 10 INJECTION, EMULSION INTRAVENOUS at 10:46

## 2024-03-20 RX ADMIN — PROPOFOL 100 MG: 10 INJECTION, EMULSION INTRAVENOUS at 10:45

## 2024-03-20 RX ADMIN — SODIUM CHLORIDE: 9 INJECTION, SOLUTION INTRAVENOUS at 10:14

## 2024-03-20 ASSESSMENT — ENCOUNTER SYMPTOMS: SHORTNESS OF BREATH: 0

## 2024-03-20 ASSESSMENT — LIFESTYLE VARIABLES: SMOKING_STATUS: 0

## 2024-03-20 ASSESSMENT — PAIN - FUNCTIONAL ASSESSMENT
PAIN_FUNCTIONAL_ASSESSMENT: 0-10
PAIN_FUNCTIONAL_ASSESSMENT: 0-10

## 2024-03-20 NOTE — OP NOTE
Colonoscopy Procedure Note      Patient: Ronna Baltazar  : 1955  Acct#:     Procedure: Colonoscopy with polypectomy (cold snare)    Date:  3/20/2024    Surgeon:  Christos Morgan Jr, DO    Referring Physician:  Roland Shelley DO    Previous Colonoscopy: YES  Date:    Greater than 3 years: YES    Preoperative Diagnosis:  Screening colonoscopy- Average risk     Postoperative Diagnosis:  1) A 3 mm polyp in the ascending colon was removed completely with cold snare polypectomy. 2) A 4 mm polyp in the transverse colon was removed completely with cold snare polypectomy. 3) There was a 1 cm distal transverse colon lipoma noted.  4) Mild left sided diverticulosis was noted.  5) A 4 mm polyp in the sigmoid colon was removed completely with cold snare polypectomy.  6) Large internal hemorrhoids were noted.     Consent:  The patient or their legal guardian has signed a consent, and is aware of the potential risks, benefits, alternatives, and potential complications of this procedure.  These include, but are not limited to hemorrhage, bleeding, post procedural pain, perforation, phlebitis, aspiration, hypotension, hypoxia, cardiovascular events such as arryhthmia, and possibly death.  Additionally, the possibility of missed colonic polyps and interval colon cancer was discussed in the consent.    Anesthesia:  The patient was administered TIVA per anesthesiology team.  Please see their operative records for full details of medications administered.       Procedure:   An informed consent was obtained from the patient after explanation of indications, benefits, possible risks and complications of the procedure.  The patient was then taken to the endoscopy suite, placed in the left lateral decubitus position, and the above IV anesthesia was administered.    A digital rectal examination was performed and revealed negative without mass, lesions or tenderness, internal hemorrhoids noted.      The [employer] video

## 2024-03-20 NOTE — ANESTHESIA POSTPROCEDURE EVALUATION
Department of Anesthesiology  Postprocedure Note    Patient: Ronna Baltazar  MRN: [id number]  YOB: 1955  Date of evaluation: 3/20/2024    Procedure Summary       Date: 03/20/24 Room / Location: [place] [id number] / [facility]    Anesthesia Start: 1041 Anesthesia Stop: 1103    Procedure: COLONOSCOPY POLYPECTOMY SNARE/COLD BIOPSY Diagnosis:       Colon cancer screening      (Colon cancer screening [Z12.11])    Surgeons: Christos Morgan Jr., DO Responsible Provider: Benigno Lira MD    Anesthesia Type: MAC ASA Status: 2            Anesthesia Type: No value filed.    Kervin Phase I: Kervin Score: 10    Kervin Phase II: Kervin Score: 10    Anesthesia Post Evaluation    Patient location during evaluation: bedside  Patient participation: complete - patient participated  Level of consciousness: awake and alert  Pain score: 0  Nausea & Vomiting: no nausea  Cardiovascular status: hemodynamically stable  Respiratory status: acceptable  Hydration status: stable  Pain management: adequate    No notable events documented.

## 2024-03-20 NOTE — ANESTHESIA PRE PROCEDURE
Department of Anesthesiology  Preprocedure Note       Name:  Ronna Baltazar   Age:  68 y.o.  :  1955                                          MRN:  [id number]         Date:  3/20/2024      Surgeon: Surgeon(s):  Christos Morgan Jr., DO    Procedure: Procedure(s):  COLORECTAL CANCER SCREENING, NOT HIGH RISK    Medications prior to admission:   Prior to Admission medications    Medication Sig Start Date End Date Taking? Authorizing Provider   dextromethorphan-guaiFENesin (MUCINEX DM)  MG per extended release tablet Take 1 tablet by mouth every 12 hours as needed for Cough   Yes Placido Dumont MD   ibuprofen (ADVIL;MOTRIN) 100 MG/5ML suspension Take by mouth every 4 hours as needed for Fever   Yes Placido Dumont MD   acetaminophen (TYLENOL) 500 MG tablet Take 1 tablet by mouth every 6 hours as needed for Pain   Yes Placido Dumont MD   cetirizine (ZYRTEC) 10 MG tablet Take 1 tablet by mouth as needed for Allergies   Yes Placido Dumont MD   COLLAGEN PO Take by mouth daily    Placido Dumont MD   omeprazole (PRILOSEC) 20 MG delayed release capsule TAKE 1 CAPSULE EVERY DAY 23   Roland Shelley DO   atorvastatin (LIPITOR) 40 MG tablet TAKE 1 TABLET EVERY NIGHT AT BEDTIME 23   Roland Shelley DO   citalopram (CELEXA) 20 MG tablet TAKE 1 TABLET EVERY DAY 23   Roland Shelley DO   NONFORMULARY Indications: marijuana - no medicinal card    Placido Dumont MD   fluticasone (FLONASE) 50 MCG/ACT nasal spray 1 spray by Each Nostril route daily  Patient not taking: Reported on 2024   Mathew Byrnes MD   Calcium Carbonate-Vitamin D (CALTRATE 600+D PO) Take 1 tablet by mouth 2 times daily    Placido Dumont MD   Biotin 1 MG CAPS Take  by mouth daily.    Placido Dumont MD   fish oil-omega-3 fatty acids 1000 MG capsule Take 1 capsule by mouth 2 times daily    Placido Dumont MD   Multiple Vitamins-Minerals (CENTRUM SILVER PO)

## 2024-03-20 NOTE — H&P
Pre-operative History and Physical    Patient: Ronna Baltazar  : 1955  Acct#:     Intended Procedure:  Colonoscopy    HISTORY OF PRESENT ILLNESS:  The patient is a 68 y.o. female  who presents for/due to Screening colonoscopy- Average risk.  Prior colonoscopy in .        Past Medical History:        Diagnosis Date    Acid reflux     Anxiety     Depression     Deviated septum 10/2022    \"severe on right side\"    Hyperlipidemia     controlled with meds    Ulcer, esophagus 2022    healed with meds     Past Surgical History:        Procedure Laterality Date    BREAST LUMPECTOMY      RIGHT BREAST X 3 (BENIGN)    BUNIONECTOMY Left     BUNIONECTOMY Right     COLONOSCOPY      COSMETIC SURGERY      face lift    FOOT SURGERY      SCREWS PLACED IN RIGHT FOOT    HERNIA REPAIR Left 2019    LEFT INGUINAL HERNIA REPAIR WITH MESH and ON Q PUMP performed by Seth Rainey MD at [facility] OR    SEPTOPLASTY N/A 10/11/2022    SEPTOPLASTY, INFERIOR TURBINATE REDUCTION performed by Mathew Byrnes MD at [facility] OR    SHOULDER ARTHROSCOPY Right 2017    TUBAL LIGATION  1985     Medications Prior to Admission:   No current facility-administered medications on file prior to encounter.     Current Outpatient Medications on File Prior to Encounter   Medication Sig Dispense Refill    dextromethorphan-guaiFENesin (MUCINEX DM)  MG per extended release tablet Take 1 tablet by mouth every 12 hours as needed for Cough      ibuprofen (ADVIL;MOTRIN) 100 MG/5ML suspension Take by mouth every 4 hours as needed for Fever      acetaminophen (TYLENOL) 500 MG tablet Take 1 tablet by mouth every 6 hours as needed for Pain      cetirizine (ZYRTEC) 10 MG tablet Take 1 tablet by mouth as needed for Allergies      COLLAGEN PO Take by mouth daily      omeprazole (PRILOSEC) 20 MG delayed release capsule TAKE 1 CAPSULE EVERY DAY 90 capsule 1    atorvastatin (LIPITOR) 40 MG tablet TAKE 1 TABLET EVERY NIGHT AT BEDTIME

## 2024-03-20 NOTE — DISCHARGE INSTRUCTIONS
Recommendations:  Await pathology.    The patient had colon polyp(s) successfully removed today. Theres is a small risk for these sites to bleed for up to several weeks out from the procedure. The patient is instructed to call if there is any significant amounts of  rectal bleeding, abdominal pain, dizziness, or other complaints thought to be related to the procedure.      The patient is recommended to have a repeat colonoscopy in 5 years per current screening/surveillance guidelines.      Discharge Instructions for Colonoscopy     Colonoscopy is a visual exam of the lining of the large intestine, also called the bowel or colon, with a colonoscope. A colonoscope is a flexible tube with a light and a viewing device. It allows the doctor to view the inside of the colon through a tiny video camera.     Colonoscopy is performed for many reasons: unexplained anemia , pain, diarrhea , bloody stools, cancer screening, among many other reasons.     Complications from a colonoscopy are rare. Some possible serious complications include perforated bowel (which might require surgery) and bleeding (which could require blood transfusion ). Minor complications include bloating, gas, and cramping that can last for 1-2 days after the procedure.     Because air is put into your colon during the procedure, it is normal to pass large amounts of air from your rectum. You may not have a bowel movement for 1-3 days after the procedure.     What You Will Need:  Someone to drive you home after the procedure     Steps to Take:  Home Care -  Rest when you get home.   Because the sedative will make you drowsy, don't drive, operate machinery, or make important decisions the day of the procedure.  Feelings of bloating, gas, or cramping may persist for 24 hours.   Diet -  Try sips of water first. If tolerated, resume bland food (scrambled eggs, toast, soup) first.  If tolerated, resume regular diet or the diet recommended by your physician.   Do

## 2024-03-29 RX ORDER — CITALOPRAM 20 MG/1
TABLET ORAL
Qty: 90 TABLET | Refills: 2 | Status: SHIPPED | OUTPATIENT
Start: 2024-03-29

## 2024-03-29 RX ORDER — OMEPRAZOLE 20 MG/1
CAPSULE, DELAYED RELEASE ORAL
Qty: 90 CAPSULE | Refills: 2 | Status: SHIPPED | OUTPATIENT
Start: 2024-03-29

## 2024-03-29 RX ORDER — ATORVASTATIN CALCIUM 40 MG/1
TABLET, FILM COATED ORAL
Qty: 90 TABLET | Refills: 2 | Status: SHIPPED | OUTPATIENT
Start: 2024-03-29

## 2024-06-26 ENCOUNTER — OFFICE VISIT (OUTPATIENT)
Dept: FAMILY MEDICINE CLINIC | Age: 69
End: 2024-06-26
Payer: MEDICARE

## 2024-06-26 VITALS
HEIGHT: 61 IN | TEMPERATURE: 98 F | DIASTOLIC BLOOD PRESSURE: 86 MMHG | BODY MASS INDEX: 23.79 KG/M2 | WEIGHT: 126 LBS | SYSTOLIC BLOOD PRESSURE: 138 MMHG

## 2024-06-26 DIAGNOSIS — F41.9 ANXIETY: Primary | ICD-10-CM

## 2024-06-26 PROCEDURE — 1036F TOBACCO NON-USER: CPT

## 2024-06-26 PROCEDURE — G8427 DOCREV CUR MEDS BY ELIG CLIN: HCPCS

## 2024-06-26 PROCEDURE — G8399 PT W/DXA RESULTS DOCUMENT: HCPCS

## 2024-06-26 PROCEDURE — 3017F COLORECTAL CA SCREEN DOC REV: CPT

## 2024-06-26 PROCEDURE — 1124F ACP DISCUSS-NO DSCNMKR DOCD: CPT

## 2024-06-26 PROCEDURE — G8420 CALC BMI NORM PARAMETERS: HCPCS

## 2024-06-26 PROCEDURE — 99213 OFFICE O/P EST LOW 20 MIN: CPT

## 2024-06-26 PROCEDURE — 1090F PRES/ABSN URINE INCON ASSESS: CPT

## 2024-06-26 RX ORDER — ALPRAZOLAM 0.5 MG/1
0.5 TABLET ORAL 2 TIMES DAILY PRN
Qty: 20 TABLET | Refills: 0 | Status: SHIPPED | OUTPATIENT
Start: 2024-06-26 | End: 2024-07-06

## 2024-06-26 RX ORDER — CITALOPRAM 40 MG/1
TABLET ORAL
Qty: 90 TABLET | Refills: 2 | Status: SHIPPED | OUTPATIENT
Start: 2024-06-26

## 2024-06-26 RX ORDER — TRAZODONE HYDROCHLORIDE 50 MG/1
50 TABLET ORAL NIGHTLY PRN
Qty: 30 TABLET | Refills: 5 | Status: SHIPPED | OUTPATIENT
Start: 2024-06-26

## 2024-06-26 ASSESSMENT — ENCOUNTER SYMPTOMS
BLOOD IN STOOL: 0
APNEA: 0
DIARRHEA: 0
WHEEZING: 0
NAUSEA: 0
SORE THROAT: 0
SHORTNESS OF BREATH: 0
ABDOMINAL PAIN: 0
VOMITING: 0
BACK PAIN: 0
SINUS PRESSURE: 0
COLOR CHANGE: 0
TROUBLE SWALLOWING: 0
CONSTIPATION: 0
COUGH: 0

## 2024-06-26 NOTE — PROGRESS NOTES
Ronna Baltazar (:  1955) is a 68 y.o. female,Established patient, here for evaluation of the following chief complaint(s):  Established New Doctor (Anxiety, not sleeping - family issues)      ASSESSMENT/PLAN:  1. Anxiety  Assessment & Plan:   Uncontrolled, increase celexa to 40 mg, initiate trazodone PRN nightly for insomnia. Since patient is going on vacation next week, new celexa dose may not take effect, will send in 10 days of xanax to get through the trip.  PDMP reviewed.   Pt consent form signed and she verbalized understanding.  Discussed deep breathing, medication, exercise and healthy eating to manage anxiety as well.  Orders:  -     citalopram (CELEXA) 40 MG tablet; TAKE 1 TABLET EVERY DAY, Disp-90 tablet, R-2Normal  -     traZODone (DESYREL) 50 MG tablet; Take 1 tablet by mouth nightly as needed for Sleep, Disp-30 tablet, R-5Normal  -     ALPRAZolam (XANAX) 0.5 MG tablet; Take 1 tablet by mouth 2 times daily as needed for Sleep or Anxiety for up to 10 days. Max Daily Amount: 1 mg, Disp-20 tablet, R-0Normal      Return in about 7 months (around 2025) for check in fasting.    SUBJECTIVE/OBJECTIVE:  Ronna presents with concerns of worsening anxiety, insomnia for the past few weeks.     Her  is being evaluated for bone lesions, outcome still unclear. She is dealing with these family issues to the best of her ability.    Currently on celexa 20 mg. States she has been stable for a long time on this, but now having anxiety daily, crying several times a day, insomnia at night.   She has not tried anything for sleep.    She is going on a vacation with family next week and worried she will \"ruin the trip\" because of her anxiety. She has been given xanax in the past for a vacation and states this was very helpful. Denies wanting any long term medications like this, she is hoping to get this back in control in about 1 month.    Denies SI/HI. Denies harming herself or others.      Past Medical History:

## 2024-06-26 NOTE — ASSESSMENT & PLAN NOTE
Uncontrolled, increase celexa to 40 mg, initiate trazodone PRN nightly for insomnia. Since patient is going on vacation next week, new celexa dose may not take effect, will send in 10 days of xanax to get through the trip.  PDMP reviewed.   Pt consent form signed and she verbalized understanding.  Discussed deep breathing, medication, exercise and healthy eating to manage anxiety as well.

## 2024-08-19 ENCOUNTER — TELEPHONE (OUTPATIENT)
Dept: FAMILY MEDICINE CLINIC | Age: 69
End: 2024-08-19

## 2024-08-19 NOTE — TELEPHONE ENCOUNTER
SW patient regarding this information. She expressed understanding. She will call tomorrow if BP still high to schedule an appointment.

## 2024-08-19 NOTE — TELEPHONE ENCOUNTER
Patient recently started seeing a chiropractor for neck adjustments due to tight shoulders and neck. Patient did not have any pain. Patient states they took her BP last Thursday it was 150/70, today it was 160/60. She was told to call her PCP office to advise.   Patient is asymptomatic.

## 2024-08-19 NOTE — TELEPHONE ENCOUNTER
I would have patient monitor salt intake today and push water intake, if BP is still running high tomorrow please make appt in office to discuss

## 2024-09-06 ENCOUNTER — OFFICE VISIT (OUTPATIENT)
Dept: FAMILY MEDICINE CLINIC | Age: 69
End: 2024-09-06
Payer: MEDICARE

## 2024-09-06 VITALS
WEIGHT: 126.4 LBS | SYSTOLIC BLOOD PRESSURE: 142 MMHG | BODY MASS INDEX: 23.86 KG/M2 | HEIGHT: 61 IN | DIASTOLIC BLOOD PRESSURE: 70 MMHG

## 2024-09-06 DIAGNOSIS — M25.562 CHRONIC PAIN OF LEFT KNEE: Primary | ICD-10-CM

## 2024-09-06 DIAGNOSIS — R03.0 ELEVATED BLOOD PRESSURE READING: ICD-10-CM

## 2024-09-06 DIAGNOSIS — G89.29 CHRONIC PAIN OF LEFT KNEE: Primary | ICD-10-CM

## 2024-09-06 PROCEDURE — G8399 PT W/DXA RESULTS DOCUMENT: HCPCS

## 2024-09-06 PROCEDURE — 99213 OFFICE O/P EST LOW 20 MIN: CPT

## 2024-09-06 PROCEDURE — 1090F PRES/ABSN URINE INCON ASSESS: CPT

## 2024-09-06 PROCEDURE — 1036F TOBACCO NON-USER: CPT

## 2024-09-06 PROCEDURE — 1124F ACP DISCUSS-NO DSCNMKR DOCD: CPT

## 2024-09-06 PROCEDURE — G8427 DOCREV CUR MEDS BY ELIG CLIN: HCPCS

## 2024-09-06 PROCEDURE — G8420 CALC BMI NORM PARAMETERS: HCPCS

## 2024-09-06 PROCEDURE — 3017F COLORECTAL CA SCREEN DOC REV: CPT

## 2024-09-06 ASSESSMENT — ENCOUNTER SYMPTOMS
TROUBLE SWALLOWING: 0
BACK PAIN: 0
SINUS PRESSURE: 0
VOMITING: 0
SHORTNESS OF BREATH: 0
DIARRHEA: 0
ABDOMINAL PAIN: 0
WHEEZING: 0
COLOR CHANGE: 0
NAUSEA: 0
BLOOD IN STOOL: 0
CONSTIPATION: 0
APNEA: 0
SORE THROAT: 0
COUGH: 0

## 2024-09-06 NOTE — PATIENT INSTRUCTIONS
Thank you for choosing [facility].    Please bring a current list of medications to every appointment.    Please contact your pharmacy for any prescription refill(s) that you are requesting.

## 2024-09-06 NOTE — PROGRESS NOTES
Ronna Baltazar (:  1955) is a 69 y.o. female,Established patient, here for evaluation of the following chief complaint(s):  Hypertension (Patient c/o elevated blood pressure readings- 134/85-91) and Knee Pain (Patient c/o left knee pain- worse over the past 2 months.  Patient denies injury and has taken OTC Advil and Tylenol Arthritis)      ASSESSMENT/PLAN:  1. Chronic pain of left knee  Assessment & Plan:    Crepitus on exam. Worsening knee pain, refer to Dr. Holden for eval  Orders:  -     Day Mcadams MD, Orthopedic Surgery (Trauma; Knee; Shoulder), [facility]  2. Elevated blood pressure reading  Assessment & Plan:    BP slightly elevated in office, no acute symptoms. HR RRR in office. Recommend logging BP for the next week at home and will report findings, will decide if medication management is warranted. Continue to monitor diet and drink plenty of water. Pt voiced understanding      No follow-ups on file.    SUBJECTIVE/OBJECTIVE:    Ronna presents today for concern of elevated blood pressure readings at chiropractor.  No hx of HTN, no current medicines for this.  She states BP was running 130s/90s.   Denies SOB, CP, leg swelling. Denies vision changes  Mild headache intermittently but has bad allergies, relates to sinuses  No change in diet. Good water drinker  She is active at home.  Not adding salt in diet, or eating out regularly  BP slightly elevated in office 142/70      She also presents with worsening left knee pain  Worse over the past 2 months.    Notices some mild swelling, denies erythema.  Patient denies injury or falls  she has taken OTC Advil and Tylenol Arthritis with some relief  Walks on treadmill regularly but states this is getting harder to do        Past Medical History:   Diagnosis Date    Acid reflux     Anxiety     Depression     Deviated septum 10/2022    \"severe on right side\"    Hyperlipidemia     controlled with meds    Ulcer, esophagus 2022    healed with

## 2024-09-06 NOTE — ASSESSMENT & PLAN NOTE
BP slightly elevated in office, no acute symptoms. HR RRR in office. Recommend logging BP for the next week at home and will report findings, will decide if medication management is warranted. Continue to monitor diet and drink plenty of water. Pt voiced understanding

## 2024-09-18 ENCOUNTER — TELEPHONE (OUTPATIENT)
Dept: FAMILY MEDICINE CLINIC | Age: 69
End: 2024-09-18

## 2024-09-18 ENCOUNTER — HOSPITAL ENCOUNTER (OUTPATIENT)
Dept: WOMENS IMAGING | Age: 69
Discharge: HOME | End: 2024-09-18
Payer: MEDICARE

## 2024-09-18 VITALS — BODY MASS INDEX: 21.51 KG/M2 | HEIGHT: 64 IN | WEIGHT: 126 LBS

## 2024-09-18 DIAGNOSIS — Z12.31 VISIT FOR SCREENING MAMMOGRAM: ICD-10-CM

## 2024-09-18 PROCEDURE — 77063 BREAST TOMOSYNTHESIS BI: CPT

## 2024-09-20 ENCOUNTER — OFFICE VISIT (OUTPATIENT)
Dept: ORTHOPEDIC SURGERY | Age: 69
End: 2024-09-20

## 2024-09-20 VITALS — BODY MASS INDEX: 21.51 KG/M2 | WEIGHT: 126 LBS | HEIGHT: 64 IN

## 2024-09-20 DIAGNOSIS — M17.12 PRIMARY OSTEOARTHRITIS OF LEFT KNEE: ICD-10-CM

## 2024-09-20 DIAGNOSIS — M25.562 LEFT KNEE PAIN, UNSPECIFIED CHRONICITY: Primary | ICD-10-CM

## 2024-09-20 RX ORDER — LIDOCAINE HYDROCHLORIDE 10 MG/ML
4 INJECTION, SOLUTION INFILTRATION; PERINEURAL ONCE
Status: COMPLETED | OUTPATIENT
Start: 2024-09-20 | End: 2024-09-20

## 2024-09-20 RX ORDER — TRIAMCINOLONE ACETONIDE 40 MG/ML
40 INJECTION, SUSPENSION INTRA-ARTICULAR; INTRAMUSCULAR ONCE
Status: COMPLETED | OUTPATIENT
Start: 2024-09-20 | End: 2024-09-20

## 2024-09-20 RX ADMIN — TRIAMCINOLONE ACETONIDE 40 MG: 40 INJECTION, SUSPENSION INTRA-ARTICULAR; INTRAMUSCULAR at 10:19

## 2024-09-20 RX ADMIN — LIDOCAINE HYDROCHLORIDE 4 ML: 10 INJECTION, SOLUTION INFILTRATION; PERINEURAL at 10:19

## 2024-11-12 ENCOUNTER — OFFICE VISIT (OUTPATIENT)
Dept: FAMILY MEDICINE CLINIC | Age: 69
End: 2024-11-12

## 2024-11-12 VITALS
BODY MASS INDEX: 21.51 KG/M2 | WEIGHT: 126 LBS | HEIGHT: 64 IN | DIASTOLIC BLOOD PRESSURE: 62 MMHG | SYSTOLIC BLOOD PRESSURE: 144 MMHG | TEMPERATURE: 98.1 F

## 2024-11-12 DIAGNOSIS — R03.0 ELEVATED BLOOD PRESSURE READING: Primary | ICD-10-CM

## 2024-11-12 ASSESSMENT — ENCOUNTER SYMPTOMS
SINUS PRESSURE: 0
ABDOMINAL PAIN: 0
DIARRHEA: 0
WHEEZING: 0
NAUSEA: 0
APNEA: 0
SORE THROAT: 0
VOMITING: 0
COUGH: 0
COLOR CHANGE: 0
SHORTNESS OF BREATH: 0
TROUBLE SWALLOWING: 0
BACK PAIN: 0
CONSTIPATION: 0
BLOOD IN STOOL: 0

## 2024-11-12 NOTE — PROGRESS NOTES
Ronna Baltazar (:  1955) is a 69 y.o. female,Established patient, here for evaluation of the following chief complaint(s):  Hypertension (BP readings are from 45 min after waking up)      ASSESSMENT/PLAN:  1. Elevated blood pressure reading  Assessment & Plan:     BP slightly elevated in office, no acute symptoms. Overall, blood pressures for past month have been within normal range, mostly sitting 120-130s/60s. I do not see indication for medication management at this time. HR RRR in office. Recommend continue to log BP and check in around January or sooner if blood pressure begins to remain elevated over 140-150 systolically. Continue to monitor diet and drink plenty of water. Plan for lab work in January. Pt voiced understanding      Return if symptoms worsen or fail to improve.    SUBJECTIVE/OBJECTIVE:    Ronna presents for follow up for high blood pressure readings at home.  She has been tracking her blood pressure for about 1 month taking in AM  Typically running 120s/60s-140/78. 1-2 instances of 150 SBP.  Pulses 50-60s.  Denies SOB, CP, leg swelling, headaches.    She is walking daily  Has increased water intake daily  Not adding salt in diet.  No current antihypertensives.  Hx of HLD, anxiety    Past Medical History:   Diagnosis Date    Acid reflux     Anxiety     Depression     Deviated septum 10/2022    \"severe on right side\"    Hyperlipidemia     controlled with meds    Ulcer, esophagus 2022    healed with meds         Current Outpatient Medications   Medication Sig Dispense Refill    citalopram (CELEXA) 40 MG tablet TAKE 1 TABLET EVERY DAY 90 tablet 2    atorvastatin (LIPITOR) 40 MG tablet TAKE 1 TABLET EVERY NIGHT AT BEDTIME 90 tablet 2    omeprazole (PRILOSEC) 20 MG delayed release capsule TAKE 1 CAPSULE EVERY DAY 90 capsule 2    dextromethorphan-guaiFENesin (MUCINEX DM)  MG per extended release tablet Take 1 tablet by mouth every 12 hours as needed for Cough      acetaminophen (TYLENOL)

## 2024-11-12 NOTE — ASSESSMENT & PLAN NOTE
BP slightly elevated in office, no acute symptoms. Overall, blood pressures for past month have been within normal range, mostly sitting 120-130s/60s. I do not see indication for medication management at this time. HR RRR in office. Recommend continue to log BP and check in around January or sooner if blood pressure begins to remain elevated over 140-150 systolically. Continue to monitor diet and drink plenty of water. Plan for lab work in January. Pt voiced understanding

## 2024-11-29 DIAGNOSIS — K21.9 GASTROESOPHAGEAL REFLUX DISEASE, UNSPECIFIED WHETHER ESOPHAGITIS PRESENT: Primary | ICD-10-CM

## 2024-12-02 RX ORDER — ATORVASTATIN CALCIUM 40 MG/1
TABLET, FILM COATED ORAL
Qty: 90 TABLET | Refills: 3 | Status: SHIPPED | OUTPATIENT
Start: 2024-12-02

## 2024-12-02 RX ORDER — CITALOPRAM HYDROBROMIDE 40 MG/1
40 TABLET ORAL DAILY
Qty: 90 TABLET | Refills: 3 | Status: SHIPPED | OUTPATIENT
Start: 2024-12-02

## 2025-01-23 ENCOUNTER — OFFICE VISIT (OUTPATIENT)
Dept: ORTHOPEDIC SURGERY | Age: 70
End: 2025-01-23
Payer: MEDICARE

## 2025-01-23 DIAGNOSIS — M17.12 PRIMARY OSTEOARTHRITIS OF LEFT KNEE: Primary | ICD-10-CM

## 2025-01-23 PROCEDURE — 1090F PRES/ABSN URINE INCON ASSESS: CPT | Performed by: ORTHOPAEDIC SURGERY

## 2025-01-23 PROCEDURE — G8427 DOCREV CUR MEDS BY ELIG CLIN: HCPCS | Performed by: ORTHOPAEDIC SURGERY

## 2025-01-23 PROCEDURE — G8420 CALC BMI NORM PARAMETERS: HCPCS | Performed by: ORTHOPAEDIC SURGERY

## 2025-01-23 PROCEDURE — 99213 OFFICE O/P EST LOW 20 MIN: CPT | Performed by: ORTHOPAEDIC SURGERY

## 2025-01-23 PROCEDURE — 1125F AMNT PAIN NOTED PAIN PRSNT: CPT | Performed by: ORTHOPAEDIC SURGERY

## 2025-01-23 PROCEDURE — 3017F COLORECTAL CA SCREEN DOC REV: CPT | Performed by: ORTHOPAEDIC SURGERY

## 2025-01-23 PROCEDURE — 1036F TOBACCO NON-USER: CPT | Performed by: ORTHOPAEDIC SURGERY

## 2025-01-23 PROCEDURE — 1159F MED LIST DOCD IN RCRD: CPT | Performed by: ORTHOPAEDIC SURGERY

## 2025-01-23 PROCEDURE — G8399 PT W/DXA RESULTS DOCUMENT: HCPCS | Performed by: ORTHOPAEDIC SURGERY

## 2025-01-23 PROCEDURE — 1124F ACP DISCUSS-NO DSCNMKR DOCD: CPT | Performed by: ORTHOPAEDIC SURGERY

## 2025-01-23 NOTE — PROGRESS NOTES
CHIEF COMPLAINT: Left knee pain/ osteoarthritis.    HISTORY:  Ms. Baltazar 69 y.o.  female presents today for f/u. She had left knee cortisone injection on 9/20/2024 with good improvement for few weeks. She was seen on 9/20/2024 as a consultation request from Anita Thomas APRN - NP for evaluation of left knee pain which started to worsen over years.  She is complaining of achy pain, 6/10.  Pain is increase with standing and walking and decrease with rest. Pain is sharp early in the morning with first few steps, dull achy pain by the end of the day. Alleviating factors: rest. No radiation and no numbness and tingling sensation. No other complaint.  No h/o trauma or gout.    Past Medical History:   Diagnosis Date    Acid reflux     Anxiety     Depression     Deviated septum 10/2022    \"severe on right side\"    Hyperlipidemia     controlled with meds    Ulcer, esophagus 09/2022    healed with meds       Past Surgical History:   Procedure Laterality Date    BREAST LUMPECTOMY      RIGHT BREAST X 3 (BENIGN)    BUNIONECTOMY Left 2008    BUNIONECTOMY Right     COLONOSCOPY      COLONOSCOPY N/A 3/20/2024    COLONOSCOPY POLYPECTOMY SNARE/COLD BIOPSY performed by Christos Morgan Jr., DO at [facility] ENDOSCOPY    COSMETIC SURGERY  2014    face lift    FOOT SURGERY      SCREWS PLACED IN RIGHT FOOT    HERNIA REPAIR Left 12/20/2019    LEFT INGUINAL HERNIA REPAIR WITH MESH and ON Q PUMP performed by Seth Rainey MD at [facility] OR    SEPTOPLASTY N/A 10/11/2022    SEPTOPLASTY, INFERIOR TURBINATE REDUCTION performed by Mathew Byrnes MD at [facility] OR    SHOULDER ARTHROSCOPY Right 08/04/2017    TUBAL LIGATION  1985       Social History     Socioeconomic History    Marital status:      Spouse name: Not on file    Number of children: Not on file    Years of education: Not on file    Highest education level: Not on file   Occupational History     Employer: RETIRED   Tobacco Use    Smoking status: Former

## 2025-01-27 DIAGNOSIS — M17.12 PRIMARY OSTEOARTHRITIS OF LEFT KNEE: Primary | ICD-10-CM

## 2025-02-03 ENCOUNTER — TELEPHONE (OUTPATIENT)
Dept: ORTHOPEDIC SURGERY | Age: 70
End: 2025-02-03

## 2025-02-03 NOTE — TELEPHONE ENCOUNTER
General Question     Subject: GEL INJECTION  Patient and /or Facility Request: Ronna Baltazar   Contact Number: [phone]     PATIENT RETURNING CALL ABOUT PA FOR GEL INJECTIONS  SAID VM CUT OFF CALL FROM CLINIC  PLEASE CALL PATIENT TO ADVISE

## 2025-02-03 NOTE — TELEPHONE ENCOUNTER
SW patient. Advised her that her inj did not require PA. Scheduled patient for gel injections the next 3 weeks at [facility].

## 2025-02-05 ENCOUNTER — OFFICE VISIT (OUTPATIENT)
Dept: ORTHOPEDIC SURGERY | Age: 70
End: 2025-02-05
Payer: MEDICARE

## 2025-02-05 VITALS — HEIGHT: 64 IN | BODY MASS INDEX: 21.51 KG/M2 | WEIGHT: 126 LBS

## 2025-02-05 DIAGNOSIS — M17.12 PRIMARY OSTEOARTHRITIS OF LEFT KNEE: Primary | ICD-10-CM

## 2025-02-05 PROCEDURE — 1159F MED LIST DOCD IN RCRD: CPT | Performed by: ORTHOPAEDIC SURGERY

## 2025-02-05 PROCEDURE — 1125F AMNT PAIN NOTED PAIN PRSNT: CPT | Performed by: ORTHOPAEDIC SURGERY

## 2025-02-05 PROCEDURE — 3017F COLORECTAL CA SCREEN DOC REV: CPT | Performed by: ORTHOPAEDIC SURGERY

## 2025-02-05 PROCEDURE — G8420 CALC BMI NORM PARAMETERS: HCPCS | Performed by: ORTHOPAEDIC SURGERY

## 2025-02-05 PROCEDURE — 1036F TOBACCO NON-USER: CPT | Performed by: ORTHOPAEDIC SURGERY

## 2025-02-05 PROCEDURE — G8427 DOCREV CUR MEDS BY ELIG CLIN: HCPCS | Performed by: ORTHOPAEDIC SURGERY

## 2025-02-05 PROCEDURE — 1090F PRES/ABSN URINE INCON ASSESS: CPT | Performed by: ORTHOPAEDIC SURGERY

## 2025-02-05 PROCEDURE — G8399 PT W/DXA RESULTS DOCUMENT: HCPCS | Performed by: ORTHOPAEDIC SURGERY

## 2025-02-05 PROCEDURE — 99213 OFFICE O/P EST LOW 20 MIN: CPT | Performed by: ORTHOPAEDIC SURGERY

## 2025-02-05 PROCEDURE — 1124F ACP DISCUSS-NO DSCNMKR DOCD: CPT | Performed by: ORTHOPAEDIC SURGERY

## 2025-02-05 PROCEDURE — 20610 DRAIN/INJ JOINT/BURSA W/O US: CPT | Performed by: ORTHOPAEDIC SURGERY

## 2025-02-05 NOTE — PROGRESS NOTES
CHIEF COMPLAINT: Left knee pain/ osteoarthritis.    HISTORY:  Ms. Baltazar 69 y.o.  female presents today for f/u. She had left knee cortisone injection on 9/20/2024 with good improvement for few weeks. She was seen on 9/20/2024 as a consultation request from Anita Thoams APRN - NP for evaluation of left knee pain which started to worsen over years.  She is complaining of achy pain, 4/10.  Pain is increase with standing and walking and decrease with rest. Pain is sharp early in the morning with first few steps, dull achy pain by the end of the day. Alleviating factors: rest. No radiation and no numbness and tingling sensation. No other complaint.  No h/o trauma or gout.    Past Medical History:   Diagnosis Date    Acid reflux     Anxiety     Depression     Deviated septum 10/2022    \"severe on right side\"    Hyperlipidemia     controlled with meds    Ulcer, esophagus 09/2022    healed with meds       Past Surgical History:   Procedure Laterality Date    BREAST LUMPECTOMY      RIGHT BREAST X 3 (BENIGN)    BUNIONECTOMY Left 2008    BUNIONECTOMY Right     COLONOSCOPY      COLONOSCOPY N/A 3/20/2024    COLONOSCOPY POLYPECTOMY SNARE/COLD BIOPSY performed by Christos Morgan Jr., DO at [facility] ENDOSCOPY    COSMETIC SURGERY  2014    face lift    FOOT SURGERY      SCREWS PLACED IN RIGHT FOOT    HERNIA REPAIR Left 12/20/2019    LEFT INGUINAL HERNIA REPAIR WITH MESH and ON Q PUMP performed by Seth Rainey MD at [facility] OR    SEPTOPLASTY N/A 10/11/2022    SEPTOPLASTY, INFERIOR TURBINATE REDUCTION performed by Mathew Byrnes MD at [facility] OR    SHOULDER ARTHROSCOPY Right 08/04/2017    TUBAL LIGATION  1985       Social History     Socioeconomic History    Marital status:      Spouse name: Not on file    Number of children: Not on file    Years of education: Not on file    Highest education level: Not on file   Occupational History     Employer: RETIRED   Tobacco Use    Smoking status: Former

## 2025-02-12 ENCOUNTER — OFFICE VISIT (OUTPATIENT)
Dept: ORTHOPEDIC SURGERY | Age: 70
End: 2025-02-12
Payer: MEDICARE

## 2025-02-12 VITALS — WEIGHT: 126 LBS | HEIGHT: 64 IN | RESPIRATION RATE: 16 BRPM | BODY MASS INDEX: 21.51 KG/M2

## 2025-02-12 DIAGNOSIS — M17.12 PRIMARY OSTEOARTHRITIS OF LEFT KNEE: Primary | ICD-10-CM

## 2025-02-12 PROCEDURE — 1159F MED LIST DOCD IN RCRD: CPT | Performed by: ORTHOPAEDIC SURGERY

## 2025-02-12 PROCEDURE — 1036F TOBACCO NON-USER: CPT | Performed by: ORTHOPAEDIC SURGERY

## 2025-02-12 PROCEDURE — G8427 DOCREV CUR MEDS BY ELIG CLIN: HCPCS | Performed by: ORTHOPAEDIC SURGERY

## 2025-02-12 PROCEDURE — 20610 DRAIN/INJ JOINT/BURSA W/O US: CPT | Performed by: ORTHOPAEDIC SURGERY

## 2025-02-12 PROCEDURE — 3017F COLORECTAL CA SCREEN DOC REV: CPT | Performed by: ORTHOPAEDIC SURGERY

## 2025-02-12 PROCEDURE — 99213 OFFICE O/P EST LOW 20 MIN: CPT | Performed by: ORTHOPAEDIC SURGERY

## 2025-02-12 PROCEDURE — 1124F ACP DISCUSS-NO DSCNMKR DOCD: CPT | Performed by: ORTHOPAEDIC SURGERY

## 2025-02-12 PROCEDURE — G8420 CALC BMI NORM PARAMETERS: HCPCS | Performed by: ORTHOPAEDIC SURGERY

## 2025-02-12 PROCEDURE — G8399 PT W/DXA RESULTS DOCUMENT: HCPCS | Performed by: ORTHOPAEDIC SURGERY

## 2025-02-12 PROCEDURE — 1090F PRES/ABSN URINE INCON ASSESS: CPT | Performed by: ORTHOPAEDIC SURGERY

## 2025-02-12 PROCEDURE — 1125F AMNT PAIN NOTED PAIN PRSNT: CPT | Performed by: ORTHOPAEDIC SURGERY

## 2025-02-12 NOTE — PROGRESS NOTES
and no limp.  Examination of both knees showing full ROM, left mild crepitus, tenderness on medial joint line, stable to varus and valgus stress.  She has intact sensation and good pedal pulses. She has good strength in 2 planes, and has mild tenderness on deep palpation over the medial joint line. Knee reflex 1+ bilaterally.     IMAGING:  Xray 3 views of the left knee was obtained 9/20/2024 in the office and reviewed.  These demonstrate moderate degenerative changes with narrowing of the joint space in the medial joint space compartment, subchondral sclerosis, and marginal osteophytosis.        IMPRESSION: Left knee DJD.    PLAN: I discussed with the patient the treatment options including both surgical and non-surgical treatment.  We recommended Quad exercises and stretching of the calf and hamstrings which was taught to the patient today.  She will take NSAIDS as needed. I believe she may benefit from Orthovisc injection left knee, and she would like to proceed.     PROCEDURE:    With the patient's permission, and under sterile condition, the left knee was prepared and draped with alcohol and injected with #2 Orthovisc through the medial parapatellar port area.  The patient tolerated the procedure well.   A band-aid was applied and the patient was advised to ice the knee for 15-20 minutes to relieve any injection site related pain.    She reported a good improvement immediatly after the injection.     F/U in one week for #3 injection, PT if indicated.      Day Holden MD

## 2025-02-19 ENCOUNTER — OFFICE VISIT (OUTPATIENT)
Dept: ORTHOPEDIC SURGERY | Age: 70
End: 2025-02-19
Payer: MEDICARE

## 2025-02-19 VITALS — HEIGHT: 64 IN | WEIGHT: 126 LBS | RESPIRATION RATE: 14 BRPM | BODY MASS INDEX: 21.51 KG/M2

## 2025-02-19 DIAGNOSIS — M17.12 PRIMARY OSTEOARTHRITIS OF LEFT KNEE: Primary | ICD-10-CM

## 2025-02-19 PROCEDURE — 1036F TOBACCO NON-USER: CPT | Performed by: ORTHOPAEDIC SURGERY

## 2025-02-19 PROCEDURE — 1159F MED LIST DOCD IN RCRD: CPT | Performed by: ORTHOPAEDIC SURGERY

## 2025-02-19 PROCEDURE — G8420 CALC BMI NORM PARAMETERS: HCPCS | Performed by: ORTHOPAEDIC SURGERY

## 2025-02-19 PROCEDURE — 20610 DRAIN/INJ JOINT/BURSA W/O US: CPT | Performed by: ORTHOPAEDIC SURGERY

## 2025-02-19 PROCEDURE — 3017F COLORECTAL CA SCREEN DOC REV: CPT | Performed by: ORTHOPAEDIC SURGERY

## 2025-02-19 PROCEDURE — 99213 OFFICE O/P EST LOW 20 MIN: CPT | Performed by: ORTHOPAEDIC SURGERY

## 2025-02-19 PROCEDURE — G8427 DOCREV CUR MEDS BY ELIG CLIN: HCPCS | Performed by: ORTHOPAEDIC SURGERY

## 2025-02-19 PROCEDURE — 1124F ACP DISCUSS-NO DSCNMKR DOCD: CPT | Performed by: ORTHOPAEDIC SURGERY

## 2025-02-19 PROCEDURE — 1126F AMNT PAIN NOTED NONE PRSNT: CPT | Performed by: ORTHOPAEDIC SURGERY

## 2025-02-19 PROCEDURE — 1090F PRES/ABSN URINE INCON ASSESS: CPT | Performed by: ORTHOPAEDIC SURGERY

## 2025-02-19 PROCEDURE — G8399 PT W/DXA RESULTS DOCUMENT: HCPCS | Performed by: ORTHOPAEDIC SURGERY

## 2025-02-19 NOTE — PROGRESS NOTES
CHIEF COMPLAINT: Left knee pain/ osteoarthritis.    HISTORY:  Ms. Baltazar 69 y.o.  female presents today for f/u. She had left knee cortisone injection on 9/20/2024 with good improvement for few weeks. She was seen on 9/20/2024 as a consultation request from Anita Thomas APRN - NP for evaluation of left knee pain which started to worsen over years.  She is complaining of minimal to no achy pain, 0/10.  Pain is increase with standing and walking and decrease with rest. Pain is achy early in the morning with first few steps, dull achy pain by the end of the day. Alleviating factors: rest. No radiation and no numbness and tingling sensation. No other complaint.  No h/o trauma or gout.    Past Medical History:   Diagnosis Date    Acid reflux     Anxiety     Depression     Deviated septum 10/2022    \"severe on right side\"    Hyperlipidemia     controlled with meds    Ulcer, esophagus 09/2022    healed with meds       Past Surgical History:   Procedure Laterality Date    BREAST LUMPECTOMY      RIGHT BREAST X 3 (BENIGN)    BUNIONECTOMY Left 2008    BUNIONECTOMY Right     COLONOSCOPY      COLONOSCOPY N/A 3/20/2024    COLONOSCOPY POLYPECTOMY SNARE/COLD BIOPSY performed by Christos Morgan Jr., DO at [facility] ENDOSCOPY    COSMETIC SURGERY  2014    face lift    FOOT SURGERY      SCREWS PLACED IN RIGHT FOOT    HERNIA REPAIR Left 12/20/2019    LEFT INGUINAL HERNIA REPAIR WITH MESH and ON Q PUMP performed by Seth Rainey MD at [facility] OR    SEPTOPLASTY N/A 10/11/2022    SEPTOPLASTY, INFERIOR TURBINATE REDUCTION performed by Mathew Byrnes MD at [facility] OR    SHOULDER ARTHROSCOPY Right 08/04/2017    TUBAL LIGATION  1985       Social History     Socioeconomic History    Marital status:      Spouse name: Not on file    Number of children: Not on file    Years of education: Not on file    Highest education level: Not on file   Occupational History     Employer: RETIRED   Tobacco Use    Smoking status:

## 2025-04-25 ENCOUNTER — OFFICE VISIT (OUTPATIENT)
Dept: FAMILY MEDICINE CLINIC | Age: 70
End: 2025-04-25
Payer: MEDICARE

## 2025-04-25 VITALS
SYSTOLIC BLOOD PRESSURE: 120 MMHG | BODY MASS INDEX: 21.03 KG/M2 | WEIGHT: 123.2 LBS | HEIGHT: 64 IN | TEMPERATURE: 97.7 F | HEART RATE: 53 BPM | DIASTOLIC BLOOD PRESSURE: 70 MMHG | OXYGEN SATURATION: 97 % | RESPIRATION RATE: 18 BRPM

## 2025-04-25 DIAGNOSIS — Z00.00 MEDICARE ANNUAL WELLNESS VISIT, SUBSEQUENT: Primary | ICD-10-CM

## 2025-04-25 DIAGNOSIS — E78.00 HYPERCHOLESTEROLEMIA: ICD-10-CM

## 2025-04-25 DIAGNOSIS — F41.9 ANXIETY: ICD-10-CM

## 2025-04-25 DIAGNOSIS — K21.9 GASTROESOPHAGEAL REFLUX DISEASE, UNSPECIFIED WHETHER ESOPHAGITIS PRESENT: ICD-10-CM

## 2025-04-25 PROCEDURE — 1159F MED LIST DOCD IN RCRD: CPT

## 2025-04-25 PROCEDURE — G8399 PT W/DXA RESULTS DOCUMENT: HCPCS

## 2025-04-25 PROCEDURE — G0439 PPPS, SUBSEQ VISIT: HCPCS

## 2025-04-25 PROCEDURE — 99214 OFFICE O/P EST MOD 30 MIN: CPT

## 2025-04-25 PROCEDURE — 1036F TOBACCO NON-USER: CPT

## 2025-04-25 PROCEDURE — 3017F COLORECTAL CA SCREEN DOC REV: CPT

## 2025-04-25 PROCEDURE — G8427 DOCREV CUR MEDS BY ELIG CLIN: HCPCS

## 2025-04-25 PROCEDURE — G8420 CALC BMI NORM PARAMETERS: HCPCS

## 2025-04-25 PROCEDURE — 1090F PRES/ABSN URINE INCON ASSESS: CPT

## 2025-04-25 PROCEDURE — 1124F ACP DISCUSS-NO DSCNMKR DOCD: CPT

## 2025-04-25 RX ORDER — FLUTICASONE PROPIONATE 50 MCG
1 SPRAY, SUSPENSION (ML) NASAL DAILY
Qty: 16 G | Refills: 2 | Status: SHIPPED | OUTPATIENT
Start: 2025-04-25

## 2025-04-25 SDOH — ECONOMIC STABILITY: FOOD INSECURITY: WITHIN THE PAST 12 MONTHS, THE FOOD YOU BOUGHT JUST DIDN'T LAST AND YOU DIDN'T HAVE MONEY TO GET MORE.: NEVER TRUE

## 2025-04-25 SDOH — ECONOMIC STABILITY: FOOD INSECURITY: WITHIN THE PAST 12 MONTHS, YOU WORRIED THAT YOUR FOOD WOULD RUN OUT BEFORE YOU GOT MONEY TO BUY MORE.: NEVER TRUE

## 2025-04-25 ASSESSMENT — LIFESTYLE VARIABLES
HOW OFTEN DURING THE LAST YEAR HAVE YOU FAILED TO DO WHAT WAS NORMALLY EXPECTED FROM YOU BECAUSE OF DRINKING: NEVER
HOW OFTEN DURING THE LAST YEAR HAVE YOU BEEN UNABLE TO REMEMBER WHAT HAPPENED THE NIGHT BEFORE BECAUSE YOU HAD BEEN DRINKING: NEVER
HOW OFTEN DO YOU HAVE A DRINK CONTAINING ALCOHOL: MONTHLY OR LESS
HOW MANY STANDARD DRINKS CONTAINING ALCOHOL DO YOU HAVE ON A TYPICAL DAY: 1 OR 2
HOW OFTEN DURING THE LAST YEAR HAVE YOU HAD A FEELING OF GUILT OR REMORSE AFTER DRINKING: NEVER
HAVE YOU OR SOMEONE ELSE BEEN INJURED AS A RESULT OF YOUR DRINKING: NO
HAS A RELATIVE, FRIEND, DOCTOR, OR ANOTHER HEALTH PROFESSIONAL EXPRESSED CONCERN ABOUT YOUR DRINKING OR SUGGESTED YOU CUT DOWN: NO
HOW OFTEN DURING THE LAST YEAR HAVE YOU FOUND THAT YOU WERE NOT ABLE TO STOP DRINKING ONCE YOU HAD STARTED: NEVER
HOW OFTEN DURING THE LAST YEAR HAVE YOU NEEDED AN ALCOHOLIC DRINK FIRST THING IN THE MORNING TO GET YOURSELF GOING AFTER A NIGHT OF HEAVY DRINKING: NEVER

## 2025-04-25 ASSESSMENT — PATIENT HEALTH QUESTIONNAIRE - PHQ9
2. FEELING DOWN, DEPRESSED OR HOPELESS: NOT AT ALL
10. IF YOU CHECKED OFF ANY PROBLEMS, HOW DIFFICULT HAVE THESE PROBLEMS MADE IT FOR YOU TO DO YOUR WORK, TAKE CARE OF THINGS AT HOME, OR GET ALONG WITH OTHER PEOPLE: NOT DIFFICULT AT ALL
SUM OF ALL RESPONSES TO PHQ QUESTIONS 1-9: 0
6. FEELING BAD ABOUT YOURSELF - OR THAT YOU ARE A FAILURE OR HAVE LET YOURSELF OR YOUR FAMILY DOWN: NOT AT ALL
7. TROUBLE CONCENTRATING ON THINGS, SUCH AS READING THE NEWSPAPER OR WATCHING TELEVISION: NOT AT ALL
4. FEELING TIRED OR HAVING LITTLE ENERGY: NOT AT ALL
SUM OF ALL RESPONSES TO PHQ QUESTIONS 1-9: 0
9. THOUGHTS THAT YOU WOULD BE BETTER OFF DEAD, OR OF HURTING YOURSELF: NOT AT ALL
3. TROUBLE FALLING OR STAYING ASLEEP: NOT AT ALL
8. MOVING OR SPEAKING SO SLOWLY THAT OTHER PEOPLE COULD HAVE NOTICED. OR THE OPPOSITE, BEING SO FIGETY OR RESTLESS THAT YOU HAVE BEEN MOVING AROUND A LOT MORE THAN USUAL: NOT AT ALL
1. LITTLE INTEREST OR PLEASURE IN DOING THINGS: NOT AT ALL
5. POOR APPETITE OR OVEREATING: NOT AT ALL
SUM OF ALL RESPONSES TO PHQ QUESTIONS 1-9: 0
SUM OF ALL RESPONSES TO PHQ QUESTIONS 1-9: 0

## 2025-04-25 ASSESSMENT — ENCOUNTER SYMPTOMS
ABDOMINAL PAIN: 0
CONSTIPATION: 0
COUGH: 0
APNEA: 0
SHORTNESS OF BREATH: 0
VOMITING: 0
SORE THROAT: 0
WHEEZING: 0
COLOR CHANGE: 0
BACK PAIN: 0
DIARRHEA: 0
SINUS PRESSURE: 0
NAUSEA: 0
TROUBLE SWALLOWING: 0
BLOOD IN STOOL: 0

## 2025-04-25 NOTE — PATIENT INSTRUCTIONS

## 2025-04-25 NOTE — PROGRESS NOTES
Ronna Baltazar (:  1955) is a 69 y.o. female,Established patient, here for evaluation of the following chief complaint(s):  Medicare AWV (Medicare AWV)      ASSESSMENT/PLAN:  1. Medicare annual wellness visit, subsequent  Assessment & Plan:    Well exam in the office today, reviewed history, vitals, medicine  Due for fasting labs  Up-to-date colonoscopy and mammogram  Continue fall precautions  Continue good water intake  Follow-up in 1 year  2. Gastroesophageal reflux disease, unspecified whether esophagitis present  Assessment & Plan:   Chronic, at goal (stable), continue current treatment plan  Orders:  -     CBC with Auto Differential; Future  3. Hypercholesterolemia  Assessment & Plan:   Chronic, at goal (stable), continue current treatment plan and lifestyle modifications recommended, labs ordered today  Orders:  -     Comprehensive Metabolic Panel; Future  -     Lipid Panel; Future  4. Anxiety  Assessment & Plan:   Chronic, at goal (stable), doing well on her Celexa at current dose.      Return in about 1 year (around 2026) for yearly.    SUBJECTIVE/OBJECTIVE:    Rnona presents today for annual wellness visit as well as routine follow-up for chronic disease management.  No acute changes in medical history.  She is following with orthopedics for her knees.    Blood pressure stable in office 120/70.  Denies shortness of breath, chest pain, leg swelling, dizziness.  Non-smoker, she does smoke marijuana nightly  Monitoring diet.  Staying well-hydrated    Bowels regular, up-to-date colonoscopy.  Date of last Colonoscopy: 3/20/2024   No cologuard on file  No FIT/FOBT on file   No flexible sigmoidoscopy on file     Date of last Mammogram: 2024     Mood stable, doing well on Celexa    Up-to-date eye exam  Up-to-date dental exam    Up-to-date vaccines      Past Medical History:   Diagnosis Date    Acid reflux     Anxiety     Depression     Deviated septum 10/2022    \"severe on right side\"

## 2025-04-25 NOTE — ASSESSMENT & PLAN NOTE
Well exam in the office today, reviewed history, vitals, medicine  Due for fasting labs  Up-to-date colonoscopy and mammogram  Continue fall precautions  Continue good water intake  Follow-up in 1 year

## 2025-04-25 NOTE — ASSESSMENT & PLAN NOTE
Chronic, at goal (stable), continue current treatment plan and lifestyle modifications recommended, labs ordered today

## 2025-04-30 DIAGNOSIS — E78.00 HYPERCHOLESTEROLEMIA: ICD-10-CM

## 2025-04-30 DIAGNOSIS — K21.9 GASTROESOPHAGEAL REFLUX DISEASE, UNSPECIFIED WHETHER ESOPHAGITIS PRESENT: ICD-10-CM

## 2025-04-30 LAB
ALBUMIN SERPL-MCNC: 4.5 G/DL (ref 3.4–5)
ALBUMIN/GLOB SERPL: 2 {RATIO} (ref 1.1–2.2)
ALP SERPL-CCNC: 88 U/L (ref 40–129)
ALT SERPL-CCNC: 21 U/L (ref 10–40)
ANION GAP SERPL CALCULATED.3IONS-SCNC: 10 MMOL/L (ref 3–16)
AST SERPL-CCNC: 26 U/L (ref 15–37)
BASOPHILS # BLD: 0 K/UL (ref 0–0.2)
BASOPHILS NFR BLD: 0.5 %
BILIRUB SERPL-MCNC: 0.8 MG/DL (ref 0–1)
BUN SERPL-MCNC: 8 MG/DL (ref 7–20)
CALCIUM SERPL-MCNC: 10 MG/DL (ref 8.3–10.6)
CHLORIDE SERPL-SCNC: 102 MMOL/L (ref 99–110)
CHOLEST SERPL-MCNC: 135 MG/DL (ref 0–199)
CO2 SERPL-SCNC: 28 MMOL/L (ref 21–32)
CREAT SERPL-MCNC: 0.6 MG/DL (ref 0.6–1.2)
DEPRECATED RDW RBC AUTO: 13.3 % (ref 12.4–15.4)
EOSINOPHIL # BLD: 0.1 K/UL (ref 0–0.6)
EOSINOPHIL NFR BLD: 1.8 %
GFR SERPLBLD CREATININE-BSD FMLA CKD-EPI: >90 ML/MIN/{1.73_M2}
GLUCOSE SERPL-MCNC: 101 MG/DL (ref 70–99)
HCT VFR BLD AUTO: 40.8 % (ref 36–48)
HDLC SERPL-MCNC: 66 MG/DL (ref 40–60)
HGB BLD-MCNC: 14 G/DL (ref 12–16)
LDLC SERPL CALC-MCNC: 49 MG/DL
LYMPHOCYTES # BLD: 2.6 K/UL (ref 1–5.1)
LYMPHOCYTES NFR BLD: 42.5 %
MCH RBC QN AUTO: 32.3 PG (ref 26–34)
MCHC RBC AUTO-ENTMCNC: 34.4 G/DL (ref 31–36)
MCV RBC AUTO: 93.9 FL (ref 80–100)
MONOCYTES # BLD: 0.5 K/UL (ref 0–1.3)
MONOCYTES NFR BLD: 8.1 %
NEUTROPHILS # BLD: 2.9 K/UL (ref 1.7–7.7)
NEUTROPHILS NFR BLD: 47.1 %
PLATELET # BLD AUTO: 254 K/UL (ref 135–450)
PMV BLD AUTO: 9.1 FL (ref 5–10.5)
POTASSIUM SERPL-SCNC: 4.1 MMOL/L (ref 3.5–5.1)
PROT SERPL-MCNC: 6.8 G/DL (ref 6.4–8.2)
RBC # BLD AUTO: 4.35 M/UL (ref 4–5.2)
SODIUM SERPL-SCNC: 140 MMOL/L (ref 136–145)
TRIGL SERPL-MCNC: 99 MG/DL (ref 0–150)
VLDLC SERPL CALC-MCNC: 20 MG/DL
WBC # BLD AUTO: 6.1 K/UL (ref 4–11)

## 2025-05-02 ENCOUNTER — RESULTS FOLLOW-UP (OUTPATIENT)
Dept: FAMILY MEDICINE CLINIC | Age: 70
End: 2025-05-02

## 2025-05-25 PROBLEM — Z00.00 MEDICARE ANNUAL WELLNESS VISIT, SUBSEQUENT: Status: RESOLVED | Noted: 2025-04-25 | Resolved: 2025-05-25

## (undated) DEVICE — SUTURE VCRL SZ 4-0 L18IN ABSRB UD L19MM PS-2 3/8 CIR PRIM J496H

## (undated) DEVICE — CHLORAPREP 26ML ORANGE

## (undated) DEVICE — MERCY HEALTH WEST TURNOVER: Brand: MEDLINE INDUSTRIES, INC.

## (undated) DEVICE — KIT,ANTI FOG,W/SPONGE & FLUID,SOFT PACK: Brand: MEDLINE

## (undated) DEVICE — TUBING, SUCTION, 1/4" X 12', STRAIGHT: Brand: MEDLINE

## (undated) DEVICE — SUTURE ABSORBABLE BRAIDED 2-0 CT-1 27 IN UD VICRYL J259H

## (undated) DEVICE — ENT I-LF: Brand: MEDLINE INDUSTRIES, INC.

## (undated) DEVICE — SUTURE VCRL SZ 3-0 L27IN ABSRB UD L26MM SH 1/2 CIR J416H

## (undated) DEVICE — SPONGE,NEURO,1"X3",XR,STRL,LF,10/PK: Brand: MEDLINE

## (undated) DEVICE — SOLUTION,SALINE,IRRGATION,500ML,STRL: Brand: MEDLINE

## (undated) DEVICE — COVER LT HNDL BLU PLAS

## (undated) DEVICE — SUTURE PDS II SZ 2-0 L27IN ABSRB VLT L26MM CT-2 1/2 CIR Z333H

## (undated) DEVICE — CLEANER,CAUTERY TIP,2X2",STERILE: Brand: MEDLINE

## (undated) DEVICE — SPONGE GZ W4XL4IN COT 12 PLY TYP VII WVN C FLD DSGN

## (undated) DEVICE — SYRINGE MED 30ML STD CLR PLAS LUERLOCK TIP N CTRL DISP

## (undated) DEVICE — GLOVE ORANGE PI 7 1/2   MSG9075

## (undated) DEVICE — SUTURE ABSORBABLE MONOFILAMENT 4-0 SC-1 18 IN PLN GUT 1824H

## (undated) DEVICE — BLADE SURG SHT MOD STBL ISCHIAL SCR

## (undated) DEVICE — SOLUTION IV IRRIG POUR BRL 0.9% SODIUM CHL 2F7124

## (undated) DEVICE — SUTURE 4-0 L18IN ABSRB BRN PS-4 L16MM 1/2 CIR PRIM REV CUT 1643G

## (undated) DEVICE — TOWEL,OR,DSP,ST,BLUE,STD,4/PK,20PK/CS: Brand: MEDLINE

## (undated) DEVICE — INTENDED FOR TISSUE SEPARATION, AND OTHER PROCEDURES THAT REQUIRE A SHARP SURGICAL BLADE TO PUNCTURE OR CUT.: Brand: BARD-PARKER ® STAINLESS STEEL BLADES

## (undated) DEVICE — SYRINGE MED 10ML LUERLOCK TIP W/O SFTY DISP

## (undated) DEVICE — SNARES COLD OVAL 10MM THIN

## (undated) DEVICE — TURBINATOR WAND: Brand: COBLATION

## (undated) DEVICE — GOWN SIRUS NONREIN XL W/TWL: Brand: MEDLINE INDUSTRIES, INC.

## (undated) DEVICE — GLOVE ORANGE PI 7   MSG9070

## (undated) DEVICE — PENROSE DRAIN 18 X .5" SILICONE: Brand: MEDLINE

## (undated) DEVICE — 3M™ STERI-STRIP™ COMPOUND BENZOIN TINCTURE 40 BAGS/CARTON 4 CARTONS/CASE C1544: Brand: 3M™ STERI-STRIP™

## (undated) DEVICE — SUTURE PROL SZ 3-0 L18IN NONABSORBABLE BLU L30MM FS-1 3/8 8663G

## (undated) DEVICE — 3M™ STERI-DRAPE™ INSTRUMENT POUCH 1018: Brand: STERI-DRAPE™

## (undated) DEVICE — 3M™ TEGADERM™ TRANSPARENT FILM DRESSING FRAME STYLE, 1626W, 4 IN X 4-3/4 IN (10 CM X 12 CM), 50/CT 4CT/CASE: Brand: 3M™ TEGADERM™

## (undated) DEVICE — KIT INFUS PMP 270ML 2M/HR SOAK CATH L2.5IN N NARC ON-Q

## (undated) DEVICE — SUTURE ABSORBABLE MONOFILAMENT 5-0 P3 18 IN UD PDS + PDP493G

## (undated) DEVICE — ELECTROSURGICAL PENCIL BUTTON SWITCH NON COATED BLADE ELECTRODE 10 FT (3 M) CORD HOLSTER: Brand: MEGADYNE

## (undated) DEVICE — MINOR SET UP PK

## (undated) DEVICE — GARMENT COMPR STD FOR 17IN CALF UNIF THER FLOTRN

## (undated) DEVICE — ELECTRODE PT RET AD L9FT HI MOIST COND ADH HYDRGEL CORDED

## (undated) DEVICE — AGENT HEMSTAT W2XL3IN OXIDIZED REGENERATED CELOS ABSRB

## (undated) DEVICE — STRIP,CLOSURE,WOUND,MEDI-STRIP,1/2X4: Brand: MEDLINE